# Patient Record
Sex: MALE | Race: WHITE | NOT HISPANIC OR LATINO | Employment: FULL TIME | URBAN - METROPOLITAN AREA
[De-identification: names, ages, dates, MRNs, and addresses within clinical notes are randomized per-mention and may not be internally consistent; named-entity substitution may affect disease eponyms.]

---

## 2017-02-28 ENCOUNTER — TELEPHONE (OUTPATIENT)
Dept: OTHER | Facility: CLINIC | Age: 47
End: 2017-02-28

## 2017-05-18 ENCOUNTER — OFFICE VISIT (OUTPATIENT)
Dept: FAMILY MEDICINE | Facility: CLINIC | Age: 47
End: 2017-05-18
Payer: COMMERCIAL

## 2017-05-18 VITALS
HEIGHT: 69 IN | SYSTOLIC BLOOD PRESSURE: 143 MMHG | WEIGHT: 215 LBS | BODY MASS INDEX: 31.84 KG/M2 | DIASTOLIC BLOOD PRESSURE: 95 MMHG | HEART RATE: 73 BPM | TEMPERATURE: 96.9 F

## 2017-05-18 DIAGNOSIS — N45.1 EPIDIDYMITIS, RIGHT: Primary | ICD-10-CM

## 2017-05-18 LAB
ALBUMIN UR-MCNC: NEGATIVE MG/DL
APPEARANCE UR: CLEAR
BILIRUB UR QL STRIP: NEGATIVE
COLOR UR AUTO: YELLOW
GLUCOSE UR STRIP-MCNC: NEGATIVE MG/DL
HGB UR QL STRIP: NEGATIVE
KETONES UR STRIP-MCNC: NEGATIVE MG/DL
LEUKOCYTE ESTERASE UR QL STRIP: NEGATIVE
NITRATE UR QL: NEGATIVE
PH UR STRIP: 5.5 PH (ref 5–7)
RBC #/AREA URNS AUTO: NORMAL /HPF (ref 0–2)
SP GR UR STRIP: 1.01 (ref 1–1.03)
URN SPEC COLLECT METH UR: NORMAL
UROBILINOGEN UR STRIP-ACNC: 0.2 EU/DL (ref 0.2–1)
WBC #/AREA URNS AUTO: NORMAL /HPF (ref 0–2)

## 2017-05-18 PROCEDURE — 81001 URINALYSIS AUTO W/SCOPE: CPT | Performed by: FAMILY MEDICINE

## 2017-05-18 PROCEDURE — 99213 OFFICE O/P EST LOW 20 MIN: CPT | Performed by: FAMILY MEDICINE

## 2017-05-18 RX ORDER — LEVOFLOXACIN 500 MG/1
500 TABLET, FILM COATED ORAL DAILY
Qty: 10 TABLET | Refills: 0 | Status: SHIPPED | OUTPATIENT
Start: 2017-05-18 | End: 2017-05-28

## 2017-05-18 NOTE — MR AVS SNAPSHOT
After Visit Summary   5/18/2017    Oral Blount    MRN: [id number]           Patient Information     Date Of Birth          1970        Visit Information        Provider Department      5/18/2017 9:40 AM Jonathan Koenig MD [facility]        Today's Diagnoses     Epididymitis, right    -  1      Care Instructions    You will be contacted in 1-2 days for results of your lab tests.      Discharge Instructions for Epididymitis  You have been diagnosed with epididymitis. This is an inflammation of a coiled tube called the epididymis that is located behind your testicle, inside the scrotum. The epididymis collects and stores sperm made by the testicles. Epididymitis is often caused by bacteria in the urinary tract or by bacteria passed between partners during sex. It can also be a complication of certain hospital procedures, or it can be caused by use of a urinary catheter. Here s what you need to do at home to care for yourself.  Home care    Be sure to finish all the medication your health care provider prescribed -- even if you feel better. Not finishing the medication can make your condition harder to treat or cause the condition to come back.    Rest in bed if you are uncomfortable. Discomfort should go away within 1 to 3 days of treatment. Swelling may persist for up to 2 months.    Ask your health care provider about pain medication to keep you comfortable.    Use an ice pack or bag of frozen peas to help relieve the pain. Wrap the peas or ice pack in a thin cloth and apply to the area.    Don t leave the ice pack on your skin for longer than 20 minutes, and don t use it more often than once every hour.    Elevate the scrotum with a rolled-up towel when you are resting.    For the first few days, wear an athletic supporter. When your pain subsides, wear briefs instead of boxers to better support the scrotum. This can help relieve pain.    Keep your penis and scrotum  "clean.    Use a condom to protect against sexually transmitted diseases (STDs).    If your condition was caused by an STD, be sure to inform your sexual partner(s).  Follow-up care  Make a follow-up appointment as directed by your health care provider.  When to seek medical care  Call your health care provider right away if you have any of the following:    Increased pain or swelling in the scrotum    Frequent urge or inability to urinate    Discharge from the penis    Pain during ejaculation    Fever above 100.4 F (38 C)     2163-4366 [employer]. [address] [place] [address]. All rights reserved. This information is not intended as a substitute for professional medical care. Always follow your healthcare professional's instructions.              Follow-ups after your visit        Who to contact     If you have questions or need follow up information about today's clinic visit or your schedule please contact [facility] directly at [phone].  Normal or non-critical lab and imaging results will be communicated to you by MyChart, letter or phone within 4 business days after the clinic has received the results. If you do not hear from us within 7 days, please contact the clinic through "[employer]hart or phone. If you have a critical or abnormal lab result, we will notify you by phone as soon as possible.  Submit refill requests through [employer] or call your pharmacy and they will forward the refill request to us. Please allow 3 business days for your refill to be completed.          Additional Information About Your Visit        "[employer] Information     [employer] lets you send messages to your doctor, view your test results, renew your prescriptions, schedule appointments and more. To sign up, go to [email or web address] . Click on \"Log in\" on the left side of the screen, which will take you to the Welcome page. Then click on \"Sign up Now\" on the right side of the page.     You will " "be asked to enter the access code listed below, as well as some personal information. Please follow the directions to create your username and password.     Your access code is: [email or web address]  Expires: 2017 10:21 AM     Your access code will  in 90 days. If you need help or a new code, please call your [facility] or [phone].        Care EveryWhere ID     This is your Care EveryWhere ID. This could be used by other organizations to access your [facility] medical records  [id number]        Your Vitals Were     Pulse Temperature Height BMI (Body Mass Index)          73 96.9  F (36.1  C) (Tympanic) 5' 9.25\" (1.759 m) 31.52 kg/m2         Blood Pressure from Last 3 Encounters:   17 (!) 143/95   16 (!) 128/92   16 118/79    Weight from Last 3 Encounters:   17 215 lb (97.5 kg)   04/08/15 205 lb (93 kg)   14 205 lb (93 kg)              We Performed the Following     UA with Microscopic reflex to Culture          Today's Medication Changes          These changes are accurate as of: 17 10:21 AM.  If you have any questions, ask your nurse or doctor.               Start taking these medicines.        Dose/Directions    levofloxacin 500 MG tablet   Commonly known as:  LEVAQUIN   Used for:  Epididymitis, right   Started by:  Jonathan Koenig MD        Dose:  500 mg   Take 1 tablet (500 mg) by mouth daily for 10 days   Quantity:  10 tablet   Refills:  0            Where to get your medicines      These medications were sent to [facility] - [place], [place] - [address]  [address] [facility] [address]     Phone:  [phone]     levofloxacin 500 MG tablet                Primary Care Provider Office Phone # Fax #    Elliot Osborne [phone] [address] [facility] [address]        Thank you!     Thank you for choosing [facility]  for your care. Our goal is always to provide you with excellent " care. Hearing back from our patients is one way we can continue to improve our services. Please take a few minutes to complete the written survey that you may receive in the mail after your visit with us. Thank you!             Your Updated Medication List - Protect others around you: Learn how to safely use, store and throw away your medicines at www.disposemymeds.org.          This list is accurate as of: 5/18/17 10:21 AM.  Always use your most recent med list.                   Brand Name Dispense Instructions for use    levofloxacin 500 MG tablet    LEVAQUIN    10 tablet    Take 1 tablet (500 mg) by mouth daily for 10 days

## 2017-05-18 NOTE — PROGRESS NOTES
SUBJECTIVE:                                                    Oral Blount is a 46 year old male who presents to clinic today for the following health issues:      Right testicular pain      Duration: 1 week    Description (location/character/radiation): Right testicle pain started with soreness without any particular trigger       Associated flank pain: None    Intensity:  Mild; denies limiting any activity.    Accompanying signs and symptoms:        Fever/Chills: no        Gas/Bloating: no        Nausea/vomitting: no        Diarrhea: no        Dysuria or Hematuria: no   Patient denies abdominal or scrotal lump.    History (previous similar pain/trauma/previous testing): NONE    Precipitating or alleviating factors:       Pain worse with eating/BM/urination: None       Pain relieved by BM: no   Patient states when he pushed a trailer few days ago, he noticed slight increase in pressure/soreness on the right testicular area.    Therapies tried and outcome: None    LMP:  not applicable   Patient tried not wearing underwear and he felt it relived some of the discomfort compared to wearing briefs.  Patient has been doing some minor to moderate construction and lifting around the house/yard in last few weeks.  Problem list and histories reviewed & adjusted, as indicated.  Patient reports sexual activity, monogomous and denies pain on intercourse or ejaculation.    Additional history: as documented    Patient Active Problem List   Diagnosis     Allergic rhinitis     Left great toe MTP pain     Left JOINT PAIN-SHLDER     LUQ abdominal pain     Gout     Past Surgical History:   Procedure Laterality Date     SURGICAL HISTORY OF -   2003    vasectomy       Social History   Substance Use Topics     Smoking status: Never Smoker     Smokeless tobacco: Never Used     Alcohol use No     Family History   Problem Relation Age of Onset     Eye Disorder Mother      degenerative     Hypertension Father      DIABETES Father       "CANCER Father      BCC     CANCER Paternal Grandmother      lung     CANCER Paternal Grandfather      lung     Melanoma No family hx of          Current Outpatient Prescriptions   Medication Sig Dispense Refill     levofloxacin (LEVAQUIN) 500 MG tablet Take 1 tablet (500 mg) by mouth daily for 10 days 10 tablet 0     No Known Allergies    Reviewed and updated as needed this visit by clinical staff  Tobacco  Allergies  Meds  Problems  Med Hx  Surg Hx  Fam Hx  Soc Hx        Reviewed and updated as needed this visit by Provider  Allergies  Meds  Problems         ROS:  C: NEGATIVE for fever, chills, change in weight  I: NEGATIVE for worrisome rashes, moles or lesions  R: NEGATIVE for significant cough or SOB  CV: NEGATIVE for chest pain, palpitations or peripheral edema  GI: NEGATIVE for nausea, abdominal pain, heartburn, or change in bowel habits   male :see ab[ove  M: NEGATIVE for significant arthralgias or myalgia  H: NEGATIVE for bleeding problems    OBJECTIVE:                                                    BP (!) 143/95 (BP Location: Right arm, Patient Position: Chair, Cuff Size: Adult Regular)  Pulse 73  Temp 96.9  F (36.1  C) (Tympanic)  Ht 5' 9.25\" (1.759 m)  Wt 215 lb (97.5 kg)  BMI 31.52 kg/m2  Body mass index is 31.52 kg/(m^2).  GEN: alert, oriented x 3, NAD  SKIN: no lesions/rash/ulcer/erythema  : circumcised penis; skin lesions as above; no  urethral discharge; no scrotal mass/swelling; no testicular mass either side; no inguinal LAD/mass either side; (+) mild tenderness of the right epidydimal area  Diagnostic test results:  Diagnostic Test Results:  none      ASSESSMENT/PLAN:                                                        ICD-10-CM    1. Epididymitis, right N45.1 UA with Microscopic reflex to Culture     levofloxacin (LEVAQUIN) 500 MG tablet   Discussed possible causes: infection, urethral backflow, autoimmune.  No identified high risk for STIs, so will treat for possible " enteric organism infection.  No suspicious mass that indicates imaging.   will wait for results of urine and treat accordingly when available.  Patient is advised to call clinic in 5-7 days if no resolution yet.      Follow up with Provider - prn   Patient Instructions     You will be contacted in 1-2 days for results of your lab tests.      Discharge Instructions for Epididymitis  You have been diagnosed with epididymitis. This is an inflammation of a coiled tube called the epididymis that is located behind your testicle, inside the scrotum. The epididymis collects and stores sperm made by the testicles. Epididymitis is often caused by bacteria in the urinary tract or by bacteria passed between partners during sex. It can also be a complication of certain hospital procedures, or it can be caused by use of a urinary catheter. Here s what you need to do at home to care for yourself.  Home care    Be sure to finish all the medication your health care provider prescribed -- even if you feel better. Not finishing the medication can make your condition harder to treat or cause the condition to come back.    Rest in bed if you are uncomfortable. Discomfort should go away within 1 to 3 days of treatment. Swelling may persist for up to 2 months.    Ask your health care provider about pain medication to keep you comfortable.    Use an ice pack or bag of frozen peas to help relieve the pain. Wrap the peas or ice pack in a thin cloth and apply to the area.    Don t leave the ice pack on your skin for longer than 20 minutes, and don t use it more often than once every hour.    Elevate the scrotum with a rolled-up towel when you are resting.    For the first few days, wear an athletic supporter. When your pain subsides, wear briefs instead of boxers to better support the scrotum. This can help relieve pain.    Keep your penis and scrotum clean.    Use a condom to protect against sexually transmitted diseases (STDs).    If your  condition was caused by an STD, be sure to inform your sexual partner(s).  Follow-up care  Make a follow-up appointment as directed by your health care provider.  When to seek medical care  Call your health care provider right away if you have any of the following:    Increased pain or swelling in the scrotum    Frequent urge or inability to urinate    Discharge from the penis    Pain during ejaculation    Fever above 100.4 F (38 C)     1864-7340 [employer] [address] [place] [address]. All rights reserved. This information is not intended as a substitute for professional medical care. Always follow your healthcare professional's instructions.            Jonathan Koenig MD  [facility]

## 2017-05-18 NOTE — NURSING NOTE
"Chief Complaint   Patient presents with     Abdominal Pain     Patient is here for evaluation of lower abdomen and testicle pain. This started one week ago.        Initial BP (!) 143/95 (BP Location: Right arm, Patient Position: Chair, Cuff Size: Adult Regular)  Pulse 73  Temp 96.9  F (36.1  C) (Tympanic)  Ht 5' 9.25\" (1.759 m)  Wt 215 lb (97.5 kg)  BMI 31.52 kg/m2 Estimated body mass index is 31.52 kg/(m^2) as calculated from the following:    Height as of this encounter: 5' 9.25\" (1.759 m).    Weight as of this encounter: 215 lb (97.5 kg).  Medication Reconciliation: complete   Lesvia Rai, LINO        "

## 2017-05-18 NOTE — PATIENT INSTRUCTIONS
You will be contacted in 1-2 days for results of your lab tests.      Discharge Instructions for Epididymitis  You have been diagnosed with epididymitis. This is an inflammation of a coiled tube called the epididymis that is located behind your testicle, inside the scrotum. The epididymis collects and stores sperm made by the testicles. Epididymitis is often caused by bacteria in the urinary tract or by bacteria passed between partners during sex. It can also be a complication of certain hospital procedures, or it can be caused by use of a urinary catheter. Here s what you need to do at home to care for yourself.  Home care    Be sure to finish all the medication your health care provider prescribed -- even if you feel better. Not finishing the medication can make your condition harder to treat or cause the condition to come back.    Rest in bed if you are uncomfortable. Discomfort should go away within 1 to 3 days of treatment. Swelling may persist for up to 2 months.    Ask your health care provider about pain medication to keep you comfortable.    Use an ice pack or bag of frozen peas to help relieve the pain. Wrap the peas or ice pack in a thin cloth and apply to the area.    Don t leave the ice pack on your skin for longer than 20 minutes, and don t use it more often than once every hour.    Elevate the scrotum with a rolled-up towel when you are resting.    For the first few days, wear an athletic supporter. When your pain subsides, wear briefs instead of boxers to better support the scrotum. This can help relieve pain.    Keep your penis and scrotum clean.    Use a condom to protect against sexually transmitted diseases (STDs).    If your condition was caused by an STD, be sure to inform your sexual partner(s).  Follow-up care  Make a follow-up appointment as directed by your health care provider.  When to seek medical care  Call your health care provider right away if you have any of the  following:    Increased pain or swelling in the scrotum    Frequent urge or inability to urinate    Discharge from the penis    Pain during ejaculation    Fever above 100.4 F (38 C)     6950-0154 [employer]. [address] [place] [address], [place] [address]. All rights reserved. This information is not intended as a substitute for professional medical care. Always follow your healthcare professional's instructions.

## 2017-06-28 ENCOUNTER — OFFICE VISIT (OUTPATIENT)
Dept: FAMILY MEDICINE | Facility: CLINIC | Age: 47
End: 2017-06-28
Payer: COMMERCIAL

## 2017-06-28 VITALS
SYSTOLIC BLOOD PRESSURE: 131 MMHG | TEMPERATURE: 97.1 F | HEART RATE: 73 BPM | BODY MASS INDEX: 31.49 KG/M2 | HEIGHT: 69 IN | DIASTOLIC BLOOD PRESSURE: 81 MMHG | WEIGHT: 212.6 LBS

## 2017-06-28 DIAGNOSIS — Z12.5 SCREENING FOR PROSTATE CANCER: ICD-10-CM

## 2017-06-28 DIAGNOSIS — Z00.01 ENCOUNTER FOR ROUTINE ADULT MEDICAL EXAM WITH ABNORMAL FINDINGS: Primary | ICD-10-CM

## 2017-06-28 DIAGNOSIS — Z23 NEED FOR TDAP VACCINATION: ICD-10-CM

## 2017-06-28 DIAGNOSIS — Z13.6 CARDIOVASCULAR SCREENING; LDL GOAL LESS THAN 160: ICD-10-CM

## 2017-06-28 DIAGNOSIS — Z11.4 SCREENING FOR HUMAN IMMUNODEFICIENCY VIRUS: ICD-10-CM

## 2017-06-28 DIAGNOSIS — N50.811 RIGHT TESTICULAR PAIN: ICD-10-CM

## 2017-06-28 DIAGNOSIS — Z13.1 SCREENING FOR DIABETES MELLITUS: ICD-10-CM

## 2017-06-28 DIAGNOSIS — Z23 NEED FOR VACCINATION: ICD-10-CM

## 2017-06-28 PROCEDURE — 90471 IMMUNIZATION ADMIN: CPT | Performed by: FAMILY MEDICINE

## 2017-06-28 PROCEDURE — 90715 TDAP VACCINE 7 YRS/> IM: CPT | Performed by: FAMILY MEDICINE

## 2017-06-28 PROCEDURE — 99213 OFFICE O/P EST LOW 20 MIN: CPT | Mod: 25 | Performed by: FAMILY MEDICINE

## 2017-06-28 PROCEDURE — 99396 PREV VISIT EST AGE 40-64: CPT | Mod: 25 | Performed by: FAMILY MEDICINE

## 2017-06-28 NOTE — MR AVS SNAPSHOT
After Visit Summary   6/28/2017    Oral Blount    MRN: [id number]           Patient Information     Date Of Birth          1970        Visit Information        Provider Department      6/28/2017 7:40 AM Jonathan Koenig MD [facility]        Today's Diagnoses     Encounter for routine adult medical exam with abnormal findings    -  1    Right testicular pain        CARDIOVASCULAR SCREENING; LDL GOAL LESS THAN 160        Screening for diabetes mellitus        Screening for prostate cancer        Need for Tdap vaccination        Screening for human immunodeficiency virus          Care Instructions    Call to schedule your lab tests; make sure you are fasting for more than 12 hrs.    Eat 5 cups of vegetables, fruits and whole grains per day.  Limit starchy food (white rice, white bread, white pasta, white potatoes) to less than a cup per meal.  Minimize sweets, junk food and fastfood.  Exercise: moderate intensity sustained for at least 30 mins per episode, goal of 150 mins per week at least    Schedule ultrasound to evaluate right testicular area.   Possible urology referral/consult depending on result.    Preventive Health Recommendations  Male Ages 40 to 49    Yearly exam:             See your health care provider every year in order to  o   Review health changes.   o   Discuss preventive care.    o   Review your medicines if your doctor has prescribed any.    You should be tested each year for STDs (sexually transmitted diseases) if you re at risk.     Have a cholesterol test every 5 years.     Have a colonoscopy (test for colon cancer) if someone in your family has had colon cancer or polyps before age 50.     After age 45, have a diabetes test (fasting glucose). If you are at risk for diabetes, you should have this test every 3 years.      Talk with your health care provider about whether or not a prostate cancer screening test (PSA) is right for you.    Shots: Get  a flu shot each year. Get a tetanus shot every 10 years.     Nutrition:    Eat at least 5 servings of fruits and vegetables daily.     Eat whole-grain bread, whole-wheat pasta and brown rice instead of white grains and rice.     Talk to your provider about Calcium and Vitamin D.     Lifestyle    Exercise for at least 150 minutes a week (30 minutes a day, 5 days a week). This will help you control your weight and prevent disease.     Limit alcohol to one drink per day.     No smoking.     Wear sunscreen to prevent skin cancer.     See your dentist every six months for an exam and cleaning.          Thank you for choosing [facility].  You may be receiving a survey in the mail from [employer] regarding your visit today.  Please take a few minutes to complete and return the survey to let us know how we are doing.      If you have questions or concerns, please contact us via [employer] or you can contact your care team at [phone].    Our Clinic hours are:  Monday 6:40 am  to 7:00 pm  Tuesday -Friday 6:40 am to 5:00 pm    The [place] outpatient lab hours are:  Monday - Friday 6:10 am to 4:45 pm  Saturdays 7:00 am to 11:00 am  Appointments are required, call [phone]    If you have clinical questions after hours or would like to schedule an appointment,  call the clinic at [phone].            Follow-ups after your visit        Follow-up notes from your care team     Return if symptoms worsen or fail to improve.      Future tests that were ordered for you today     Open Future Orders        Priority Expected Expires Ordered    Lipid panel reflex to direct LDL Routine 6/29/2017 12/29/2017 6/28/2017    Glucose Routine 6/29/2017 12/29/2017 6/28/2017    HIV Antigen Antibody Combo Routine 6/29/2017 12/29/2017 6/28/2017    PSA, screen Routine 6/29/2017 12/29/2017 6/28/2017    US Testicular & Scrotum w Doppler Ltd Routine  6/28/2018 6/28/2017            Who to contact     If you have questions or need follow up  "information about today's clinic visit or your schedule please contact [facility] directly at [phone].  Normal or non-critical lab and imaging results will be communicated to you by [employer], letter or phone within 4 business days after the clinic has received the results. If you do not hear from us within 7 days, please contact the clinic through [employer] or phone. If you have a critical or abnormal lab result, we will notify you by phone as soon as possible.  Submit refill requests through [employer] or call your pharmacy and they will forward the refill request to us. Please allow 3 business days for your refill to be completed.          Additional Information About Your Visit        [employer] Information     [employer] lets you send messages to your doctor, view your test results, renew your prescriptions, schedule appointments and more. To sign up, go to [email or web address] . Click on \"Log in\" on the left side of the screen, which will take you to the Welcome page. Then click on \"Sign up Now\" on the right side of the page.     You will be asked to enter the access code listed below, as well as some personal information. Please follow the directions to create your username and password.     Your access code is: [email or web address]  Expires: 2017 10:21 AM     Your access code will  in 90 days. If you need help or a new code, please call your [facility] [place] clinic or [phone].        Care EveryWhere ID     This is your Care EveryWhere ID. This could be used by other organizations to access your [facility] medical records  [id number]        Your Vitals Were     Pulse Temperature Height BMI (Body Mass Index)          76 97.1  F (36.2  C) (Tympanic) 5' 9.25\" (1.759 m) 31.17 kg/m2         Blood Pressure from Last 3 Encounters:   17 133/90   17 (!) 143/95   16 (!) 128/92    Weight from Last 3 Encounters:   17 212 lb 9.6 oz (96.4 kg)   17 215 lb (97.5 kg)   04/08/15 205 lb " (93 kg)              We Performed the Following     ADMIN 1st VACCINE     OFFICE/OUTPT VISIT,EST,LEVL III     TDAP VACCINE (ADACEL)        Primary Care Provider Office Phone # Fax #    Elliot Osborne [phone] [address] [facility] [address]        Equal Access to Services     [facility]: Hadii aad ku hadasho Soomaali, waaxda luqadaha, qaybta kaalmada adeegyada, waxay idiin hayaan adeeg kharash la'aan . So [facility] [phone].    ATENCIÓN: Si habla español, tiene a cherry disposición servicios gratuitos de asistencia lingüística. Llame al [phone].    We comply with applicable federal civil rights laws and [place] laws. We do not discriminate on the basis of race, color, national origin, age, disability sex, sexual orientation or gender identity.            Thank you!     Thank you for choosing [facility]  for your care. Our goal is always to provide you with excellent care. Hearing back from our patients is one way we can continue to improve our services. Please take a few minutes to complete the written survey that you may receive in the mail after your visit with us. Thank you!             Your Updated Medication List - Protect others around you: Learn how to safely use, store and throw away your medicines at www.disposemymeds.org.      Notice  As of 6/28/2017  8:42 AM    You have not been prescribed any medications.

## 2017-06-28 NOTE — PROGRESS NOTES
"  SUBJECTIVE:   CC: Oral Blount is an 46 year old male who presents for preventative health visit.   Chief Complaint   Patient presents with     Physical     physical, not fasting     Imm/Inj     tdap      Testicular/scrotal Pain     patient is still having low abdominal and testicle pain- right, somtimes radiates to left  , now calf pain from antibiotic        Healthy Habits:    Do you get at least three servings of calcium containing foods daily (dairy, green leafy vegetables, etc.)? yes    Amount of exercise or daily activities, outside of work: 4-5 day(s) per week    Problems taking medications regularly not applicable    Medication side effects: No    Have you had an eye exam in the past two years? no    Do you see a dentist twice per year? no    Do you have sleep apnea, excessive snoring or daytime drowsiness?no      Low Abdomen and Testicle pain, not better      Duration: 1 month     Description (location/character/radiation): pain in Right Testicle/pelvic area. Hurts more when he lays down and with certain activities. He still thinks this is a hernia. Patient now also has calf pain since being on the anti biotic he was started on at 5/18 visit. Patient states the pain would improve after a while but gets worse later in the day if he does physical work.  The increased pain is usually towards the right groin area.    Intensity:  mild    Accompanying signs and symptoms: \"feel bloated down there\" (lower part of abdomen or in pelvic area).     History (similar episodes/previous evaluation): None    Precipitating or alleviating factors: certain activites/movements irritate it , rest helps     Therapies tried and outcome: was treated with antibiotic 5/18, no relief, but it did cause calf pain        Today's PHQ-2 Score:   PHQ-2 ( 1999 [employer]) 6/28/2017 5/18/2017   Q1: Little interest or pleasure in doing things 0 0   Q2: Feeling down, depressed or hopeless 0 0   PHQ-2 Score 0 0       Abuse: Current or " Past(Physical, Sexual or Emotional)- No  Do you feel safe in your environment - Yes    Social History   Substance Use Topics     Smoking status: Never Smoker     Smokeless tobacco: Never Used     Alcohol use No     The patient does not drink >3 drinks per day nor >7 drinks per week.    Last PSA: No results found for: PSA    Reviewed orders with patient. Reviewed health maintenance and updated orders accordingly - Yes  Patient Active Problem List   Diagnosis     Allergic rhinitis     Left great toe MTP pain     Left JOINT PAIN-SHLDER     LUQ abdominal pain     Gout     Past Surgical History:   Procedure Laterality Date     SURGICAL HISTORY OF -   2003    vasectomy       Social History   Substance Use Topics     Smoking status: Never Smoker     Smokeless tobacco: Never Used     Alcohol use No     Family History   Problem Relation Age of Onset     Eye Disorder Mother      degenerative     Hypertension Father      DIABETES Father      CANCER Father      BCC     Pacemaker Father      CANCER Paternal Grandmother      lung     CANCER Paternal Grandfather      lung     Melanoma No family hx of          No current outpatient prescriptions on file.     No Known Allergies    Reviewed and updated as needed this visit by clinical staff  Tobacco  Allergies  Meds  Problems  Med Hx  Surg Hx  Fam Hx  Soc Hx          Reviewed and updated as needed this visit by Provider  Allergies  Meds  Problems        Past Medical History:   Diagnosis Date     Allergic rhinitis, cause unspecified     otc      Basal cell carcinoma      Tobacco use disorder     quit 1991-3, 2 years smoked.       Past Surgical History:   Procedure Laterality Date     SURGICAL HISTORY OF -   2003    vasectomy       ROS:  C: NEGATIVE for fever, chills, change in weight  I: NEGATIVE for worrisome rashes, moles or lesions  E: NEGATIVE for vision changes or irritation  ENT: NEGATIVE for ear, mouth and throat problems  R: NEGATIVE for significant cough or SOB  CV:  "NEGATIVE for chest pain, palpitations or peripheral edema  GI: NEGATIVE for nausea, abdominal pain, heartburn, or change in bowel habits   male: negative for dysuria, hematuria, decreased urinary stream, erectile dysfunction, urethral discharge  MUSCULOSKELETAL:see above  N: NEGATIVE for weakness, dizziness or paresthesias  E: NEGATIVE for temperature intolerance, skin/hair changes  H: NEGATIVE for bleeding problems  P: NEGATIVE for changes in mood or affect    Patient denies BM changes but c/o feeling of urinary urgency on and off for the last 1 month - usually only when he has the pain in the right testis.  No fam hx of colon cancer before age 60.  No fam hx of prostate cancer.    OBJECTIVE:   /81  Pulse 73  Temp 97.1  F (36.2  C) (Tympanic)  Ht 5' 9.25\" (1.759 m)  Wt 212 lb 9.6 oz (96.4 kg)  BMI 31.17 kg/m2  EXAM:  GENERAL APPEARANCE: obese, alert and no distress  EYES: pink conj, no icterus, PERRL, EOMI  HENT: ear canals and TM's normal, nose and mouth without ulcers or lesions, oropharynx clear and oral mucous membranes moist  NECK: no adenopathy, no asymmetry, masses, or scars and thyroid normal to palpation  RESP: lungs clear to auscultation - no rales, rhonchi or wheezes  CV: regular rates and rhythm, normal S1 S2, no S3 or S4, no murmur, click or rub, no peripheral edema and peripheral pulses strong  ABDOMEN: soft, nontender, no hepatosplenomegaly, no masses and bowel sounds normal  RECTAL: normal sphincter tone, no rectal masses, prostate slightly enlarged but smooth, soft and nontender  MS: no musculoskeletal defects are noted and gait is age appropriate without ataxia  SKIN: no suspicious lesions or rashes  NEURO: Normal strength and tone, sensory exam grossly normal, mentation intact and speech normal  : circumcised penis, no scrotal swelling; no urethral discharge; bilateral testes smooth and non-tender but right posterior pole with palpable slightly tender round smooth nodule where " epididymis is expected; no scrotal mass; no inguinal mass.  ASSESSMENT/PLAN:   Oral was seen today for physical, imm/inj and testicular/scrotal pain.    Diagnoses and all orders for this visit:    Encounter for routine adult medical exam with abnormal findings  Patient was advised on recommended screening and preventive health recommendations.  He verbalized understanding and agreed to the plans below.    Right testicular pain  -     US Testicular & Scrotum w Doppler Ltd; Future  -     OFFICE/OUTPT VISIT,EST,LEVL III  Chronic epididymitis? Mass?   Not suspicious of torsion. No clear sign of hernia.  Discussed work up and patient concurred with US.  Return precautions discussed and given to patient.  Consider urology consult.    CARDIOVASCULAR SCREENING; LDL GOAL LESS THAN 160  -     Lipid panel reflex to direct LDL; Future    Screening for diabetes mellitus  -     Glucose; Future    Screening for prostate cancer  -     PSA, screen; Future    Need for Tdap vaccination  -     TDAP VACCINE (ADACEL)  -     ADMIN 1st VACCINE    Screening for human immunodeficiency virus  -     HIV Antigen Antibody Combo; Future        COUNSELING:  Reviewed preventive health counseling, as reflected in patient instructions       Regular exercise       Healthy diet/nutrition       Vision screening       Hearing screening       Immunizations    Vaccinated for: TDAP           Family planning       Safe sex practices/STD prevention       HIV screeninx in teen years, 1x in adult years, and at intervals if high risk       Prostate cancer screening       Osteoporosis Prevention/Bone Health       The ASCVD Risk score ([place] DEE DEE Jr, et al., 2013) failed to calculate for the following reasons:    Cannot find a previous HDL lab    Cannot find a previous total cholesterol lab    BP Screening:   Last 3 BP Readings:    BP Readings from Last 3 Encounters:   17 131/81   17 (!) 143/95   16 (!) 128/92       The following was  "recommended to the patient:  Re-screen BP within a year and recommended lifestyle modifications     reports that he has never smoked. He has never used smokeless tobacco.    Estimated body mass index is 31.17 kg/(m^2) as calculated from the following:    Height as of this encounter: 5' 9.25\" (1.759 m).    Weight as of this encounter: 212 lb 9.6 oz (96.4 kg).   Weight management plan: Discussed healthy diet and exercise guidelines and patient will follow up in 12 months in clinic to re-evaluate.    Counseling Resources:  ATP IV Guidelines  Pooled Cohorts Equation Calculator  FRAX Risk Assessment  ICSI Preventive Guidelines  Dietary Guidelines for Americans, 2010  USDA's MyPlate  ASA Prophylaxis  Lung CA Screening    Jonathan Koenig MD  [facility]  "

## 2017-06-28 NOTE — PATIENT INSTRUCTIONS
Call to schedule your lab tests; make sure you are fasting for more than 12 hrs.    Eat 5 cups of vegetables, fruits and whole grains per day.  Limit starchy food (white rice, white bread, white pasta, white potatoes) to less than a cup per meal.  Minimize sweets, junk food and fastfood.  Exercise: moderate intensity sustained for at least 30 mins per episode, goal of 150 mins per week at least    Schedule ultrasound to evaluate right testicular area.   Possible urology referral/consult depending on result.    Preventive Health Recommendations  Male Ages 40 to 49    Yearly exam:             See your health care provider every year in order to  o   Review health changes.   o   Discuss preventive care.    o   Review your medicines if your doctor has prescribed any.    You should be tested each year for STDs (sexually transmitted diseases) if you re at risk.     Have a cholesterol test every 5 years.     Have a colonoscopy (test for colon cancer) if someone in your family has had colon cancer or polyps before age 50.     After age 45, have a diabetes test (fasting glucose). If you are at risk for diabetes, you should have this test every 3 years.      Talk with your health care provider about whether or not a prostate cancer screening test (PSA) is right for you.    Shots: Get a flu shot each year. Get a tetanus shot every 10 years.     Nutrition:    Eat at least 5 servings of fruits and vegetables daily.     Eat whole-grain bread, whole-wheat pasta and brown rice instead of white grains and rice.     Talk to your provider about Calcium and Vitamin D.     Lifestyle    Exercise for at least 150 minutes a week (30 minutes a day, 5 days a week). This will help you control your weight and prevent disease.     Limit alcohol to one drink per day.     No smoking.     Wear sunscreen to prevent skin cancer.     See your dentist every six months for an exam and cleaning.          Thank you for choosing [facility].  You may  be receiving a survey in the mail from [facility] regarding your visit today.  Please take a few minutes to complete and return the survey to let us know how we are doing.      If you have questions or concerns, please contact us via [employer] or you can contact your care team at [phone].    Our Clinic hours are:  Monday 6:40 am  to 7:00 pm  Tuesday -Friday 6:40 am to 5:00 pm    The [place] outpatient lab hours are:  Monday - Friday 6:10 am to 4:45 pm  Saturdays 7:00 am to 11:00 am  Appointments are required, call [phone]    If you have clinical questions after hours or would like to schedule an appointment,  call the clinic at [phone].

## 2017-06-28 NOTE — NURSING NOTE
Screening Questionnaire for Adult Immunization    Are you sick today?   No   Do you have allergies to medications, food, a vaccine component or latex?   No   Have you ever had a serious reaction after receiving a vaccination?   No   Do you have a long-term health problem with heart disease, lung disease, asthma, kidney disease, metabolic disease (e.g. diabetes), anemia, or other blood disorder?   No   Do you have cancer, leukemia, HIV/AIDS, or any other immune system problem?   No   In the past 3 months, have you taken medications that affect  your immune system, such as prednisone, other steroids, or anticancer drugs; drugs for the treatment of rheumatoid arthritis, Crohn s disease, or psoriasis; or have you had radiation treatments?   No   Have you had a seizure, or a brain or other nervous system problem?   No   During the past year, have you received a transfusion of blood or blood     products, or been given immune (gamma) globulin or antiviral drug?   No   For women: Are you pregnant or is there a chance you could become        pregnant during the next month?   No   Have you received any vaccinations in the past 4 weeks?   No     Immunization questionnaire answers were all negative.      MNVFC doesn't apply on this patient    Per orders of Dr. Koenig, injection of adacel given by Arlin Gonzales. Patient instructed to remain in clinic for 20 minutes afterwards, and to report any adverse reaction to me immediately.       Screening performed by Arlin Gonzales on 6/28/2017 at 9:01 AM.

## 2017-06-28 NOTE — NURSING NOTE
"Chief Complaint   Patient presents with     Physical     physical, not fasting     Imm/Inj     tdap      Testicular/scrotal Pain     patient is still having low abdominal and testicle pain- right, somtimes radiates to left  , now calf pain from antibiotic        Initial /90 (BP Location: Left arm, Patient Position: Chair, Cuff Size: Adult Large)  Pulse 76  Temp 97.1  F (36.2  C) (Tympanic)  Ht 5' 9.25\" (1.759 m)  Wt 212 lb 9.6 oz (96.4 kg)  BMI 31.17 kg/m2 Estimated body mass index is 31.17 kg/(m^2) as calculated from the following:    Height as of this encounter: 5' 9.25\" (1.759 m).    Weight as of this encounter: 212 lb 9.6 oz (96.4 kg).  Medication Reconciliation: complete    "

## 2018-02-13 ENCOUNTER — VIRTUAL VISIT (OUTPATIENT)
Dept: FAMILY MEDICINE | Facility: OTHER | Age: 48
End: 2018-02-13

## 2018-02-13 NOTE — PROGRESS NOTES
"Date:   Clinician: Elli Clark  Clinician NPI: [id number]  Patient: Oral Blount  Patient : 1970  Patient Address: [address] [place] [id number]  Patient Phone: [phone]  Visit Protocol: URI  Patient Summary:  Oral is a 47 year old ( : 1970 ) male who initiated a Visit for cold, sinus infection, or influenza. When asked the question \"Please sign me up to receive news, health information and promotions from [employer].\", Oral responded \"No\".    Orla states his symptoms started gradually 3-6 days ago.   His symptoms consist of tooth pain, rhinitis, malaise, a headache, nasal congestion, a cough, ear pain, and facial pain or pressure.   Symptom details     Nasal secretions: The color of his mucus is green, yellow, and clear.    Cough: Oral coughs every 5-10 minutes and his cough is more bothersome at night. Phlegm comes into his throat when he coughs. He believes the phlegm causes the cough. The color of the phlegm is green, yellow, and clear.     Facial pain or pressure: The facial pain or pressure feels worse when bending over or leaning forward.     Headache: He states the headache is moderate (between 4-6 on a 10 point pain scale).     Tooth pain: The tooth pain is not caused by a cavity, recent dental work, or other mouth problems.      Oral denies having myalgias, enlarged lymph nodes, sore throat, chills, fever, dyspnea, and wheezing. He also denies double sickening (worsening symptoms after initial improvement), having recent facial or sinus surgery in the past 60 days, taking antibiotic medication for the symptoms, and having a sinus infection within the past year.   He has not recently been exposed to someone with influenza. Oral has been in close contact with the following high risk individuals: adults 65 or older.   Weight: 200 lbs   Oral does not smoke or use smokeless tobacco.   MEDICATIONS:  Ibuprofen (Advil, Motrin), " guaifenesin (Robitussin, Mucinex), and acetaminophen (Tylenol)  , ALLERGIES:  NKDA   Clinician Response:  Dear Oral,  Based on the information provided, you have a viral upper respiratory infection, otherwise known as a cold. Symptoms vary from person to person, but can include sneezing, coughing, a runny nose, sore throat, and headache and range from mild to severe.  Unfortunately, there are no medications that can cure a cold, so treatment is focused on controlling symptoms as much as possible. Most people gradually feel better until symptoms are gone in 1-2 weeks.  Medication information  Because you have a viral infection, antibiotics will not help you get better. Treating a viral infection with antibiotics could actually make you feel worse.  Self care  The following tips will keep you as comfortable as possible while you recover:     Rest    Drink plenty of water and other liquids    Take a hot shower to loosen congestion    Take a spoonful of honey to reduce your cough     When to seek care  Please be seen in a clinic or urgent care if new symptoms develop, or symptoms become worse.   Diagnosis: Viral URI  Diagnosis ICD: J06.9  Additional Clinician Notes: I would still advise being evaluated for the other symptoms you have and were referred out for earlier

## 2018-10-31 ENCOUNTER — RADIANT APPOINTMENT (OUTPATIENT)
Dept: GENERAL RADIOLOGY | Facility: CLINIC | Age: 48
End: 2018-10-31
Attending: FAMILY MEDICINE
Payer: COMMERCIAL

## 2018-10-31 ENCOUNTER — OFFICE VISIT (OUTPATIENT)
Dept: FAMILY MEDICINE | Facility: CLINIC | Age: 48
End: 2018-10-31
Payer: COMMERCIAL

## 2018-10-31 VITALS
SYSTOLIC BLOOD PRESSURE: 142 MMHG | WEIGHT: 209.8 LBS | HEART RATE: 70 BPM | HEIGHT: 69 IN | RESPIRATION RATE: 14 BRPM | DIASTOLIC BLOOD PRESSURE: 96 MMHG | BODY MASS INDEX: 31.07 KG/M2 | OXYGEN SATURATION: 96 % | TEMPERATURE: 97 F

## 2018-10-31 DIAGNOSIS — I10 UNCONTROLLED HYPERTENSION: ICD-10-CM

## 2018-10-31 DIAGNOSIS — Z00.00 ENCOUNTER FOR ROUTINE ADULT HEALTH EXAMINATION WITHOUT ABNORMAL FINDINGS: Primary | ICD-10-CM

## 2018-10-31 DIAGNOSIS — E66.9 NON MORBID OBESITY, UNSPECIFIED OBESITY TYPE: ICD-10-CM

## 2018-10-31 LAB
ANION GAP SERPL CALCULATED.3IONS-SCNC: 4 MMOL/L (ref 3–14)
BUN SERPL-MCNC: 19 MG/DL (ref 7–30)
CALCIUM SERPL-MCNC: 8.7 MG/DL (ref 8.5–10.1)
CHLORIDE SERPL-SCNC: 106 MMOL/L (ref 94–109)
CO2 SERPL-SCNC: 30 MMOL/L (ref 20–32)
CREAT SERPL-MCNC: 1.15 MG/DL (ref 0.66–1.25)
ERYTHROCYTE [DISTWIDTH] IN BLOOD BY AUTOMATED COUNT: 12.1 % (ref 10–15)
GFR SERPL CREATININE-BSD FRML MDRD: 68 ML/MIN/1.7M2
GLUCOSE SERPL-MCNC: 80 MG/DL (ref 70–99)
HCT VFR BLD AUTO: 42.4 % (ref 40–53)
HGB BLD-MCNC: 14.9 G/DL (ref 13.3–17.7)
LDLC SERPL DIRECT ASSAY-MCNC: 91 MG/DL
MCH RBC QN AUTO: 31.1 PG (ref 26.5–33)
MCHC RBC AUTO-ENTMCNC: 35.1 G/DL (ref 31.5–36.5)
MCV RBC AUTO: 89 FL (ref 78–100)
PLATELET # BLD AUTO: 170 10E9/L (ref 150–450)
POTASSIUM SERPL-SCNC: 4 MMOL/L (ref 3.4–5.3)
RBC # BLD AUTO: 4.79 10E12/L (ref 4.4–5.9)
SODIUM SERPL-SCNC: 140 MMOL/L (ref 133–144)
TSH SERPL DL<=0.005 MIU/L-ACNC: 2.01 MU/L (ref 0.4–4)
WBC # BLD AUTO: 5.3 10E9/L (ref 4–11)

## 2018-10-31 PROCEDURE — 93000 ELECTROCARDIOGRAM COMPLETE: CPT | Performed by: FAMILY MEDICINE

## 2018-10-31 PROCEDURE — 99396 PREV VISIT EST AGE 40-64: CPT | Performed by: FAMILY MEDICINE

## 2018-10-31 PROCEDURE — 36415 COLL VENOUS BLD VENIPUNCTURE: CPT | Performed by: FAMILY MEDICINE

## 2018-10-31 PROCEDURE — 83721 ASSAY OF BLOOD LIPOPROTEIN: CPT | Performed by: FAMILY MEDICINE

## 2018-10-31 PROCEDURE — 84443 ASSAY THYROID STIM HORMONE: CPT | Performed by: FAMILY MEDICINE

## 2018-10-31 PROCEDURE — 85027 COMPLETE CBC AUTOMATED: CPT | Performed by: FAMILY MEDICINE

## 2018-10-31 PROCEDURE — 71046 X-RAY EXAM CHEST 2 VIEWS: CPT | Mod: FY

## 2018-10-31 PROCEDURE — 80048 BASIC METABOLIC PNL TOTAL CA: CPT | Performed by: FAMILY MEDICINE

## 2018-10-31 PROCEDURE — 99214 OFFICE O/P EST MOD 30 MIN: CPT | Mod: 25 | Performed by: FAMILY MEDICINE

## 2018-10-31 RX ORDER — LISINOPRIL 5 MG/1
5 TABLET ORAL DAILY
Qty: 30 TABLET | Refills: 0 | Status: SHIPPED | OUTPATIENT
Start: 2018-10-31 | End: 2018-11-30

## 2018-10-31 NOTE — PATIENT INSTRUCTIONS
Your blood pressure today is uncontrolled.  Due to this medical treatment should be started to prevent complications.  Start lisinopril 5 mg daily.  Blood,  chest xray and EKG tests are ordered as baseline.  Low salt, low fat diet.   Be consistent with low trans fat and saturated fat diet.  Eat food rich in omega-3-fatty acids as you tolerate. (salmon, olive oil)  Eat 5 cups of vegetables, fruits and whole grains per day.  Limit starchy food (white rice, white bread, white pasta, white potatoes) to less than a cup per meal.  Minimize sweets, junk food and fastfood. Limit soda beverages to one serving per day; best to avoid it altogether though.  Exercise: moderate intensity sustained for at least 30 mins per episode, goal of 150 mins per week at least  Combine cardiovascular and resistance exercises.  These exercise recommendations are in addition to your daily activity at work or home.  Work on losing weight.   Take meds as prescribed; call if with side effects.     Schedule nurse visit in 1 month for recheck of your blood pressure.    You deferred getting a flu shot today. 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    Preventive Health Recommendations  Male Ages 40 to 49    Yearly exam:             See your health care provider every year in order to  o   Review health changes.   o   Discuss preventive care.    o   Review your medicines if your doctor has prescribed any.    You should be tested each year for STDs (sexually transmitted diseases) if you re at risk.     Have a cholesterol test every 5 years.     Have a colonoscopy (test for colon cancer) if someone in your family has had colon cancer or polyps before age 50.     After age 45, have a diabetes test (fasting glucose). If you are at risk for diabetes, you should have this test every 3 years.      Talk with your health care provider about whether or  not a prostate cancer screening test (PSA) is right for you.    Shots: Get a flu shot each year. Get a tetanus shot every 10 years.     Nutrition:    Eat at least 5 servings of fruits and vegetables daily.     Eat whole-grain bread, whole-wheat pasta and brown rice instead of white grains and rice.     Get adequate Calcium and Vitamin D.     Lifestyle    Exercise for at least 150 minutes a week (30 minutes a day, 5 days a week). This will help you control your weight and prevent disease.     Limit alcohol to one drink per day.     No smoking.     Wear sunscreen to prevent skin cancer.     See your dentist every six months for an exam and cleaning.      Lisinopril tablets  Brand Names: Prinivil, Zestril  What is this medicine?  LISINOPRIL (lyse IN oh pril) is an ACE inhibitor. This medicine is used to treat high blood pressure and heart failure. It is also used to protect the heart immediately after a heart attack.  How should I use this medicine?  Take this medicine by mouth with a glass of water. Follow the directions on your prescription label. You may take this medicine with or without food. If it upsets your stomach, take it with food. Take your medicine at regular intervals. Do not take it more often than directed. Do not stop taking except on your doctor's advice.  Talk to your pediatrician regarding the use of this medicine in children. Special care may be needed. While this drug may be prescribed for children as young as 6 years of age for selected conditions, precautions do apply.  What side effects may I notice from receiving this medicine?  Side effects that you should report to your doctor or health care professional as soon as possible:    allergic reactions like skin rash, itching or hives, swelling of the hands, feet, face, lips, throat, or tongue    breathing problems    signs and symptoms of kidney injury like trouble passing urine or change in the amount of urine    signs and symptoms of increased  potassium like muscle weakness; chest pain; or fast, irregular heartbeat    signs and symptoms of liver injury like dark yellow or brown urine; general ill feeling or flu-like symptoms; light-colored stools; loss of appetite; nausea; right upper belly pain; unusually weak or tired; yellowing of the eyes or skin    signs and symptoms of low blood pressure like dizziness; feeling faint or lightheaded, falls; unusually weak or tired    stomach pain with or without nausea and vomiting  Side effects that usually do not require medical attention (report to your doctor or health care professional if they continue or are bothersome):    changes in taste    cough    dizziness    fever    headache    sensitivity to light  What may interact with this medicine?  Do not take this medicine with any of the following medications:    hymenoptera venom    sacubitril; valsartan  This medicines may also interact with the following medications:    aliskiren    angiotensin receptor blockers, like losartan or valsartan    certain medicines for diabetes    diuretics    everolimus    gold compounds    lithium    NSAIDs, medicines for pain and inflammation, like ibuprofen or naproxen    potassium salts or supplements    salt substitutes    sirolimus    temsirolimus  What if I miss a dose?  If you miss a dose, take it as soon as you can. If it is almost time for your next dose, take only that dose. Do not take double or extra doses.  Where should I keep my medicine?  Keep out of the reach of children.  Store at room temperature between 15 and 30 degrees C (59 and 86 degrees F). Protect from moisture. Keep container tightly closed. Throw away any unused medicine after the expiration date.  What should I tell my health care provider before I take this medicine?  They need to know if you have any of these conditions:    diabetes    heart or blood vessel disease    kidney disease    low blood pressure    previous swelling of the tongue, face, or  lips with difficulty breathing, difficulty swallowing, hoarseness, or tightening of the throat    an unusual or allergic reaction to lisinopril, other ACE inhibitors, insect venom, foods, dyes, or preservatives    pregnant or trying to get pregnant    breast-feeding  What should I watch for while using this medicine?  Visit your doctor or health care professional for regular check ups. Check your blood pressure as directed. Ask your doctor what your blood pressure should be, and when you should contact him or her.  Do not treat yourself for coughs, colds, or pain while you are using this medicine without asking your doctor or health care professional for advice. Some ingredients may increase your blood pressure.  Women should inform their doctor if they wish to become pregnant or think they might be pregnant. There is a potential for serious side effects to an unborn child. Talk to your health care professional or pharmacist for more information.  Check with your doctor or health care professional if you get an attack of severe diarrhea, nausea and vomiting, or if you sweat a lot. The loss of too much body fluid can make it dangerous for you to take this medicine.  You may get drowsy or dizzy. Do not drive, use machinery, or do anything that needs mental alertness until you know how this drug affects you. Do not stand or sit up quickly, especially if you are an older patient. This reduces the risk of dizzy or fainting spells. Alcohol can make you more drowsy and dizzy. Avoid alcoholic drinks.  Avoid salt substitutes unless you are told otherwise by your doctor or health care professional.  NOTE:This sheet is a summary. It may not cover all possible information. If you have questions about this medicine, talk to your doctor, pharmacist, or health care provider. Copyright  2018 [employer]

## 2018-10-31 NOTE — MR AVS SNAPSHOT
After Visit Summary   10/31/2018    Oral Blount    MRN: [id number]           Patient Information     Date Of Birth          1970        Visit Information        Provider Department      10/31/2018 8:20 AM Jonathan Koenig MD [facility]        Today's Diagnoses     Encounter for routine adult health examination without abnormal findings    -  1    Uncontrolled hypertension        Non morbid obesity, unspecified obesity type          Care Instructions    Your blood pressure today is uncontrolled.  Due to this medical treatment should be started to prevent complications.  Start lisinopril 5 mg daily.  Blood,  chest xray and EKG tests are ordered as baseline.  Low salt, low fat diet.   Be consistent with low trans fat and saturated fat diet.  Eat food rich in omega-3-fatty acids as you tolerate. (salmon, olive oil)  Eat 5 cups of vegetables, fruits and whole grains per day.  Limit starchy food (white rice, white bread, white pasta, white potatoes) to less than a cup per meal.  Minimize sweets, junk food and fastfood. Limit soda beverages to one serving per day; best to avoid it altogether though.  Exercise: moderate intensity sustained for at least 30 mins per episode, goal of 150 mins per week at least  Combine cardiovascular and resistance exercises.  These exercise recommendations are in addition to your daily activity at work or home.  Work on losing weight.   Take meds as prescribed; call if with side effects.     Schedule nurse visit in 1 month for recheck of your blood pressure.    You deferred getting a flu shot today. 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    Preventive Health Recommendations  Male Ages 40 to 49    Yearly exam:             See your health care provider every year in order to  o   Review health changes.   o   Discuss preventive care.    o    Review your medicines if your doctor has prescribed any.    You should be tested each year for STDs (sexually transmitted diseases) if you re at risk.     Have a cholesterol test every 5 years.     Have a colonoscopy (test for colon cancer) if someone in your family has had colon cancer or polyps before age 50.     After age 45, have a diabetes test (fasting glucose). If you are at risk for diabetes, you should have this test every 3 years.      Talk with your health care provider about whether or not a prostate cancer screening test (PSA) is right for you.    Shots: Get a flu shot each year. Get a tetanus shot every 10 years.     Nutrition:    Eat at least 5 servings of fruits and vegetables daily.     Eat whole-grain bread, whole-wheat pasta and brown rice instead of white grains and rice.     Get adequate Calcium and Vitamin D.     Lifestyle    Exercise for at least 150 minutes a week (30 minutes a day, 5 days a week). This will help you control your weight and prevent disease.     Limit alcohol to one drink per day.     No smoking.     Wear sunscreen to prevent skin cancer.     See your dentist every six months for an exam and cleaning.      Lisinopril tablets  Brand Names: Prinivil, Zestril  What is this medicine?  LISINOPRIL (lyse IN oh pril) is an ACE inhibitor. This medicine is used to treat high blood pressure and heart failure. It is also used to protect the heart immediately after a heart attack.  How should I use this medicine?  Take this medicine by mouth with a glass of water. Follow the directions on your prescription label. You may take this medicine with or without food. If it upsets your stomach, take it with food. Take your medicine at regular intervals. Do not take it more often than directed. Do not stop taking except on your doctor's advice.  Talk to your pediatrician regarding the use of this medicine in children. Special care may be needed. While this drug may be prescribed for children as  young as 6 years of age for selected conditions, precautions do apply.  What side effects may I notice from receiving this medicine?  Side effects that you should report to your doctor or health care professional as soon as possible:    allergic reactions like skin rash, itching or hives, swelling of the hands, feet, face, lips, throat, or tongue    breathing problems    signs and symptoms of kidney injury like trouble passing urine or change in the amount of urine    signs and symptoms of increased potassium like muscle weakness; chest pain; or fast, irregular heartbeat    signs and symptoms of liver injury like dark yellow or brown urine; general ill feeling or flu-like symptoms; light-colored stools; loss of appetite; nausea; right upper belly pain; unusually weak or tired; yellowing of the eyes or skin    signs and symptoms of low blood pressure like dizziness; feeling faint or lightheaded, falls; unusually weak or tired    stomach pain with or without nausea and vomiting  Side effects that usually do not require medical attention (report to your doctor or health care professional if they continue or are bothersome):    changes in taste    cough    dizziness    fever    headache    sensitivity to light  What may interact with this medicine?  Do not take this medicine with any of the following medications:    hymenoptera venom    sacubitril; valsartan  This medicines may also interact with the following medications:    aliskiren    angiotensin receptor blockers, like losartan or valsartan    certain medicines for diabetes    diuretics    everolimus    gold compounds    lithium    NSAIDs, medicines for pain and inflammation, like ibuprofen or naproxen    potassium salts or supplements    salt substitutes    sirolimus    temsirolimus  What if I miss a dose?  If you miss a dose, take it as soon as you can. If it is almost time for your next dose, take only that dose. Do not take double or extra doses.  Where should  I keep my medicine?  Keep out of the reach of children.  Store at room temperature between 15 and 30 degrees C (59 and 86 degrees F). Protect from moisture. Keep container tightly closed. Throw away any unused medicine after the expiration date.  What should I tell my health care provider before I take this medicine?  They need to know if you have any of these conditions:    diabetes    heart or blood vessel disease    kidney disease    low blood pressure    previous swelling of the tongue, face, or lips with difficulty breathing, difficulty swallowing, hoarseness, or tightening of the throat    an unusual or allergic reaction to lisinopril, other ACE inhibitors, insect venom, foods, dyes, or preservatives    pregnant or trying to get pregnant    breast-feeding  What should I watch for while using this medicine?  Visit your doctor or health care professional for regular check ups. Check your blood pressure as directed. Ask your doctor what your blood pressure should be, and when you should contact him or her.  Do not treat yourself for coughs, colds, or pain while you are using this medicine without asking your doctor or health care professional for advice. Some ingredients may increase your blood pressure.  Women should inform their doctor if they wish to become pregnant or think they might be pregnant. There is a potential for serious side effects to an unborn child. Talk to your health care professional or pharmacist for more information.  Check with your doctor or health care professional if you get an attack of severe diarrhea, nausea and vomiting, or if you sweat a lot. The loss of too much body fluid can make it dangerous for you to take this medicine.  You may get drowsy or dizzy. Do not drive, use machinery, or do anything that needs mental alertness until you know how this drug affects you. Do not stand or sit up quickly, especially if you are an older patient. This reduces the risk of dizzy or fainting  "spells. Alcohol can make you more drowsy and dizzy. Avoid alcoholic drinks.  Avoid salt substitutes unless you are told otherwise by your doctor or health care professional.  NOTE:This sheet is a summary. It may not cover all possible information. If you have questions about this medicine, talk to your doctor, pharmacist, or health care provider. Copyright  2018 [employer]                Follow-ups after your visit        Follow-up notes from your care team     Return in about 1 month (around 11/30/2018), or if symptoms worsen or fail to improve.      Your next 10 appointments already scheduled     Nov 30, 2018  8:30 AM CST   [facility] LAI SMITH [facility]JOSE RN   [facility]    [address] [facility] [address] [phone]              Who to contact     If you have questions or need follow up information about today's clinic visit or your schedule please contact [facility] directly at [phone].  Normal or non-critical lab and imaging results will be communicated to you by MyChart, letter or phone within 4 business days after the clinic has received the results. If you do not hear from us within 7 days, please contact the clinic through MyChart or phone. If you have a critical or abnormal lab result, we will notify you by phone as soon as possible.  Submit refill requests through [employer] or call your pharmacy and they will forward the refill request to us. Please allow 3 business days for your refill to be completed.          Additional Information About Your Visit        Care EveryWhere ID     This is your Care EveryWhere ID. This could be used by other organizations to access your [place] medical records  [id number]        Your Vitals Were     Pulse Temperature Respirations Height Pulse Oximetry BMI (Body Mass Index)    70 97  F (36.1  C) (Tympanic) 14 5' 9.25\" (1.759 m) 96% 30.76 kg/m2       Blood Pressure from Last 3 Encounters:   10/31/18 (!) " 142/96   06/28/17 131/81   05/18/17 (!) 143/95    Weight from Last 3 Encounters:   10/31/18 209 lb 12.8 oz (95.2 kg)   06/28/17 212 lb 9.6 oz (96.4 kg)   05/18/17 215 lb (97.5 kg)              We Performed the Following     Basic metabolic panel     CBC with platelets     EKG 12-lead complete w/read - Clinics     LDL cholesterol direct     TSH with free T4 reflex     XR Chest 2 Views          Today's Medication Changes          These changes are accurate as of 10/31/18  9:18 AM.  If you have any questions, ask your nurse or doctor.               Start taking these medicines.        Dose/Directions    lisinopril 5 MG tablet   Commonly known as:  PRINIVIL/ZESTRIL   Used for:  Uncontrolled hypertension   Started by:  Jonathan Koenig MD        Dose:  5 mg   Take 1 tablet (5 mg) by mouth daily   Quantity:  30 tablet   Refills:  0            Where to get your medicines      These medications were sent to [facility] [address] [place] [address] [address]  [address] [facility] [address]     Phone:  [phone]     lisinopril 5 MG tablet                Primary Care Provider Office Phone # Fax #    Elliot Osborne [phone] [address] [facility] [address]        Equal Access to Services     MARCUS WING : Hadii augustine ku hadasho Soomaali, waaxda luqadaha, qaybta kaalmada adeegyada, paolo garcia. So [facility] [phone].    ATENCIÓN: Si habla español, tiene a cherry disposición servicios gratuitos de asistencia lingüística. Luis al [phone].    We comply with applicable federal civil rights laws and [place] laws. We do not discriminate on the basis of race, color, national origin, age, disability, sex, sexual orientation, or gender identity.            Thank you!     Thank you for choosing [facility]  for your care. Our goal is always to provide you with excellent care. Hearing back from our patients is one way we can  continue to improve our services. Please take a few minutes to complete the written survey that you may receive in the mail after your visit with us. Thank you!             Your Updated Medication List - Protect others around you: Learn how to safely use, store and throw away your medicines at www.disposemymeds.org.          This list is accurate as of 10/31/18  9:18 AM.  Always use your most recent med list.                   Brand Name Dispense Instructions for use Diagnosis    lisinopril 5 MG tablet    PRINIVIL/ZESTRIL    30 tablet    Take 1 tablet (5 mg) by mouth daily    Uncontrolled hypertension

## 2018-10-31 NOTE — PROGRESS NOTES
SUBJECTIVE:   CC: Oral Blount is an 47 year old male who presents for preventative health visit.     Healthy Habits:    Do you get at least three servings of calcium containing foods daily (dairy, green leafy vegetables, etc.)? yes    Amount of exercise or daily activities, outside of work: 4-5 day(s) per week    Problems taking medications regularly not applicable    Medication side effects: No    Have you had an eye exam in the past two years? no    Do you see a dentist twice per year? no    Do you have sleep apnea, excessive snoring or daytime drowsiness? Yes-snoring       High Blood Pressure      Outpatient blood pressures are being checked at home and fire station.  Results are often >140/90.    Low Salt Diet: not monitoring salt    Patient's last BP in chart was 130's/80's.    Denies chest pain, dyspnea, HA, BOV, dizziness or urinary changes.    Since knowing he has high BP - started exercising. Nothing else.      Today's PHQ-2 Score:   PHQ-2 ( 1999 Pfizer) 10/31/2018 6/28/2017   Q1: Little interest or pleasure in doing things 0 0   Q2: Feeling down, depressed or hopeless 0 0   PHQ-2 Score 0 0       Abuse: Current or Past(Physical, Sexual or Emotional)- No  Do you feel safe in your environment - Yes    Social History   Substance Use Topics     Smoking status: Never Smoker     Smokeless tobacco: Never Used     Alcohol use No      If you drink alcohol do you typically have >3 drinks per day or >7 drinks per week? No                      Last PSA: No results found for: PSA    Reviewed orders with patient. Reviewed health maintenance and updated orders accordingly - Yes  BP Readings from Last 3 Encounters:   10/31/18 (!) 142/96   06/28/17 131/81   05/18/17 (!) 143/95    Wt Readings from Last 3 Encounters:   10/31/18 209 lb 12.8 oz (95.2 kg)   06/28/17 212 lb 9.6 oz (96.4 kg)   05/18/17 215 lb (97.5 kg)                  Patient Active Problem List   Diagnosis     Allergic rhinitis     Left great toe MTP pain      Left JOINT PAIN-SHLDER     LUQ abdominal pain     Gout     Uncontrolled hypertension     Past Surgical History:   Procedure Laterality Date     SURGICAL HISTORY OF -   2003    vasectomy       Social History   Substance Use Topics     Smoking status: Never Smoker     Smokeless tobacco: Never Used     Alcohol use No     Family History   Problem Relation Age of Onset     Eye Disorder Mother      degenerative     Hypertension Father      Diabetes Father      Cancer Father      BCC     Pacemaker Father      Cancer Paternal Grandmother      lung     Cancer Paternal Grandfather      lung     Melanoma No family hx of          Current Outpatient Prescriptions   Medication Sig Dispense Refill     lisinopril (PRINIVIL/ZESTRIL) 5 MG tablet Take 1 tablet (5 mg) by mouth daily 30 tablet 0     No Known Allergies    Reviewed and updated as needed this visit by clinical staff  Tobacco  Allergies  Meds  Problems  Med Hx  Surg Hx  Fam Hx  Soc Hx          Reviewed and updated as needed this visit by Provider  Allergies  Meds  Problems        Past Medical History:   Diagnosis Date     Allergic rhinitis, cause unspecified     otc      Basal cell carcinoma      Tobacco use disorder     quit 1991-3, 2 years smoked.       Past Surgical History:   Procedure Laterality Date     SURGICAL HISTORY OF -   2003    vasectomy       ROS:  CONSTITUTIONAL: NEGATIVE for fever, chills, change in weight  INTEGUMENTARY/SKIN: NEGATIVE for worrisome rashes, moles or lesions  EYES: NEGATIVE for vision changes or irritation  ENT: NEGATIVE for ear, mouth and throat problems  RESP: NEGATIVE for significant cough or SOB  CV: NEGATIVE for chest pain, palpitations or peripheral edema  GI: NEGATIVE for nausea, abdominal pain, heartburn, or change in bowel habits   male: negative for dysuria, hematuria, decreased urinary stream, erectile dysfunction, urethral discharge  MUSCULOSKELETAL: NEGATIVE for significant arthralgias or myalgia  NEURO: NEGATIVE  "for weakness, dizziness or paresthesias  ENDOCRINE: NEGATIVE for temperature intolerance, skin/hair changes  HEME/ALLERGY/IMMUNE: NEGATIVE for bleeding problems  PSYCHIATRIC: NEGATIVE for changes in mood or affect    OBJECTIVE:   BP (!) 142/96  Pulse 70  Temp 97  F (36.1  C) (Tympanic)  Resp 14  Ht 5' 9.25\" (1.759 m)  Wt 209 lb 12.8 oz (95.2 kg)  SpO2 96%  BMI 30.76 kg/m2  EXAM:  GENERAL APPEARANCE: obese,  alert and no distress  EYES: pink conj, no icterus, PERRL, EOMI  HENT: ear canals and TM's normal, nose and mouth without ulcers or lesions, oropharynx clear and oral mucous membranes moist  NECK: no adenopathy, no asymmetry, masses, or scars and thyroid normal to palpation  RESP: lungs clear to auscultation - no rales, rhonchi or wheezes  CV: regular rates and rhythm, normal S1 S2, no S3 or S4, no murmur, click or rub, no peripheral edema and peripheral pulses strong  ABDOMEN: soft, nontender, no hepatosplenomegaly, no masses and bowel sounds normal  RECTAL: deferred  MS: no musculoskeletal defects are noted and gait is age appropriate without ataxia  SKIN: no suspicious lesions or rashes  NEURO: Normal strength and tone, sensory exam grossly normal, mentation intact and speech normal    Diagnostic Test Results:  Results for orders placed or performed in visit on 10/31/18   XR Chest 2 Views    Narrative    XR CHEST 2 VW 10/31/2018 9:33 AM    HISTORY: Hypertension.    COMPARISON: None.      Impression    IMPRESSION: 2 views of the chest show no acute or active  cardiopulmonary disease. Specifically, heart size is normal.     YOAN GE MD   CBC with platelets   Result Value Ref Range    WBC 5.3 4.0 - 11.0 10e9/L    RBC Count 4.79 4.4 - 5.9 10e12/L    Hemoglobin 14.9 13.3 - 17.7 g/dL    Hematocrit 42.4 40.0 - 53.0 %    MCV 89 78 - 100 fl    MCH 31.1 26.5 - 33.0 pg    MCHC 35.1 31.5 - 36.5 g/dL    RDW 12.1 10.0 - 15.0 %    Platelet Count 170 150 - 450 10e9/L       ASSESSMENT/PLAN:   Oral was seen today " for physical.    Diagnoses and all orders for this visit:    Encounter for routine adult health examination without abnormal findings  Patient was advised on recommended screening and preventive health recommendations.  He verbalized understanding and agreed to the plans below.\    Uncontrolled hypertension  -     LDL cholesterol direct  -     Basic metabolic panel  -     CBC with platelets  -     EKG 12-lead complete w/read - Clinics  -     TSH with free T4 reflex  -     XR Chest 2 Views  -     lisinopril (PRINIVIL/ZESTRIL) 5 MG tablet; Take 1 tablet (5 mg) by mouth daily  Discussed causes, course, complications and treatment of condition.  Low salt, low fat diet.   Exercise recommendations discussed with patient in detail.  Counseled on different meds; patient agreed to above recommended med.  Take meds as prescribed; call if with side effects.  Baseline tests ordered and done today. Patient was advised of EKG tracing findings today. No acute ischemic changes.   Nurse visit in 1 month for recheck.  Return precautions given.    Non morbid obesity, unspecified obesity type  Discussed risks of obesity: heart disease, stroke, end-organ damage, worsening HTN, new DM, decreased quality of life  Counselled on diet, exercise and weight loss regimen.  Healthy food choices reviewed with patient.      In addition to 15 mins of preventive health visit, spent another 25 minutes to address uncontrolled hypertension as above.        COUNSELING:  Reviewed preventive health counseling, as reflected in patient instructions       Regular exercise       Healthy diet/nutrition       Vision screening       Hearing screening       Immunizations    Declined: Influenza due to Conscientious objector               Alcohol Use       Family planning       Safe sex practices/STD prevention       Colon cancer screening       Prostate cancer screening       Osteoporosis Prevention/Bone Health       The ASCVD Risk score (Ogdenhiral FUNEZ Jr, et al., 2013)  "failed to calculate for the following reasons:    Cannot find a previous HDL lab    Cannot find a previous total cholesterol lab    BP Readings from Last 1 Encounters:   10/31/18 (!) 142/96     Estimated body mass index is 30.76 kg/(m^2) as calculated from the following:    Height as of this encounter: 5' 9.25\" (1.759 m).    Weight as of this encounter: 209 lb 12.8 oz (95.2 kg).      Weight management plan: Discussed healthy diet and exercise guidelines and patient will follow up in 12 months in clinic to re-evaluate.     reports that he has never smoked. He has never used smokeless tobacco.      Counseling Resources:  ATP IV Guidelines  Pooled Cohorts Equation Calculator  FRAX Risk Assessment  ICSI Preventive Guidelines  Dietary Guidelines for Americans, 2010  USDA's MyPlate  ASA Prophylaxis  Lung CA Screening    Jonathan Koenig MD  [facility]  "

## 2018-11-05 PROBLEM — E66.9 NON MORBID OBESITY, UNSPECIFIED OBESITY TYPE: Status: ACTIVE | Noted: 2018-11-05

## 2018-11-30 ENCOUNTER — OFFICE VISIT (OUTPATIENT)
Dept: FAMILY MEDICINE | Facility: CLINIC | Age: 48
End: 2018-11-30
Payer: COMMERCIAL

## 2018-11-30 ENCOUNTER — TELEPHONE (OUTPATIENT)
Dept: FAMILY MEDICINE | Facility: CLINIC | Age: 48
End: 2018-11-30

## 2018-11-30 VITALS — HEART RATE: 73 BPM | DIASTOLIC BLOOD PRESSURE: 86 MMHG | SYSTOLIC BLOOD PRESSURE: 122 MMHG

## 2018-11-30 DIAGNOSIS — I10 BENIGN ESSENTIAL HYPERTENSION: ICD-10-CM

## 2018-11-30 DIAGNOSIS — B00.1 RECURRENT COLD SORES: Primary | ICD-10-CM

## 2018-11-30 DIAGNOSIS — Z01.30 BP CHECK: Primary | ICD-10-CM

## 2018-11-30 PROCEDURE — 99207 ZZC NO CHARGE NURSE ONLY: CPT

## 2018-11-30 RX ORDER — LISINOPRIL 5 MG/1
5 TABLET ORAL DAILY
Qty: 90 TABLET | Refills: 1 | Status: SHIPPED | OUTPATIENT
Start: 2018-11-30 | End: 2019-04-25 | Stop reason: SINTOL

## 2018-11-30 NOTE — TELEPHONE ENCOUNTER
Dr. Koenig,  Patient notified and understands plan.    Would like something for cold sore. States that this was discussed while at last visit but no medications were sent in. I could not find notes on this discussion.    Thanks,  Marilin DU RN

## 2018-11-30 NOTE — TELEPHONE ENCOUNTER
S-(situation): BP check    B-(background): LOV 10/31/18 Dr. Koenig-started lisinopril 5 mg     A-(assessment):   Vital Signs 11/30/2018 11/30/2018   Systolic 128 122   Diastolic 88 86   Pulse 73 73   Manual    Saud BP readings: 110/70 135/85    Will need refill. - [facility] [place]    Would like something for cold sore. States that this was discussed while at last visit but no medications were sent in. I could not find notes on this discussion.    R-(recommendations): Routing to provider.      Lena BLANCO RN

## 2018-11-30 NOTE — TELEPHONE ENCOUNTER
Are the sores still present since 1 month ago? The visit was 10/31/2018. Cold sores commonly resolves after only several days.

## 2018-11-30 NOTE — MR AVS SNAPSHOT
After Visit Summary   11/30/2018    Oral Blount    MRN: [id number]           Patient Information     Date Of Birth          1970        Visit Information        Provider Department      11/30/2018 8:30 AM LAI CONSTANTINO/JOSE LOPEZ [facility]        Today's Diagnoses     BP check    -  1       Follow-ups after your visit        Who to contact     If you have questions or need follow up information about today's clinic visit or your schedule please contact [facility] directly at [phone].  Normal or non-critical lab and imaging results will be communicated to you by MyChart, letter or phone within 4 business days after the clinic has received the results. If you do not hear from us within 7 days, please contact the clinic through MyChart or phone. If you have a critical or abnormal lab result, we will notify you by phone as soon as possible.  Submit refill requests through [email or web address] or call your pharmacy and they will forward the refill request to us. Please allow 3 business days for your refill to be completed.          Additional Information About Your Visit        Care EveryWhere ID     This is your Care EveryWhere ID. This could be used by other organizations to access your [facility] medical records  [id number]        Your Vitals Were     Pulse                   73            Blood Pressure from Last 3 Encounters:   11/30/18 122/86   10/31/18 (!) 142/96   06/28/17 131/81    Weight from Last 3 Encounters:   10/31/18 209 lb 12.8 oz (95.2 kg)   06/28/17 212 lb 9.6 oz (96.4 kg)   05/18/17 215 lb (97.5 kg)              Today, you had the following     No orders found for display       Primary Care Provider Office Phone # Fax #    Elliot Osborne [phone] [address] [facility] [id number]        Equal Access to Services     MARCUS WING AH: Ling Cerrato, jewell dubose, paolo garcia  maria dolores hastings ah. So [facility] [phone].    ATENCIÓN: Si habla silvestre, tiene a cherry disposición servicios gratuitos de asistencia lingüística. Luis al [phone].    We comply with applicable federal civil rights laws and [place] laws. We do not discriminate on the basis of race, color, national origin, age, disability, sex, sexual orientation, or gender identity.            Thank you!     Thank you for choosing [facility]  for your care. Our goal is always to provide you with excellent care. Hearing back from our patients is one way we can continue to improve our services. Please take a few minutes to complete the written survey that you may receive in the mail after your visit with us. Thank you!             Your Updated Medication List - Protect others around you: Learn how to safely use, store and throw away your medicines at www.disposemymeds.org.          This list is accurate as of 11/30/18  8:48 AM.  Always use your most recent med list.                   Brand Name Dispense Instructions for use Diagnosis    lisinopril 5 MG tablet    PRINIVIL/ZESTRIL    30 tablet    Take 1 tablet (5 mg) by mouth daily    Uncontrolled hypertension

## 2018-11-30 NOTE — PROGRESS NOTES
S-(situation): BP check    B-(background): LOV 10/31/18 Dr. Koenig-started lisinopril 5 mg     A-(assessment):   Vital Signs 11/30/2018 11/30/2018   Systolic 128 122   Diastolic 88 86   Pulse 73 73   Manual    Saud BP readings: Low 110/70 High 135/85  No complaints or side effects reported by patient at time of visit.    Will need refill. - [facility]    Would like something for cold sore. States that this was discussed while at last visit but no medications were sent in.     R-(recommendations): Routing to provider.      Lena BLANCO RN

## 2018-11-30 NOTE — TELEPHONE ENCOUNTER
Blood pressure much better.  Continue lisinopril 5 mg daily.  Low salt, low fat diet.   Exercise: moderate intensity sustained for at least 30 mins per episode, goal of 150 mins per week at least  Schedule nurse visit in 3 months for a blood pressure recheck.

## 2018-12-03 RX ORDER — VALACYCLOVIR HYDROCHLORIDE 500 MG/1
500 TABLET, FILM COATED ORAL 2 TIMES DAILY
Qty: 6 TABLET | Refills: 3 | Status: SHIPPED | OUTPATIENT
Start: 2018-12-03 | End: 2021-04-19

## 2018-12-03 NOTE — TELEPHONE ENCOUNTER
Message below relayed to pt and he expressed understanding and agreement.     Jemima MARTINO RN

## 2018-12-03 NOTE — TELEPHONE ENCOUNTER
"Dr. Koenig,   Called and spoke with pt. \"I didn't have them when I seen him last time.\"  \"Every time I get a cold, I get a cold sore.\" His cold is getting better but he still has cold sores. He says he may have been prescribed Rx for cold sores about 15 years ago.   Med hx checked and nothing has been prescribed through a [facility] provider.  Says he has used Abreva and Lysine when he \"feels it coming on.\"    Advise,     Jemima MARTINO RN      "

## 2018-12-03 NOTE — TELEPHONE ENCOUNTER
Valacyclovir Rx has been sent to pharmacy.  Since his cold sores have been rpesent for several days now, this medication has no significant benefit if taken, so starting it now is not recommended. The cold sores will resolve on their own.    If he does feel a flare or breakout of the cold sore in the future, he may start the medication within 48 hours of feeling them starting.

## 2018-12-14 ENCOUNTER — TELEPHONE (OUTPATIENT)
Dept: FAMILY MEDICINE | Facility: CLINIC | Age: 48
End: 2018-12-14

## 2018-12-14 DIAGNOSIS — I10 UNCONTROLLED HYPERTENSION: Primary | ICD-10-CM

## 2018-12-14 RX ORDER — AMLODIPINE BESYLATE 2.5 MG/1
2.5 TABLET ORAL DAILY
Qty: 90 TABLET | Refills: 0 | Status: SHIPPED | OUTPATIENT
Start: 2018-12-14 | End: 2019-04-25

## 2018-12-14 NOTE — TELEPHONE ENCOUNTER
Patient reports:  He has been taking Lisinopril for the last month  He has had dry cough, particularly at night, coughing keeps him up at night  Patient denies illness  Please advise     Routing to provider.    Drea ALEJANDRO Rn

## 2018-12-14 NOTE — TELEPHONE ENCOUNTER
Patient notified of medication change to amlodipine 2.5 mg due to cough with lisinopril  Patient verbalized understanding and reports he will follow up in 1 month to check BP with RN  Sent Rx for amlodipine to [facility] pharmacy per patient request    Drea ALEJANDRO Rn

## 2018-12-14 NOTE — TELEPHONE ENCOUNTER
Stop lisinopril.  Start amlodipine 2.5 mg orally daily.  Reinforce sodium restriction.  Can send Rx to pharmacy of choice once verified.    Recheck BP via RN visit 1 month after start of amlodipine.

## 2018-12-14 NOTE — TELEPHONE ENCOUNTER
Reason for Call:  Other call back    Detailed comments: Pt was started on new B/P med just over a month ago and is having adverse reaction.  He is coughing a lot at night. Dry cough. He did have a cold previously but does not believe its a cold now.  Almost at night and can't sleep.    Phone Number Patient can be reached at: Home number on file [phone] (home)    Best Time: any    Can we leave a detailed message on this number?     Call taken on 12/14/2018 at 11:51 AM by Suyapa Aponte

## 2019-01-25 ENCOUNTER — TELEPHONE (OUTPATIENT)
Dept: FAMILY MEDICINE | Facility: CLINIC | Age: 49
End: 2019-01-25

## 2019-01-25 DIAGNOSIS — K14.8 TONGUE LESION: Primary | ICD-10-CM

## 2019-01-25 NOTE — TELEPHONE ENCOUNTER
Patient called stating that he was seen by a dentist who referred him to see an oral surgeon as he had a lesion on his on interior tongue lesion.     [facility] [place] [facility]. However this place does not take his  Insurance. And he is looking for a referral from Dr Koenig.     Amanda DUVALL  Station

## 2019-01-25 NOTE — TELEPHONE ENCOUNTER
S-(situation): Patient requesting Oral surgery referral    B-(background): Patient called stating that he was seen by a dentist who referred him to see an oral surgeon as he had a lesion on his on interior tongue lesion.        A-(assessment): [facility] [place] [facility]. However this place does not take his  Insurance. And he is looking for a referral from Dr Koenig.     R-(recommendations): Pended oral surgery referral for provider review and recommendation.

## 2019-01-25 NOTE — TELEPHONE ENCOUNTER
[facility]  [phone] and [facility]  [phone]    Patient informed of message below from provider.  Gave numbers to patient.

## 2019-04-25 ENCOUNTER — OFFICE VISIT (OUTPATIENT)
Dept: FAMILY MEDICINE | Facility: CLINIC | Age: 49
End: 2019-04-25
Payer: COMMERCIAL

## 2019-04-25 VITALS
BODY MASS INDEX: 31.25 KG/M2 | HEART RATE: 69 BPM | TEMPERATURE: 96.6 F | HEIGHT: 69 IN | WEIGHT: 211 LBS | DIASTOLIC BLOOD PRESSURE: 89 MMHG | SYSTOLIC BLOOD PRESSURE: 122 MMHG

## 2019-04-25 DIAGNOSIS — I10 UNCONTROLLED HYPERTENSION: ICD-10-CM

## 2019-04-25 DIAGNOSIS — B35.3 TINEA PEDIS, UNSPECIFIED LATERALITY: Primary | ICD-10-CM

## 2019-04-25 PROCEDURE — 99214 OFFICE O/P EST MOD 30 MIN: CPT | Performed by: PHYSICIAN ASSISTANT

## 2019-04-25 RX ORDER — NAFTIFINE HYDROCHLORIDE 10 MG/G
CREAM TOPICAL DAILY
Qty: 60 G | Refills: 1 | Status: SHIPPED | OUTPATIENT
Start: 2019-04-25 | End: 2019-05-23

## 2019-04-25 RX ORDER — AMLODIPINE BESYLATE 2.5 MG/1
2.5 TABLET ORAL DAILY
Qty: 90 TABLET | Refills: 2 | Status: SHIPPED | OUTPATIENT
Start: 2019-04-25 | End: 2020-05-27

## 2019-04-25 ASSESSMENT — MIFFLIN-ST. JEOR: SCORE: 1826.56

## 2019-04-25 NOTE — PATIENT INSTRUCTIONS
Patient Education     Athlete s Foot    Athlete s foot (tinea pedis) is caused by a fungal infection in the skin. It affects the skin between the toes, causing cracks in the skin called fissures. It can also affect the bottom of the foot where it causes dry white scales and peeling of the skin. This infection is more likely to occur when the foot is in hot, sweaty socks and shoes for long periods of time. You may feel itching and burning between your toes. This infection is treated with skin creams or medicine taken by mouth.  Home care  The following are general care guidelines:    It is important to keep the feet dry. Use absorbent cotton socks and change them if they become sweaty. Or wear an open-toe shoe or sandal. Wash the feet at least once a day with soap and water.    Apply the antifungal cream as prescribed. Some antifungal creams are available without a prescription.    It may take a week before the rash starts to improve. It can take about 3 to 4 weeks to completely clear. Continue the medicine until the rash is all gone.    Use over-the-counter antifungal powders or sprays on your feet after exposure to high-risk environments, such as public showers, gyms, and locker rooms. This can help prevent future infections. Wearing appropriate shoes in these situations can help.  Prevention  The following tips may help prevent athlete s foot:    Don't share shoes or socks with someone who has athlete's foot.    Don't walk barefoot in places where a fungal infection can spread quickly such as locker rooms, showers, and swimming pools.    Change socks regularly.    Alternate shoes to assist in drying.  Follow-up care  Follow up with your healthcare provider as recommended if the rash does not improve after 10 days of treatment, or if the rash continues to spread.  When to seek medical care  Get medical attention right away if any of the following occur:    Fever of 100.4 F (38 C) or higher, or as  directed    Increasing redness or swelling of the foot    Infection comes back soon after treatment    Pus draining from cracks in the skin  Date Last Reviewed: 8/1/2016 2000-2018 [employer]. [address] [place] [address], [place] [address]. All rights reserved. This information is not intended as a substitute for professional medical care. Always follow your healthcare professional's instructions.

## 2019-05-15 ENCOUNTER — TELEPHONE (OUTPATIENT)
Dept: FAMILY MEDICINE | Facility: CLINIC | Age: 49
End: 2019-05-15

## 2019-05-15 NOTE — TELEPHONE ENCOUNTER
Oral notifheriberto prior authorization has been submitted, just waiting for determination from insurance.    ZAN OKEEFE

## 2019-05-15 NOTE — TELEPHONE ENCOUNTER
Reason for Call:  Other call back    Detailed comments: Oral wishing to speak with Rama Nunez's nurse.  No other information was given.  Please call and assess. Thank you..Tamar Mccloud    Phone Number Patient can be reached at: Home number on file [phone] (home)      Call taken on 5/15/2019 at 10:26 AM by Tamar Mccloud

## 2019-05-21 ENCOUNTER — TELEPHONE (OUTPATIENT)
Dept: FAMILY MEDICINE | Facility: CLINIC | Age: 49
End: 2019-05-21

## 2019-05-21 DIAGNOSIS — B35.3 TINEA PEDIS OF BOTH FEET: Primary | ICD-10-CM

## 2019-05-21 NOTE — TELEPHONE ENCOUNTER
Reason for Call:  Other prescription    Detailed comments: Oral still waiting for antifungal cream.  Apparently prior authorization was denied from insurance so a different kind of cream needs to re-ordered (?) .  [facility] [place]  Please review and advise.  Thank you..Tamar Mccloud    Phone Number Patient can be reached at: Home number on file [phone] (home)    Best Time: any time    Can we leave a detailed message on this number? YES    Call taken on 5/21/2019 at 2:06 PM by Tamar Mccloud

## 2019-10-31 ENCOUNTER — TELEPHONE (OUTPATIENT)
Dept: FAMILY MEDICINE | Facility: CLINIC | Age: 49
End: 2019-10-31

## 2019-10-31 NOTE — TELEPHONE ENCOUNTER
Reason for Call:  Other call back    Detailed comments: wife Daisy is calling regarding patient.wanted to know if   will see patient as a new patient.  is patient wife doctor.     Phone Number Patient can be reached at: Cell number on file:    Telephone Information:   [phone]        Best Time: anytime-ok to [facility], wife voicemail may say agusta.     Can we leave a detailed message on this number? YES    Call taken on 10/31/2019 at 12:25 PM by Ele Laguna

## 2019-11-07 NOTE — TELEPHONE ENCOUNTER
I called pt and advised that they need to make a new pt appt and that you gave permission.     Lalitha Monge, Station [place]

## 2019-12-10 DIAGNOSIS — B00.1 RECURRENT COLD SORES: ICD-10-CM

## 2019-12-10 NOTE — TELEPHONE ENCOUNTER
"Requested Prescriptions   Pending Prescriptions Disp Refills     valACYclovir (VALTREX) 500 MG tablet [Pharmacy Med Name: ValACYclovir 500MG  TAB] 6 tablet 3     Sig: TAKE 1 TABLET BY MOUTH TWICE DAILY   Last Written Prescription Date:  12/3/18  Last Fill Quantity: 6 tab,  # refills: 3   Last office visit: 4/25/2019 with prescribing provider:  CORBY Nunez   Future Office Visit:        Antivirals for Herpes Protocol Failed - 12/10/2019  2:41 PM        Failed - Recent (12 mo) or future (30 days) visit within the authorizing provider's specialty     Patient has had an office visit with the authorizing provider or a provider within the authorizing providers department within the previous 12 mos or has a future within next 30 days. See \"Patient Info\" tab in inbasket, or \"Choose Columns\" in Meds & Orders section of the refill encounter.              Failed - Normal serum creatinine on file in past 12 months     Recent Labs   Lab Test 10/31/18  0933   CR 1.15             Passed - Patient is age 12 or older        Passed - Medication is active on med list          "

## 2019-12-12 RX ORDER — VALACYCLOVIR HYDROCHLORIDE 500 MG/1
TABLET, FILM COATED ORAL
Qty: 6 TABLET | Refills: 3 | OUTPATIENT
Start: 2019-12-12

## 2019-12-12 NOTE — TELEPHONE ENCOUNTER
Patient has not seen Dr. Koenig since 2018. It appears that patient was discussing having Dr. Luna as PCP (see phone note 10/31/19). Routing to  refill pool for consideration.      JAVIER AquinoN, RN

## 2019-12-12 NOTE — TELEPHONE ENCOUNTER
Pt needs an appt. He saw CORBY Nunez PA-C once, in [place], on 4/25/19 for tinea pedis.    Jemima MARTINO RN

## 2020-02-12 ENCOUNTER — OFFICE VISIT (OUTPATIENT)
Dept: DERMATOLOGY | Facility: CLINIC | Age: 50
End: 2020-02-12
Payer: COMMERCIAL

## 2020-02-12 ENCOUNTER — TELEPHONE (OUTPATIENT)
Dept: DERMATOLOGY | Facility: CLINIC | Age: 50
End: 2020-02-12

## 2020-02-12 VITALS — HEART RATE: 67 BPM | SYSTOLIC BLOOD PRESSURE: 133 MMHG | DIASTOLIC BLOOD PRESSURE: 87 MMHG | OXYGEN SATURATION: 98 %

## 2020-02-12 DIAGNOSIS — L81.4 LENTIGO: ICD-10-CM

## 2020-02-12 DIAGNOSIS — D22.9 MULTIPLE BENIGN NEVI: ICD-10-CM

## 2020-02-12 DIAGNOSIS — L82.1 SEBORRHEIC KERATOSIS: ICD-10-CM

## 2020-02-12 DIAGNOSIS — C44.519 BASAL CELL CARCINOMA (BCC) OF ABDOMEN: ICD-10-CM

## 2020-02-12 DIAGNOSIS — D48.5 NEOPLASM OF UNCERTAIN BEHAVIOR OF SKIN: Primary | ICD-10-CM

## 2020-02-12 DIAGNOSIS — D18.01 CHERRY ANGIOMA: ICD-10-CM

## 2020-02-12 DIAGNOSIS — Z85.828 HISTORY OF BASAL CELL CANCER: ICD-10-CM

## 2020-02-12 DIAGNOSIS — C44.619 BASAL CELL CARCINOMA (BCC) OF LEFT SHOULDER: Primary | ICD-10-CM

## 2020-02-12 PROCEDURE — 88331 PATH CONSLTJ SURG 1 BLK 1SPC: CPT | Performed by: DERMATOLOGY

## 2020-02-12 PROCEDURE — 11102 TANGNTL BX SKIN SINGLE LES: CPT | Performed by: PHYSICIAN ASSISTANT

## 2020-02-12 PROCEDURE — 11103 TANGNTL BX SKIN EA SEP/ADDL: CPT | Performed by: PHYSICIAN ASSISTANT

## 2020-02-12 PROCEDURE — 99214 OFFICE O/P EST MOD 30 MIN: CPT | Mod: 25 | Performed by: PHYSICIAN ASSISTANT

## 2020-02-12 NOTE — LETTER
[facility]  [address] [facility] [facility] [address]  Phone: [phone]       February 12, 2020         Oral Blount  [address] [facility] [address]            Dear Oral:    You are scheduled for Mohs Surgery on March 9th at 7:30 am     We are located at the [facility] in [place]. Please check in at the Dermatology Clinic located on the 2nd floor at the end of the lai.    You don't need to arrive more than 5-10 minutes prior to your appointment time.    Be sure to eat a good breakfast and bathe and wash your hair prior to Surgery.   If you are taking any anti-coagulants that are prescribed by your Doctor (such as Coumadin/warfarin, Plavix, Aspirin, Ibuprofen), please continue taking them.    However, If you are taking anti-coagulants over the counter without a Doctor's order for a Medical condition, please discontinue them 10 days prior to Surgery.      Please wear comfortable clothing as you could possibly be in the clinic for 4-6 hours for your surgery.    Oral Hernadez [phone]

## 2020-02-12 NOTE — TELEPHONE ENCOUNTER
----- Message from Oral Hernadez MD sent at 2/12/2020  8:50 AM CST -----  L upper ab basal cell carcinoma   L post shoulder basal cell carcinoma   Schedule excision

## 2020-02-12 NOTE — NURSING NOTE
"Chief Complaint   Patient presents with     Skin Check       Initial /87 (BP Location: Left arm, Patient Position: Chair, Cuff Size: Adult Large)   Pulse 67   SpO2 98%  Estimated body mass index is 30.93 kg/m  as calculated from the following:    Height as of 4/25/19: 1.759 m (5' 9.26\").    Weight as of 4/25/19: 95.7 kg (211 lb).  Medications and allergies reviewed.    Louise MOLINA CMA ([facility])    "

## 2020-02-12 NOTE — TELEPHONE ENCOUNTER
Spoke with patient and reviewed results. Pt verbalized understanding. Letter and packet mailed. Pt requested both spots be done at one appt. Advised that this may be able to be done but if not then he will need another appt.   Kanwal RUFFIN RN BSN PHN  [facility]

## 2020-02-12 NOTE — PROGRESS NOTES
Oral Blount is a 49 year old year old male patient here today for spot on abdomen. Present for a year, not healing. Denies any pain or bleeding. Patient has no other skin complaints today.  Remainder of the HPI, Meds, PMH, Allergies, FH, and SH was reviewed in chart.    Pertinent Hx:  History of BCC  Past Medical History:   Diagnosis Date     Allergic rhinitis, cause unspecified     otc      Basal cell carcinoma      Tobacco use disorder     quit 1991-3, 2 years smoked.        Past Surgical History:   Procedure Laterality Date     SURGICAL HISTORY OF -   2003    vasectomy        Family History   Problem Relation Age of Onset     Eye Disorder Mother         degenerative     Hypertension Father      Diabetes Father      Cancer Father         BCC     Pacemaker Father      Cancer Paternal Grandmother         lung     Cancer Paternal Grandfather         lung     Melanoma No family hx of        Social History     Socioeconomic History     Marital status:      Spouse name: Not on file     Number of children: Not on file     Years of education: Not on file     Highest education level: Not on file   Occupational History     Employer: [employer]   Social Needs     Financial resource strain: Not on file     Food insecurity:     Worry: Not on file     Inability: Not on file     Transportation needs:     Medical: Not on file     Non-medical: Not on file   Tobacco Use     Smoking status: Never Smoker     Smokeless tobacco: Never Used   Substance and Sexual Activity     Alcohol use: No     Drug use: No     Sexual activity: Yes     Partners: Female   Lifestyle     Physical activity:     Days per week: Not on file     Minutes per session: Not on file     Stress: Not on file   Relationships     Social connections:     Talks on phone: Not on file     Gets together: Not on file     Attends Sabianist service: Not on file     Active member of club or organization: Not on file     Attends meetings of clubs or  organizations: Not on file     Relationship status: Not on file     Intimate partner violence:     Fear of current or ex partner: Not on file     Emotionally abused: Not on file     Physically abused: Not on file     Forced sexual activity: Not on file   Other Topics Concern     Parent/sibling w/ CABG, MI or angioplasty before 65F 55M? No   Social History Narrative     Not on file       Outpatient Encounter Medications as of 2/12/2020   Medication Sig Dispense Refill     amLODIPine (NORVASC) 2.5 MG tablet Take 1 tablet (2.5 mg) by mouth daily 90 tablet 2     terbinafine (LAMISIL) 1 % external gel Apply topically 2 times daily 12 g 3     valACYclovir (VALTREX) 500 MG tablet Take 1 tablet (500 mg) by mouth 2 times daily 6 tablet 3     Facility-Administered Encounter Medications as of 2/12/2020   Medication Dose Route Frequency Provider Last Rate Last Dose     Tdap (tetanus-diphtheria-acell pertussis) (ADACEL) vaccine for Adults or Adolescents 0.5 mL  0.5 mL Intramuscular Once Nadeem Fried MD                 Review Of Systems  Skin: As above  Eyes: negative  Ears/Nose/Throat: negative  Respiratory: No shortness of breath, dyspnea on exertion, cough, or hemoptysis  Cardiovascular: negative  Gastrointestinal: negative  Genitourinary: negative  Musculoskeletal: negative  Neurologic: negative  Psychiatric: negative  Hematologic/Lymphatic/Immunologic: negative  Endocrine: negative      O:   NAD, WDWN, Alert & Oriented, Mood & Affect wnl, Vitals stable   Here today alone   BP (!) 146/88   Pulse 66   SpO2 98%    General appearance normal   Vitals stable   Alert, oriented and in no acute distress      2.0 cm pink pearly plaque on left upper abdomen    1.0 cm pink papule macule on left posterior shoulder   Stuck on papules and brown macules on trunk and ext   Red papules on trunk  Brown papules and macules with regular pigment network and borders      The remainder of skin exam is normal      Eyes:  Conjunctivae/lids:Normal     ENT: Lips: normal    MSK:Normal    Cardiovascular: peripheral edema none    Pulm: Breathing Normal     Neuro/Psych: Orientation:Alert and Orientedx3 ; Mood/Affect:normal     A/P:  1. R/O BCC on left upper abdomen and left posterior shoulder  TANGENTIAL BIOPSY IN HOUSE:  After consent, anesthesia with LEC and prep, tangential excision performed.  No complications and routine wound care.    2. Seborrheic keratosis, lentigo, angioma, benign nevi, History of BCC  BENIGN LESIONS DISCUSSED WITH PATIENT:  I discussed the specifics of tumor, prognosis, and genetics of benign lesions.  I explained that treatment of these lesions would be purely cosmetic and not medically neccessary.  I discussed with patient different removal options including excision, cautery and /or laser.      Nature and genetics of benign skin lesions dicussed with patient.  Signs and Symptoms of skin cancer discussed with patient.  ABCDEs of melanoma reviewed with patient.  Patient encouraged to perform monthly skin exams.  UV precautions reviewed with patient.  Risks of non-melanoma skin cancer discussed with patient   Return to clinic pending biopsy results.

## 2020-02-12 NOTE — LETTER
2/12/2020         RE: Oral Blount  [address] [facility] [address]        Dear Colleague,    Thank you for referring your patient, Oral Blount, to the [facility]. Please see a copy of my visit note below.    Oral Blount is a 49 year old year old male patient here today for spot on abdomen. Present for a year, not healing. Denies any pain or bleeding. Patient has no other skin complaints today.  Remainder of the HPI, Meds, PMH, Allergies, FH, and SH was reviewed in chart.    Pertinent Hx:  History of BCC  Past Medical History:   Diagnosis Date     Allergic rhinitis, cause unspecified     otc      Basal cell carcinoma      Tobacco use disorder     quit 1991-3, 2 years smoked.        Past Surgical History:   Procedure Laterality Date     SURGICAL HISTORY OF -   2003    vasectomy        Family History   Problem Relation Age of Onset     Eye Disorder Mother         degenerative     Hypertension Father      Diabetes Father      Cancer Father         BCC     Pacemaker Father      Cancer Paternal Grandmother         lung     Cancer Paternal Grandfather         lung     Melanoma No family hx of        Social History     Socioeconomic History     Marital status:      Spouse name: Not on file     Number of children: Not on file     Years of education: Not on file     Highest education level: Not on file   Occupational History     Employer: [employer]   Social Needs     Financial resource strain: Not on file     Food insecurity:     Worry: Not on file     Inability: Not on file     Transportation needs:     Medical: Not on file     Non-medical: Not on file   Tobacco Use     Smoking status: Never Smoker     Smokeless tobacco: Never Used   Substance and Sexual Activity     Alcohol use: No     Drug use: No     Sexual activity: Yes     Partners: Female   Lifestyle     Physical activity:     Days per week: Not on file     Minutes per session: Not on file     Stress: Not on  file   Relationships     Social connections:     Talks on phone: Not on file     Gets together: Not on file     Attends Mosque service: Not on file     Active member of club or organization: Not on file     Attends meetings of clubs or organizations: Not on file     Relationship status: Not on file     Intimate partner violence:     Fear of current or ex partner: Not on file     Emotionally abused: Not on file     Physically abused: Not on file     Forced sexual activity: Not on file   Other Topics Concern     Parent/sibling w/ CABG, MI or angioplasty before 65F 55M? No   Social History Narrative     Not on file       Outpatient Encounter Medications as of 2/12/2020   Medication Sig Dispense Refill     amLODIPine (NORVASC) 2.5 MG tablet Take 1 tablet (2.5 mg) by mouth daily 90 tablet 2     terbinafine (LAMISIL) 1 % external gel Apply topically 2 times daily 12 g 3     valACYclovir (VALTREX) 500 MG tablet Take 1 tablet (500 mg) by mouth 2 times daily 6 tablet 3     Facility-Administered Encounter Medications as of 2/12/2020   Medication Dose Route Frequency Provider Last Rate Last Dose     Tdap (tetanus-diphtheria-acell pertussis) (ADACEL) vaccine for Adults or Adolescents 0.5 mL  0.5 mL Intramuscular Once Nadeem Fried MD                 Review Of Systems  Skin: As above  Eyes: negative  Ears/Nose/Throat: negative  Respiratory: No shortness of breath, dyspnea on exertion, cough, or hemoptysis  Cardiovascular: negative  Gastrointestinal: negative  Genitourinary: negative  Musculoskeletal: negative  Neurologic: negative  Psychiatric: negative  Hematologic/Lymphatic/Immunologic: negative  Endocrine: negative      O:   NAD, WDWN, Alert & Oriented, Mood & Affect wnl, Vitals stable   Here today alone   BP (!) 146/88   Pulse 66   SpO2 98%    General appearance normal   Vitals stable   Alert, oriented and in no acute distress      2.0 cm pink pearly plaque on left upper abdomen    1.0 cm pink papule macule on left  posterior shoulder   Stuck on papules and brown macules on trunk and ext   Red papules on trunk  Brown papules and macules with regular pigment network and borders      The remainder of skin exam is normal      Eyes: Conjunctivae/lids:Normal     ENT: Lips: normal    MSK:Normal    Cardiovascular: peripheral edema none    Pulm: Breathing Normal     Neuro/Psych: Orientation:Alert and Orientedx3 ; Mood/Affect:normal     A/P:  1. R/O BCC on left upper abdomen and left posterior shoulder  TANGENTIAL BIOPSY IN HOUSE:  After consent, anesthesia with LEC and prep, tangential excision performed.  No complications and routine wound care.    2. Seborrheic keratosis, lentigo, angioma, benign nevi, History of BCC  BENIGN LESIONS DISCUSSED WITH PATIENT:  I discussed the specifics of tumor, prognosis, and genetics of benign lesions.  I explained that treatment of these lesions would be purely cosmetic and not medically neccessary.  I discussed with patient different removal options including excision, cautery and /or laser.      Nature and genetics of benign skin lesions dicussed with patient.  Signs and Symptoms of skin cancer discussed with patient.  ABCDEs of melanoma reviewed with patient.  Patient encouraged to perform monthly skin exams.  UV precautions reviewed with patient.  Risks of non-melanoma skin cancer discussed with patient   Return to clinic pending biopsy results.         Again, thank you for allowing me to participate in the care of your patient.        Sincerely,        Rosamaria Frausto PA-C

## 2020-02-12 NOTE — PROGRESS NOTES
L upper ab:Orthokeratosis of epidermis with a proliferation of nests of basaloid cells, with peripheral palisading and a haphazard arrangement in the center extending into the dermis, .  The tumor cells have hyperchromatic nuclei. Poor cytoplasm and intercellular bridging.      L post shoulder:Orthokeratosis of epidermis with a proliferation of nests of basaloid cells, with peripheral palisading and a haphazard arrangement in the center extending into the dermis, The tumor cells have hyperchromatic nuclei. Poor cytoplasm and intercellular bridging.        L upper ab basal cell carcinoma   L post shoulder basal cell carcinoma   Schedule excision

## 2020-02-12 NOTE — LETTER
2/12/2020         RE: Oral Blount  [address] [facility] [address]        Dear Colleague,    Thank you for referring your patient, Oral Blount, to the [facility]. Please see a copy of my visit note below.    L upper ab:Orthokeratosis of epidermis with a proliferation of nests of basaloid cells, with peripheral palisading and a haphazard arrangement in the center extending into the dermis, .  The tumor cells have hyperchromatic nuclei. Poor cytoplasm and intercellular bridging.      L post shoulder:Orthokeratosis of epidermis with a proliferation of nests of basaloid cells, with peripheral palisading and a haphazard arrangement in the center extending into the dermis, The tumor cells have hyperchromatic nuclei. Poor cytoplasm and intercellular bridging.        L upper ab basal cell carcinoma   L post shoulder basal cell carcinoma   Schedule excision     Again, thank you for allowing me to participate in the care of your patient.        Sincerely,        Oral Hernadez MD

## 2020-03-05 NOTE — PROGRESS NOTES
Surgical Office Location :   [facility]  [address] [place] [address]

## 2020-03-09 ENCOUNTER — OFFICE VISIT (OUTPATIENT)
Dept: DERMATOLOGY | Facility: CLINIC | Age: 50
End: 2020-03-09
Payer: COMMERCIAL

## 2020-03-09 VITALS — OXYGEN SATURATION: 98 % | HEART RATE: 63 BPM | DIASTOLIC BLOOD PRESSURE: 80 MMHG | SYSTOLIC BLOOD PRESSURE: 139 MMHG

## 2020-03-09 DIAGNOSIS — C44.619 BASAL CELL CARCINOMA (BCC) OF LEFT SHOULDER: ICD-10-CM

## 2020-03-09 DIAGNOSIS — C44.519 BASAL CELL CARCINOMA (BCC) OF ABDOMEN: Primary | ICD-10-CM

## 2020-03-09 PROCEDURE — 88331 PATH CONSLTJ SURG 1 BLK 1SPC: CPT | Mod: 59 | Performed by: DERMATOLOGY

## 2020-03-09 PROCEDURE — 17313 MOHS 1 STAGE T/A/L: CPT | Performed by: DERMATOLOGY

## 2020-03-09 PROCEDURE — 11602 EXC TR-EXT MAL+MARG 1.1-2 CM: CPT | Mod: 59 | Performed by: DERMATOLOGY

## 2020-03-09 NOTE — PROGRESS NOTES
Oral Blount is a 49 year old year old male patient here today for evaluation and managment of basal cell carcinoma on left shoulder and left ab. Patient has no other skin complaints today.  Remainder of the HPI, Meds, PMH, Allergies, FH, and SH was reviewed in chart.      Past Medical History:   Diagnosis Date     Allergic rhinitis, cause unspecified     otc      Basal cell carcinoma      Tobacco use disorder     quit 1991-3, 2 years smoked.        Past Surgical History:   Procedure Laterality Date     SURGICAL HISTORY OF -   2003    vasectomy        Family History   Problem Relation Age of Onset     Eye Disorder Mother         degenerative     Hypertension Father      Diabetes Father      Cancer Father         BCC     Pacemaker Father      Cancer Paternal Grandmother         lung     Cancer Paternal Grandfather         lung     Melanoma No family hx of        Social History     Socioeconomic History     Marital status:      Spouse name: Not on file     Number of children: Not on file     Years of education: Not on file     Highest education level: Not on file   Occupational History     Employer: [employer]   Social Needs     Financial resource strain: Not on file     Food insecurity     Worry: Not on file     Inability: Not on file     Transportation needs     Medical: Not on file     Non-medical: Not on file   Tobacco Use     Smoking status: Never Smoker     Smokeless tobacco: Never Used   Substance and Sexual Activity     Alcohol use: No     Drug use: No     Sexual activity: Yes     Partners: Female   Lifestyle     Physical activity     Days per week: Not on file     Minutes per session: Not on file     Stress: Not on file   Relationships     Social connections     Talks on phone: Not on file     Gets together: Not on file     Attends Adventism service: Not on file     Active member of club or organization: Not on file     Attends meetings of clubs or organizations: Not on file      Relationship status: Not on file     Intimate partner violence     Fear of current or ex partner: Not on file     Emotionally abused: Not on file     Physically abused: Not on file     Forced sexual activity: Not on file   Other Topics Concern     Parent/sibling w/ CABG, MI or angioplasty before 65F 55M? No   Social History Narrative     Not on file       Outpatient Encounter Medications as of 3/9/2020   Medication Sig Dispense Refill     amLODIPine (NORVASC) 2.5 MG tablet Take 1 tablet (2.5 mg) by mouth daily 90 tablet 2     terbinafine (LAMISIL) 1 % external gel Apply topically 2 times daily (Patient not taking: Reported on 3/9/2020) 12 g 3     valACYclovir (VALTREX) 500 MG tablet Take 1 tablet (500 mg) by mouth 2 times daily (Patient not taking: Reported on 3/9/2020) 6 tablet 3     Facility-Administered Encounter Medications as of 3/9/2020   Medication Dose Route Frequency Provider Last Rate Last Dose     Tdap (tetanus-diphtheria-acell pertussis) (ADACEL) vaccine for Adults or Adolescents 0.5 mL  0.5 mL Intramuscular Once Nadeem Fried MD                 Review Of Systems  Skin: As above  Eyes: negative  Ears/Nose/Throat: negative  Respiratory: No shortness of breath, dyspnea on exertion, cough, or hemoptysis  Cardiovascular: negative  Gastrointestinal: negative  Genitourinary: negative  Musculoskeletal: negative  Neurologic: negative  Psychiatric: negative  Hematologic/Lymphatic/Immunologic: negative  Endocrine: negative      O:   NAD, WDWN, Alert & Oriented, Mood & Affect wnl, Vitals stable   Here today alone   /80   Pulse 63   SpO2 98%    General appearance normal   Vitals stable   Alert, oriented and in no acute distress     L shoulder 1cm scaly papule   L ab 2cm scaly papule       Eyes: Conjunctivae/lids:Normal     ENT: Lips, buccal mucosa, tongue: normal    MSK:Normal    Cardiovascular: peripheral edema none    Pulm: Breathing Normal    Neuro/Psych: Orientation:Alert and Orientedx3 ;  Mood/Affect:normal       MICRO:   L shoulder:Unremarkable epidermis with parallel bundles of cellular collagen within the superficial dermis.  No concerning areas for malignancy.     A/P:  1. L shoulder basal cell carcinoma   EXCISION OF BASAL CELL CARCINOMA, Margins confirmed with FROZEN SECTIONS AND Second intent: After thorough discussion of PGACAC, consent obtained, anesthesia and prep, the margins of the lesion were identified and an elliptical incision was made encompassing the lesion with 4mm margin. The incisions were made through the skin and down to and including the superficial dermis.  The lesion was removed en bloc and submitted for frozen section pathologic review. Clear margins obtained (1.6cm).    REPAIR SECOND INTENT: We discussed the options for wound management in full with the patient including risks/benefits/possible outcomes. Decision made to allow the wound to heal by second intention. EBL minimal; complications none; wound care routine.  The patient was discharged in good condition and will return in one month or prn for wound evaluation.     2. L ab basal cell carcinoma   MOHS:   Size    After PGACAC discussed with patient, decision for Mohs surgery was made. Indication for Mohs was Size. Patient confirmed the site with Dr. Hernadez.  After anesthesia with LEC, the tumor was excised using standard Mohs technique in 1 stages(s).  CLEAR MARGINS OBTAINED and Final defect size was 2.7 x 2.6 cm.       REPAIR SECOND INTENT: We discussed the options for wound management in full with the patient including risks/benefits/possible outcomes. Decision made to allow the wound to heal by second intention. EBL minimal; complications none; wound care routine.  The patient was discharged in good condition and will return in one month or prn for wound evaluation.  BENIGN LESIONS DISCUSSED WITH PATIENT:  I discussed the specifics of tumor, prognosis, and genetics of benign lesions.  I explained that treatment  of these lesions would be purely cosmetic and not medically neccessary.  I discussed with patient different removal options including excision, cautery and /or laser.      Nature and genetics of benign skin lesions dicussed with patient.  Signs and Symptoms of skin cancer discussed with patient.  ABCDEs of melanoma reviewed with patient.  Patient encouraged to perform monthly skin exams.  UV precautions reviewed with patient.  Patient to follow up with Primary Care provider regarding elevated blood pressure.  Skin care regimen reviewed with patient: Eliminate harsh soaps, i.e. Dial, zest, irsih spring; Mild soaps such as Cetaphil or Dove sensitive skin, avoid hot or cold showers, aggressive use of emollients including vanicream, cetaphil or cerave discussed with patient.    Risks of non-melanoma skin cancer discussed with patient   Return to clinic 6 months

## 2020-03-09 NOTE — LETTER
3/9/2020         RE: Oral Blount  [address]  [facility] [address]        Dear Colleague,    Thank you for referring your patient, Oral Blount, to the [facility]. Please see a copy of my visit note below.    Surgical Office Location :   [facility]  [address] [place] [address]      Oral Blount is a 49 year old year old male patient here today for evaluation and managment of basal cell carcinoma on left shoulder and left ab. Patient has no other skin complaints today.  Remainder of the HPI, Meds, PMH, Allergies, FH, and SH was reviewed in chart.      Past Medical History:   Diagnosis Date     Allergic rhinitis, cause unspecified     otc      Basal cell carcinoma      Tobacco use disorder     quit 1991-3, 2 years smoked.        Past Surgical History:   Procedure Laterality Date     SURGICAL HISTORY OF -   2003    vasectomy        Family History   Problem Relation Age of Onset     Eye Disorder Mother         degenerative     Hypertension Father      Diabetes Father      Cancer Father         BCC     Pacemaker Father      Cancer Paternal Grandmother         lung     Cancer Paternal Grandfather         lung     Melanoma No family hx of        Social History     Socioeconomic History     Marital status:      Spouse name: Not on file     Number of children: Not on file     Years of education: Not on file     Highest education level: Not on file   Occupational History     Employer: [employer]   Social Needs     Financial resource strain: Not on file     Food insecurity     Worry: Not on file     Inability: Not on file     Transportation needs     Medical: Not on file     Non-medical: Not on file   Tobacco Use     Smoking status: Never Smoker     Smokeless tobacco: Never Used   Substance and Sexual Activity     Alcohol use: No     Drug use: No     Sexual activity: Yes     Partners: Female   Lifestyle     Physical activity     Days per week:  Not on file     Minutes per session: Not on file     Stress: Not on file   Relationships     Social connections     Talks on phone: Not on file     Gets together: Not on file     Attends [employer]ism service: Not on file     Active member of club or organization: Not on file     Attends meetings of clubs or organizations: Not on file     Relationship status: Not on file     Intimate partner violence     Fear of current or ex partner: Not on file     Emotionally abused: Not on file     Physically abused: Not on file     Forced sexual activity: Not on file   Other Topics Concern     Parent/sibling w/ CABG, MI or angioplasty before 65F 55M? No   Social History Narrative     Not on file       Outpatient Encounter Medications as of 3/9/2020   Medication Sig Dispense Refill     amLODIPine (NORVASC) 2.5 MG tablet Take 1 tablet (2.5 mg) by mouth daily 90 tablet 2     terbinafine (LAMISIL) 1 % external gel Apply topically 2 times daily (Patient not taking: Reported on 3/9/2020) 12 g 3     valACYclovir (VALTREX) 500 MG tablet Take 1 tablet (500 mg) by mouth 2 times daily (Patient not taking: Reported on 3/9/2020) 6 tablet 3     Facility-Administered Encounter Medications as of 3/9/2020   Medication Dose Route Frequency Provider Last Rate Last Dose     Tdap (tetanus-diphtheria-acell pertussis) (ADACEL) vaccine for Adults or Adolescents 0.5 mL  0.5 mL Intramuscular Once Nadeem Fried MD                 Review Of Systems  Skin: As above  Eyes: negative  Ears/Nose/Throat: negative  Respiratory: No shortness of breath, dyspnea on exertion, cough, or hemoptysis  Cardiovascular: negative  Gastrointestinal: negative  Genitourinary: negative  Musculoskeletal: negative  Neurologic: negative  Psychiatric: negative  Hematologic/Lymphatic/Immunologic: negative  Endocrine: negative      O:   NAD, WDWN, Alert & Oriented, Mood & Affect wnl, Vitals stable   Here today alone   /80   Pulse 63   SpO2 98%    General appearance  normal   Vitals stable   Alert, oriented and in no acute distress     L shoulder 1cm scaly papule   L ab 2cm scaly papule       Eyes: Conjunctivae/lids:Normal     ENT: Lips, buccal mucosa, tongue: normal    MSK:Normal    Cardiovascular: peripheral edema none    Pulm: Breathing Normal    Neuro/Psych: Orientation:Alert and Orientedx3 ; Mood/Affect:normal       MICRO:   L shoulder:Unremarkable epidermis with parallel bundles of cellular collagen within the superficial dermis.  No concerning areas for malignancy.     A/P:  1. L shoulder basal cell carcinoma   EXCISION OF BASAL CELL CARCINOMA, Margins confirmed with FROZEN SECTIONS AND Second intent: After thorough discussion of PGACAC, consent obtained, anesthesia and prep, the margins of the lesion were identified and an elliptical incision was made encompassing the lesion with 4mm margin. The incisions were made through the skin and down to and including the superficial dermis.  The lesion was removed en bloc and submitted for frozen section pathologic review. Clear margins obtained (1.6cm).    REPAIR SECOND INTENT: We discussed the options for wound management in full with the patient including risks/benefits/possible outcomes. Decision made to allow the wound to heal by second intention. EBL minimal; complications none; wound care routine.  The patient was discharged in good condition and will return in one month or prn for wound evaluation.     2. L ab basal cell carcinoma   MOHS:   Size    After PGACAC discussed with patient, decision for Mohs surgery was made. Indication for Mohs was Size. Patient confirmed the site with Dr. Hernadez.  After anesthesia with LEC, the tumor was excised using standard Mohs technique in 1 stages(s).  CLEAR MARGINS OBTAINED and Final defect size was 2.7 x 2.6 cm.       REPAIR SECOND INTENT: We discussed the options for wound management in full with the patient including risks/benefits/possible outcomes. Decision made to allow the wound  to heal by second intention. EBL minimal; complications none; wound care routine.  The patient was discharged in good condition and will return in one month or prn for wound evaluation.  BENIGN LESIONS DISCUSSED WITH PATIENT:  I discussed the specifics of tumor, prognosis, and genetics of benign lesions.  I explained that treatment of these lesions would be purely cosmetic and not medically neccessary.  I discussed with patient different removal options including excision, cautery and /or laser.      Nature and genetics of benign skin lesions dicussed with patient.  Signs and Symptoms of skin cancer discussed with patient.  ABCDEs of melanoma reviewed with patient.  Patient encouraged to perform monthly skin exams.  UV precautions reviewed with patient.  Patient to follow up with Primary Care provider regarding elevated blood pressure.  Skin care regimen reviewed with patient: Eliminate harsh soaps, i.e. Dial, zest, irsih spring; Mild soaps such as Cetaphil or Dove sensitive skin, avoid hot or cold showers, aggressive use of emollients including vanicream, cetaphil or cerave discussed with patient.    Risks of non-melanoma skin cancer discussed with patient   Return to clinic 6 months      Again, thank you for allowing me to participate in the care of your patient.        Sincerely,        Oral Hernadez MD

## 2020-03-09 NOTE — PATIENT INSTRUCTIONS
Open Wound Care     for ______________        ? No strenuous activity for 48 hours    ? Take Tylenol as needed for discomfort.                                                .         ? Do not drink alcoholic beverages for 48 hours.    ? Keep the pressure bandage in place for 24 hours. If the bandage becomes blood tinged or loose, reinforce it with gauze and tape.        (Refer to the reverse side of this page for management of bleeding).    ? Remove bandage in 24 hours and begin wound care as follows:     1. Clean area with tap water using a Q tip or gauze pad, (shower / bathe normally)  2. Dry wound with Q tip or gauze pad  3. Apply Aquaphor, Vaseline, Polysporin or Bacitracin Ointment with a Q tip    Do NOT use Neosporin Ointment *  4. Cover the wound with a band-aid or nonstick gauze pad and paper tape.  5. Repeat wound care once a day until wound is completely healed.    It is an old wives tale that a wound heals better when it is exposed to air and allowed to dry out. The wound will heal faster with a better cosmetic result if it is kept moist with ointment and covered with a bandage.  Do not let the wound dry out.      Supplies Needed:                Qtips or gauze pads                Polysporin or Bacitracin Ointment                Bandaids or nonstick gauze pads and paper tape    Wound care kits and brown paper tape are available for purchase at   the pharmacy.    BLEEDIN. Use tightly rolled up gauze or cloth to apply direct pressure over the bandage for 20   minutes.  2. Reapply pressure for an additional 20 minutes if necessary  3. Call the office or go to the nearest emergency room if pressure fails to stop the bleeding.  4. Use additional gauze and tape to maintain pressure once the bleeding has stopped.  5. Begin wound care 24 hours after surgery as directed.                  WOUND HEALING    1. One week after surgery a pink / red halo will form around the outside of the wound.   This is new  skin.  2. The center of the wound will appear yellowish white and produce some drainage.  3. The pink halo will slowly migrate in toward the center of the wound until the wound is covered with new shiny pink skin.  4. There will be no more drainage when the wound is completely healed.  5. It will take six months to one year for the redness to fade.  6. The scar may be itchy, tight and sensitive to extreme temperatures for a year after the surgery.  7. Massaging the area several times a day for several minutes after the wound is completely healed will help the scar soften and normalize faster. Begin massage only after healing is complete.      In case of emergency call: Dr Hernadez: [phone]     [facility]: [phone]    [facility]: [phone]

## 2020-05-27 DIAGNOSIS — I10 UNCONTROLLED HYPERTENSION: ICD-10-CM

## 2020-05-27 RX ORDER — AMLODIPINE BESYLATE 2.5 MG/1
TABLET ORAL
Qty: 90 TABLET | Refills: 0 | Status: SHIPPED | OUTPATIENT
Start: 2020-05-27 | End: 2020-10-07

## 2020-05-27 NOTE — TELEPHONE ENCOUNTER
Medication is being filled for 1 time refill only due to:  Patient needs to be seen because it has been more than one year since last visit.   Ronny Bryant RN

## 2020-07-10 ENCOUNTER — VIRTUAL VISIT (OUTPATIENT)
Dept: FAMILY MEDICINE | Facility: OTHER | Age: 50
End: 2020-07-10

## 2020-07-10 NOTE — PROGRESS NOTES
"Date: 07/10/2020 15:59:55  Clinician: Ramya Hobbs  Clinician NPI: [id number]  Patient: Oral Blount  Patient : 1970  Patient Address: [address] [place] [address]  Patient Phone: [phone]  Visit Protocol: URI  Patient Summary:  Oral is a 49 year old ( : 1970 ) male who initiated a Visit for COVID-19 (Coronavirus) evaluation and screening. When asked the question \"Please sign me up to receive news, health information and promotions. \", Oral responded \"No\".    Oral states his symptoms started gradually 3-4 days ago. After his symptoms started, they improved and then got worse again.   His symptoms consist of tooth pain, malaise, a headache, chills, a sore throat, myalgia, a cough, and nasal congestion.   Symptom details     Nasal secretions: The color of his mucus is clear.    Cough: Oral coughs a few times an hour and his cough is not more bothersome at night. Phlegm comes into his throat when he coughs. He believes his cough is caused by post-nasal drip. The color of the phlegm is clear.     Sore throat: Oral reports having mild throat pain (1-3 on a 10 point pain scale), does not have exudate on his tonsils, and can swallow liquids. The lymph nodes in his neck are not enlarged. A rash has not appeared on the skin since the sore throat started.     Headache: He states the headache is mild (1-3 on a 10 point pain scale).     Tooth pain: The tooth pain is not caused by a cavity, recent dental work, or other mouth problems.      Oral denies having wheezing, nausea, ageusia, diarrhea, vomiting, rhinitis, ear pain, enlarged lymph nodes, anosmia, facial pain or pressure, and fever. He also denies having recent facial or sinus surgery in the past 60 days and taking antibiotic medication in the past month. He is not experiencing dyspnea.   Precipitating events  Within the past week, Oral has not been exposed to someone with strep throat. He has not recently been " exposed to someone with influenza. Oral has been in close contact with the following high risk individuals: people with asthma, heart disease or diabetes and adults 65 or older.   Pertinent COVID-19 (Coronavirus) information  In the past 14 days, Oral has not worked in a congregate living setting.   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   Oral also has not lived in a congregate living setting in the past 14 days. He does not live with a healthcare worker.   Oral has not had a close contact with a laboratory-confirmed COVID-19 patient within 14 days of symptom onset.   Pertinent medical history  Oral had 1 sinus infection within the past year.   Oral does not need a return to work/school note.   Weight: 205 lbs   Oral does not smoke or use smokeless tobacco.   Additional information as reported by the patient (free text): My wife and her parents also have similar symptoms.   Weight: 205 lbs    MEDICATIONS: amlodipine-benazepril oral, ALLERGIES: NKDA  Clinician Response:  Dear Oral,   Your symptoms show that you may have coronavirus (COVID-19). This illness can cause fever, cough and trouble breathing. Many people get a mild case and get better on their own. Some people can get very sick.  What should I do?  We would like to test you for this virus.   1. Please call [phone] to schedule your visit. Explain that you were referred by [facility] to have a COVID-19 test. Be ready to share your [facility] visit ID number.  The following will serve as your written order for this COVID Test, ordered by me, for the indication of suspected COVID [Z20.828]: The test will be ordered in [employer], our electronic health record, after you are scheduled. It will  "show as ordered and authorized by Jus Maradiaga MD.  Order: COVID-19 (Coronavirus) PCR for SYMPTOMATIC testing from [facility].      2. When it's time for your COVID test:  Stay at least 6 feet away from others. (If someone will drive you to your test, stay in the backseat, as far away from the  as you can.)   Cover your mouth and nose with a mask, tissue or washcloth.  Go straight to the testing site. Don't make any stops on the way there or back.      3.Starting now: Stay home and away from others (self-isolate) until:   You've had no fever---and no medicine that reduces fever---for 3 full days (72 hours). And...   Your other symptoms have gotten better. For example, your cough or breathing has improved. And...   At least 10 days have passed since your symptoms started.       During this time, don't leave the house except for testing or medical care.   Stay in your own room, even for meals. Use your own bathroom if you can.   Stay away from others in your home. No hugging, kissing or shaking hands. No visitors.  Don't go to work, school or anywhere else.    Clean \"high touch\" surfaces often (doorknobs, counters, handles, etc.). Use a household cleaning spray or wipes. You'll find a full list of  on the EPA website: www.epa.gov/pesticide-registration/list-n-disinfectants-use-against-sars-cov-2.   Cover your mouth and nose with a mask, tissue or washcloth to avoid spreading germs.  Wash your hands and face often. Use soap and water.  Caregivers in these groups are at risk for severe illness due to COVID-19:  o People 65 years and older  o People who live in a nursing home or long-term care facility  o People with chronic disease (lung, heart, cancer, diabetes, kidney, liver, immunologic)  o People who have a weakened immune system, including those who:   Are in cancer treatment  Take medicine that weakens the immune system, such as corticosteroids  Had a bone marrow or organ transplant  Have an immune " deficiency  Have poorly controlled HIV or AIDS  Are obese (body mass index of 40 or higher)  Smoke regularly   o Caregivers should wear gloves while washing dishes, handling laundry and cleaning bedrooms and bathrooms.  o Use caution when washing and drying laundry: Don't shake dirty laundry, and use the warmest water setting that you can.  o For more tips, go to www.cdc.gov/coronavirus/2019-ncov/downloads/10Things.pdf.       How can I take care of myself?   Get lots of rest. Drink extra fluids (unless a doctor has told you not to).   Take Tylenol (acetaminophen) for fever or pain. If you have liver or kidney problems, ask your family doctor if it's okay to take Tylenol.   Adults can take either:    650 mg (two 325 mg pills) every 4 to 6 hours, or...   1,000 mg (two 500 mg pills) every 8 hours as needed.    Note: Don't take more than 3,000 mg in one day. Acetaminophen is found in many medicines (both prescribed and over-the-counter medicines). Read all labels to be sure you don't take too much.   For children, check the Tylenol bottle for the right dose. The dose is based on the child's age or weight.    If you have other health problems (like cancer, heart failure, an organ transplant or severe kidney disease): Call your specialty clinic if you don't feel better in the next 2 days.       Know when to call 911. Emergency warning signs include:    Trouble breathing or shortness of breath Pain or pressure in the chest that doesn't go away Feeling confused like you haven't felt before, or not being able to wake up Bluish-colored lips or face.  Where can I get more information?   [facility] -- About COVID-19: [email or web address]   CDC -- What to Do If You're Sick: www.cdc.gov/coronavirus/2019-ncov/about/steps-when-sick.html   CDC -- Ending Home Isolation: www.cdc.gov/coronavirus/2019-ncov/hcp/disposition-in-home-patients.html   CDC -- Caring for Someone:  www.cdc.gov/coronavirus/2019-ncov/if-you-are-sick/care-for-someone.html   [facility] -- Interim Guidance for Hospital Discharge to Home: www.health.[facility].[place]/diseases/coronavirus/hcp/hospdischarge.pdf   [facility] clinical trials (COVID-19 research studies): clinicalaffairs.[facility]-clinical-trials    Below are the COVID-19 hotlines at the [place] Department of Health ([facility]). Interpreters are available.    For health questions: Call 157-484-7433 or 1-989.267.1813 (7 a.m. to 7 p.m.) For questions about schools and childcare: Call 503-705-9401 or 1-611.228.9409 (7 a.m. to 7 p.m.)    Diagnosis: Myalgia  Diagnosis ICD: M79.1

## 2020-07-11 DIAGNOSIS — Z20.822 SUSPECTED COVID-19 VIRUS INFECTION: Primary | ICD-10-CM

## 2020-07-11 PROCEDURE — U0003 INFECTIOUS AGENT DETECTION BY NUCLEIC ACID (DNA OR RNA); SEVERE ACUTE RESPIRATORY SYNDROME CORONAVIRUS 2 (SARS-COV-2) (CORONAVIRUS DISEASE [COVID-19]), AMPLIFIED PROBE TECHNIQUE, MAKING USE OF HIGH THROUGHPUT TECHNOLOGIES AS DESCRIBED BY CMS-2020-01-R: HCPCS | Performed by: FAMILY MEDICINE

## 2020-07-13 LAB
SARS-COV-2 RNA SPEC QL NAA+PROBE: ABNORMAL
SPECIMEN SOURCE: ABNORMAL

## 2020-07-14 ENCOUNTER — TELEPHONE (OUTPATIENT)
Dept: URGENT CARE | Facility: URGENT CARE | Age: 50
End: 2020-07-14

## 2020-07-14 NOTE — LETTER
July 14, 2020        Oral Blount  [address] [address]    This letter provides a written record that you were tested for COVID-19 on 7/11/2020.     Your result was positive. This means you have COVID-19 (coronavirus).    This means that we found the virus that causes COVID-19 in your sample.    How can I protect others?If you have symptoms, stay home and away from others (self-isolate) until:You ve had no fever--and no medicine that reduces fever--for 3 full days (72 hours). And      Your other symptoms have gotten better. For example, your cough or breathing has improved. And     At least 10 days have passed since your symptoms started.    If you don t have symptoms: Stay home and away from others (self-isolate) until at least 10 days have passed since your first positive COVID-19 test.    During this time:    Stay in your own room, including for meals. Use your own bathroom if you can.    Stay away from others in your home. No hugging, kissing or shaking hands. No visitors.     Don t go to work, school or anywhere else.     Clean  high touch  surfaces often (doorknobs, counters, handles, etc.). Use a household cleaning spray or wipes. You ll find a full list on the EPA website at www.epa.gov/pesticide-registration/list-n-disinfectants-use-against-sars-cov-2.     Cover your mouth and nose with a mask, tissue or washcloth to avoid spreading germs.    Wash your hands and face often with soap and water.    Caregivers in these groups are at risk for severe illness due to COVID-19:  o People 65 years and older  o People who live in a nursing home or long-term care facility  o People with chronic disease (lung, heart, cancer, diabetes, kidney, liver, immunologic)  o People who have a weakened immune system, including those who:  - Are in cancer treatment  - Take medicine that weakens the immune system, such as corticosteroids  - Had a bone marrow or organ transplant  - Have an immune  deficiency  - Have poorly controlled HIV or AIDS  - Are obese (body mass index of 40 or higher)  - Smoke regularly    Caregivers should wear gloves while washing dishes, handling laundry and cleaning bedrooms and bathrooms.    Wash and dry laundry with special caution. Don t shake dirty laundry, and use the warmest water setting you can.    If you have a weakened immune system, ask your doctor about other actions you should take.    For more tips, go to www.cdc.gov/coronavirus/2019-ncov/downloads/10Things.pdf.    You should not go back to work until you meet the guidelines above for ending your home isolation. You should meet these along with any other guidelines that your employer has.    Employers: This document serves as formal notice of your employee s medical guidelines for going back to work. They must meet the above guidelines before going back to work in person.    How can I take care of myself?    1. Get lots of rest. Drink extra fluids (unless a doctor has told you not to).    2. Take Tylenol (acetaminophen) for fever or pain. If you have liver or kidney problems, ask your family doctor if it s okay to take Tylenol.     Take either:     650 mg (two 325 mg pills) every 4 to 6 hours, or     1,000 mg (two 500 mg pills) every 8 hours as needed.     Note: Don t take more than 3,000 mg in one day. Acetaminophen is found in many medicines (both prescribed and over-the-counter medicines). Read all labels to be sure you don t take too much.    For children, check the Tylenol bottle for the right dose (based on their age or weight).    3. If you have other health problems (like cancer, heart failure, an organ transplant or severe kidney disease): Call your specialty clinic if you don t feel better in the next 2 days.    4. Know when to call 911: Emergency warning signs include:    Trouble breathing or shortness of breath    Pain or pressure in the chest that doesn t go away    Feeling confused like you haven t felt  before, or not being able to wake up    Bluish-colored lips or face    5. Sign up for [employer]. We know it s scary to hear that you have COVID-19. We want to track your symptoms to make sure you re okay over the next 2 weeks. Please look for an email from [employer]--this is a free, online program that we ll use to keep in touch. To sign up, follow the link in the email. Learn more at [email or web address].      Where can I get more information?    [facility]: [email or web address]    Coronavirus Basics: www.health.[facility].[place]/diseases/coronavirus/basics.html    What to Do If You re Sick: www.cdc.gov/coronavirus/2019-ncov/about/steps-when-sick.html    Ending Home Isolation: www.cdc.gov/coronavirus/2019-ncov/hcp/disposition-in-home-patients.html     Caring for Someone with COVID-19: www.cdc.gov/coronavirus/2019-ncov/if-you-are-sick/care-for-someone.html     [facility] clinical trials (COVID-19 research studies): clinicalaffairs.[facility]-clinical-trials

## 2020-07-14 NOTE — TELEPHONE ENCOUNTER
"Coronavirus (COVID-19) Notification    Patient  Oral    Reason for call  Notify of Positive Coronavirus (COVID-19) lab results, assess symptoms,  review [facility] recommendations    Lab Result    Lab test:  2019-nCoV rRt-PCR or SARS-CoV-2 PCR    Oropharyngeal AND/OR nasopharyngeal swabs is POSITIVE for 2019-nCoV RNA/SARS-COV-2 PCR (COVID-19 virus)    RN Recommendations/Instructions per [facility] Coronavirus COVID-19 recommendations    Brief introduction script  Hi, My name is Kenisha and I am calling on behalf of [employer] [place].  We were notified that your Coronavirus test (COVID-19) for was POSITIVE for the virus.  I have some information to relay to you but first I wanted to mention that the [place] Dept of Health will be contacting you shortly [it's possible MD already called Patient] to talk to you more about how you are feeling and other people you have had contact with who might now also have the virus.  Also, [facility] is Partnering with the [facility] for Covid-19 research, you may be contacted directly by research staff.    Assessment (Inquire about Patient's current symptoms)  9:30AM: Patient is feeling better today. \"This is the third day that I haven't had a fever. Yesterday and today I feel pretty normal.\"     If at time of call, Patients symptoms hare worsened, the Patient should contact 911 or have someone drive them to Emergency Dept promptly:      If Patient calling 911, inform 911 personal that you have tested positive for the Coronavirus (COVID-19).  Place mask on and await 911 to arrive.    If Emergency Dept, If possible, please have another adult drive you to the Emergency Dept but you need to wear mask when in contact with other people.      Review information with Patient    Your result was positive. This means you have COVID-19 (coronavirus).  We have sent you a letter that reviews the information that I'll be reviewing with you now.    How can I protect " others?    If you have symptoms: stay home and away from others (self-isolate) until:    You've had no fever--and no medicine that reduces fever--for 3 full days (72 hours). And      Your other symptoms have gotten better. For example, your cough or breathing has improved. And     At least 10 days have passed since your symptoms started.    If you don't have symptoms: Stay home and away from others (self-isolate) until at least 10 days have passed since your first positive COVID-19 test. (Date test collected)    During this time:    Stay in your own room, including for meals. Use your own bathroom if you can.    Stay away from others in your home. No hugging, kissing or shaking hands. No visitors.     Don't go to work, school or anywhere else.     Clean  high touch  surfaces often (doorknobs, counters, handles, etc.). Use a household cleaning spray or wipes. You'll find a full list on the EPA website at www.epa.gov/pesticide-registration/list-n-disinfectants-use-against-sars-cov-2.     Cover your mouth and nose with a mask, tissue or washcloth to avoid spreading germs.    Wash your hands and face often with soap and water.    Caregivers in these groups are at risk for severe illness due to COVID-19:  o People 65 years and older  o People who live in a nursing home or long-term care facility  o People with chronic disease (lung, heart, cancer, diabetes, kidney, liver, immunologic)  o People who have a weakened immune system, including those who:  - Are in cancer treatment  - Take medicine that weakens the immune system, such as corticosteroids  - Had a bone marrow or organ transplant  - Have an immune deficiency  - Have poorly controlled HIV or AIDS  - Are obese (body mass index of 40 or higher)  - Smoke regularly    Caregivers should wear gloves while washing dishes, handling laundry and cleaning bedrooms and bathrooms.    Wash and dry laundry with special caution. Don't shake dirty laundry, and use the warmest  water setting you can.    If you have a weakened immune system, ask your doctor about other actions you should take.    For more tips, go to www.cdc.gov/coronavirus/2019-ncov/downloads/10Things.pdf.    You should not go back to work until you meet the guidelines above for ending your home isolation. You should meet these along with any other guidelines that your employer has.    Employers: This document serves as formal notice of your employee's medical guidelines for going back to work. They must meet the above guidelines before going back to work in person.    How can I take care of myself?    1. Get lots of rest. Drink extra fluids (unless a doctor has told you not to).    2. Take Tylenol (acetaminophen) for fever or pain. If you have liver or kidney problems, ask your family doctor if it's okay to take Tylenol.     Take either:     650 mg (two 325 mg pills) every 4 to 6 hours, or     1,000 mg (two 500 mg pills) every 8 hours as needed.     Note: Don't take more than 3,000 mg in one day. Acetaminophen is found in many medicines (both prescribed and over-the-counter medicines). Read all labels to be sure you don't take too much.    For children, check the Tylenol bottle for the right dose (based on their age or weight).    3. If you have other health problems (like cancer, heart failure, an organ transplant or severe kidney disease): Call your specialty clinic if you don't feel better in the next 2 days.    4. Know when to call 911: Emergency warning signs include:    Trouble breathing or shortness of breath    Pain or pressure in the chest that doesn't go away    Feeling confused like you haven't felt before, or not being able to wake up    Bluish-colored lips or face    5. Sign up for [employer]. We know it's scary to hear that you have COVID-19. We want to track your symptoms to make sure you're okay over the next 2 weeks. Please look for an email from [employer]--this is a free, online program that we'll  use to keep in touch. To sign up, follow the link in the email. Learn more at [email or web address].    Where can I get more information?    [facility]: [email or web address]    Coronavirus Basics: www.[email or web address]/diseases/coronavirus/basics.html    What to Do If You're Sick: www.cdc.gov/coronavirus/2019-ncov/about/steps-when-sick.html    Ending Home Isolation: www.cdc.gov/coronavirus/2019-ncov/hcp/disposition-in-home-patients.html     Caring for Someone with COVID-19: www.cdc.gov/coronavirus/2019-ncov/if-you-are-sick/care-for-someone.html     [facility] clinical trials (COVID-19 research studies): [email or web address].[facility]-clinical-trials     Positive COVID-19 letter sent (Yes/No):  Yes    [Name]  Kenisha Cochran RN  [facility] RN  Lung Nodule and ED Lab Result RN  Epic pool (ED late result f/u RN): [id number]  [facility] INCIDENTAL RADIOLOGY F/U NURSES: P [id number]  # [phone]

## 2020-10-05 DIAGNOSIS — I10 UNCONTROLLED HYPERTENSION: ICD-10-CM

## 2020-10-07 RX ORDER — AMLODIPINE BESYLATE 2.5 MG/1
2.5 TABLET ORAL DAILY
Qty: 30 TABLET | Refills: 0 | Status: SHIPPED | OUTPATIENT
Start: 2020-10-07 | End: 2021-01-22

## 2020-10-07 NOTE — TELEPHONE ENCOUNTER
"Requested Prescriptions   Pending Prescriptions Disp Refills     amLODIPine (NORVASC) 2.5 MG tablet [Pharmacy Med Name: amLODIPine Besylate 2.5 MG Oral Tablet] 90 tablet 0     Sig: TAKE 1 TABLET BY MOUTH ONCE DAILY . APPOINTMENT REQUIRED FOR FUTURE REFILLS       Calcium Channel Blockers Protocol  Failed - 10/5/2020  7:53 AM        Failed - Normal serum creatinine on file in past 12 months     Recent Labs   Lab Test 10/31/18  0933   CR 1.15       Ok to refill medication if creatinine is low          Passed - Blood pressure under 140/90 in past 12 months     BP Readings from Last 3 Encounters:   03/09/20 139/80   02/12/20 133/87   04/25/19 122/89                 Passed - Recent (12 mo) or future (30 days) visit within the authorizing provider's specialty     Patient has had an office visit with the authorizing provider or a provider within the authorizing providers department within the previous 12 mos or has a future within next 30 days. See \"Patient Info\" tab in inbasket, or \"Choose Columns\" in Meds & Orders section of the refill encounter.              Passed - Medication is active on med list        Passed - Patient is age 18 or older             "

## 2020-12-15 DIAGNOSIS — I10 UNCONTROLLED HYPERTENSION: ICD-10-CM

## 2020-12-15 RX ORDER — AMLODIPINE BESYLATE 2.5 MG/1
TABLET ORAL
Qty: 30 TABLET | Refills: 0 | OUTPATIENT
Start: 2020-12-15

## 2020-12-15 NOTE — TELEPHONE ENCOUNTER
"Requested Prescriptions   Pending Prescriptions Disp Refills     amLODIPine (NORVASC) 2.5 MG tablet [Pharmacy Med Name: amLODIPine Besylate 2.5 MG Oral Tablet] 30 tablet 0     Sig: Take 1 tablet by mouth once daily       Calcium Channel Blockers Protocol  Failed - 12/15/2020  8:44 AM        Failed - Recent (12 mo) or future (30 days) visit within the authorizing provider's specialty     Patient has had an office visit with the authorizing provider or a provider within the authorizing providers department within the previous 12 mos or has a future within next 30 days. See \"Patient Info\" tab in inbasket, or \"Choose Columns\" in Meds & Orders section of the refill encounter.              Failed - Normal serum creatinine on file in past 12 months     Recent Labs   Lab Test 10/31/18  0933   CR 1.15       Ok to refill medication if creatinine is low          Passed - Blood pressure under 140/90 in past 12 months     BP Readings from Last 3 Encounters:   03/09/20 139/80   02/12/20 133/87   04/25/19 122/89                 Passed - Medication is active on med list        Passed - Patient is age 18 or older             "

## 2021-01-22 ENCOUNTER — VIRTUAL VISIT (OUTPATIENT)
Dept: FAMILY MEDICINE | Facility: CLINIC | Age: 51
End: 2021-01-22
Payer: COMMERCIAL

## 2021-01-22 DIAGNOSIS — J34.89 SINUS PRESSURE: ICD-10-CM

## 2021-01-22 DIAGNOSIS — I10 ESSENTIAL HYPERTENSION: ICD-10-CM

## 2021-01-22 DIAGNOSIS — K29.00 ACUTE GASTRITIS WITHOUT HEMORRHAGE, UNSPECIFIED GASTRITIS TYPE: Primary | ICD-10-CM

## 2021-01-22 PROCEDURE — 99214 OFFICE O/P EST MOD 30 MIN: CPT | Mod: 95 | Performed by: PHYSICIAN ASSISTANT

## 2021-01-22 RX ORDER — AMLODIPINE BESYLATE 2.5 MG/1
2.5 TABLET ORAL DAILY
Qty: 90 TABLET | Refills: 1 | Status: SHIPPED | OUTPATIENT
Start: 2021-01-22 | End: 2021-04-19

## 2021-01-22 RX ORDER — FAMOTIDINE 20 MG/1
20 TABLET, FILM COATED ORAL 2 TIMES DAILY
Qty: 180 TABLET | Refills: 0 | Status: SHIPPED | OUTPATIENT
Start: 2021-01-22 | End: 2021-04-19

## 2021-01-22 NOTE — PROGRESS NOTES
Oral is a 50 year old who is being evaluated via a billable video visit.      How would you like to obtain your AVS? MyChart  If the video visit is dropped, the invitation should be resent by: Text to cell phone: [phone]  Will anyone else be joining your video visit? No    Video Start Time: 1:57 PM  Assessment & Plan     ASSESSMENT/PLAN:      ICD-10-CM    1. Acute gastritis without hemorrhage, unspecified gastritis type  K29.00 omeprazole (PRILOSEC) 20 MG DR capsule     famotidine (PEPCID) 20 MG tablet   2. Essential hypertension  I10 amLODIPine (NORVASC) 2.5 MG tablet   3. Sinus pressure  J34.89      Suspect pain related to gastritis. Pain has been improving. Red flag signs to be seen urgently were discussed.     Patient Instructions   Start prilosec each morning for the next few weeks  Use pepcid two times daily  Be seen if symptoms worsen, high fever, severe weakness or fainting, increased abdominal pain, blood in stool or vomit, not keeping down fluids or urinating, or failure to improve in 2-3 days.       Afrin decongestant spray 3-4 days  mychart me next week if not improving    Restart amlodipine  Monitor blood pressure - if it is getting lower, we can try stopping the amlodipine again and monitoring  Goal  <130/80    Return in about 3 days (around 1/25/2021) for if not improving or if worsening.    CHAIM Del Rosario [facility] MILKA Mixon is a 50 year old who presents to clinic today for the following health issues     HPI       Hypertension Follow-up    Has been out of meds for a couple months     Do you check your blood pressure regularly outside of the clinic? Yes wrist 128/85 recently 140/90's then sat in chair and got 125/85 the other night.  Lost 10 lbs since end of year.     Are you following a low salt diet? Yes    Are your blood pressures ever more than 140 on the top number (systolic) OR more   than 90 on the bottom number (diastolic), for example  "140/90? No     He has been out of blood pressure meds \"a while\"  Checks pretty frequently before bed. Today was higher.   Working on exercise and diet      Abdominal/Flank Pain  Onset/Duration: over a week   Description:   Character: Dull ache and discomfort   Location: left upper quadrant  Radiation: Back and groin   Intensity: mild  Progression of Symptoms:  same  Accompanying Signs & Symptoms:  Fever/Chills: no  Gas/Bloating: YES  Nausea: YES- couple times   Vomitting: no  Diarrhea: no  Constipation: no, though more frequent bowel movements 4-5 times a day   Dysuria or Hematuria: no  History:   Trauma: no  Previous similar pain: no  Previous tests done: none  Precipitating factors:   Does the pain change with:     Food: YES- feels more pressure.  When wakes up in morning feels better     Bowel Movement: no    Urination: no   Other factors:  no  Therapies tried and outcome: Mylanta no help.  Increased water intake.  Lighter diet.     10-12 years ago he had what felt like a pulled muscle LUQ, normal EGD, took reflux pills, resolved eventually.  A lot of stress over past 4-6 weeks, but not past 10 days  Feels almost a pulled muscle. Worse after he eats. Increased BMs 3-4 times daily, but not loose/maybe softer than normal. Has also changed diet/increased water a lot.  Feels better the past couple days than when it started - almost cancelled appointment   Pain is LUQ, but some pain in the left testicle - very mild/maybe a little. He has had the testicular pain in the past, not sure it's the same  In general he has a lot of heartburn. Eats spicy foods a lot. mtn dew - has cut back to 1. Takes a lot of tums regularly.   Sister has diverticulitis recurrently    Went to the dentist a while ago - teeth sensitivity. Told he had a sinus infection - prescribed antibiotics.   He has had an issue for a couple weeks. He has nasal congestion, sinus headache. No other symptoms. Coughing in AM from postnasal drip. Was taking " tylenol sinus/cold  He had COVID-19 this summer.     Review of Systems   Other than noted above, general, HEENT, respiratory, cardiac, MS, and gastrointestinal systems are negative.       Objective           Vitals:  No vitals were obtained today due to virtual visit.    Physical Exam   GENERAL: Healthy, alert and no distress  EYES: Eyes grossly normal to inspection.  No discharge or erythema, or obvious scleral/conjunctival abnormalities.  RESP: No audible wheeze, cough, or visible cyanosis.  No visible retractions or increased work of breathing.    SKIN: Visible skin clear. No significant rash, abnormal pigmentation or lesions.  NEURO: Cranial nerves grossly intact.  Mentation and speech appropriate for age.  PSYCH: Mentation appears normal, affect normal/bright, judgement and insight intact, normal speech and appearance well-groomed.    Patient palpates abdomen, not tender to LLQ or LUQ, not tender to palpation on belly        Video-Visit Details    Type of service:  Video Visit    Video End Time:2:26 PM    Originating Location (pt. Location): Home    Distant Location (provider location):  [facility]     Platform used for Video Visit: [place] [place]

## 2021-01-22 NOTE — PATIENT INSTRUCTIONS
Start prilosec each morning for the next few weeks  Use pepcid two times daily  Be seen if symptoms worsen, high fever, severe weakness or fainting, increased abdominal pain, blood in stool or vomit, not keeping down fluids or urinating, or failure to improve in 2-3 days.       Afrin decongestant spray 3-4 days  mychart me next week if not improving    Restart amlodipine  Monitor blood pressure - if it is getting lower, we can try stopping the amlodipine again and monitoring  Goal <130/80

## 2021-02-06 ENCOUNTER — HEALTH MAINTENANCE LETTER (OUTPATIENT)
Age: 51
End: 2021-02-06

## 2021-04-06 ENCOUNTER — OFFICE VISIT (OUTPATIENT)
Dept: FAMILY MEDICINE | Facility: CLINIC | Age: 51
End: 2021-04-06
Payer: COMMERCIAL

## 2021-04-06 VITALS
WEIGHT: 200.8 LBS | OXYGEN SATURATION: 97 % | HEIGHT: 69 IN | SYSTOLIC BLOOD PRESSURE: 130 MMHG | BODY MASS INDEX: 29.74 KG/M2 | DIASTOLIC BLOOD PRESSURE: 96 MMHG | HEART RATE: 73 BPM | TEMPERATURE: 97.8 F

## 2021-04-06 DIAGNOSIS — K92.89 GAS BLOAT SYNDROME: Primary | ICD-10-CM

## 2021-04-06 DIAGNOSIS — R12 HEARTBURN: ICD-10-CM

## 2021-04-06 PROCEDURE — 99213 OFFICE O/P EST LOW 20 MIN: CPT | Performed by: NURSE PRACTITIONER

## 2021-04-06 ASSESSMENT — MIFFLIN-ST. JEOR: SCORE: 1758.26

## 2021-04-06 NOTE — PROGRESS NOTES
Assessment & Plan     Gas bloat syndrome   Information handout given on symptoms management. Recommended Beano with meals, and use GasX as needed.   Discussed some diet changes that may help.  Follow-up if any persistent symptoms.      Heartburn  EGD done in 2009 for more regular symptoms and this was negative.  I do not feel EGD is needed at this time for intermittent symptoms.  Recommend Pepcid and TUMS or Mylanta or equivalent over the counter for intermittent symptoms.     FOLLOW UP PLANS     Return in about 2 weeks (around 4/20/2021), or if symptoms worsen or fail to improve.    Kenna Maurice NP  [facility]    Pacheco Mixon is a 50 year old who presents for the following health issues     HPI     GERD/Heartburn  Onset/Duration: first week of January   Description: heartburn   Intensity: mild today   Progression of Symptoms: waxing and waning  Accompanying Signs & Symptoms:  Does it feel like food gets stuck or trouble swallowing: no  Nausea: YES- from time to time   Vomiting (bloody?): no  Abdominal Pain: LUQ, Lower abdomen/bloating, epigastric pain   Black-Tarry stools: no  Bloody stools: no  Feels good in the morning and all day. Eating dinner causes the heartburn.   Feels gassy; burps every time he eats. Feels like he needs to sit up and let the pressure out by belching.   History:  Previous similar episodes: YES  Previous ulcers: no  Precipitating factors:   Caffeine use: YES- drinks a few ounces of soda a day   Alcohol use: no  NSAID/Aspirin use: Some but mainly Tylenol   Tobacco use: no  Worse with red meat and fatty foods.  Concerned that he may have an ulcer or diverticulitis   Alleviating factors: Mylanta sometimes   Therapies tried and outcome:             Lifestyle changes: reduced caffeine/pop intake, lost 10 pounds since Fall.             Medications: Omeprazole (Prilosec), tums, and Pepcid (famotidine)- did not like side effects so he stopped taking, Mylanta.  "     Patient stated that indigestion symptoms started in January indigestion. He started feeling cramping and bloating in the stomach after eating especially at dinner time. Discomfort is intermittent and feels like pressure not pain. Pressure is present in the left lower quadrant and radiate  to the lower left back. Patient reported pressure and burning with heartburn symptoms occasionally in the epigastric area. He admits that heartburn is less frequent.  Patients eat 3 meals during the day. He was prescribed Zantac and Prilosec which were ineffective in the past. Patient reported having anxiety when his son was going through his health issues but not at this time.    Review of Systems   CONSTITUTIONAL:No fever or chills.  RESP:No cough or SOB  GI: Intermittent heartburn, gas and bloating especially after dinner, intermittent constipation  PSYCHIATRIC: No changes in mood or affect      Objective    BP (!) 130/96   Pulse 73   Temp 97.8  F (36.6  C) (Tympanic)   Ht 1.74 m (5' 8.5\")   Wt 91.1 kg (200 lb 12.8 oz)   SpO2 97%   BMI 30.09 kg/m    Body mass index is 30.09 kg/m .     Physical Exam   GENERAL: healthy, alert and no distress  RESP: lungs sound are clear on auscultation.  ABDOMEN: No abdominal pain, abdomenis soft, nontender, no hepatosplenomegaly, no masses and bowel sounds are active and present in all quadrants.   PSYCH: mentation appears normal, affect normal/bright.    "

## 2021-04-06 NOTE — PATIENT INSTRUCTIONS
1. Take Beano with meals, and use GasX as needed.  2.  Handout given on lifestyle changes.  Make sure you are increasing water. Decreasing carbonated beverages and caffeine.  3.  You can use Mylanta to coat the stomach, take TUMs or pepcid for acute heartburn symptoms.  4.  Only use omeprazole if symptoms of heartburn are persistent.  5.  Follow-up if any persistent symptoms.    Patient Education     Excess Gas   Certain foods produce gas when digested. In some people, these foods make an excessive amount of gas. This may cause bloating, burping, or increased gas passing through the rectum (flatulence).  Foods that cause gas  These foods are more likely to cause this problem. Limit them, or remove them from your diet:    Broccoli    Cauliflower    [place] sprouts    Cabbage    Cooked dried beans    Fizzy (carbonated) drinks, such as sparkling water, soda, beer, and champagne  Other causes  Other causes of excess gas include:    Eating too fast or talkingwhile you chew. This may cause you to swallow air. This increases the amount of gas in your stomach. And it may make your symptoms worse. Chew each mouthful completely before you swallow. Take your time.    Chewing on gum or sucking on hard candy. These cause you to swallow more often. And some of what you are swallowing is air. This leads to more gas in your stomach. Avoid chewing gum and hard candy.    Overeating. This may increase the feeling of being bloated and cause more gas. When you are full, stop eating.     Being constipated. This can increase the amount of normal intestinal gas. Prevent constipation by getting more fiber in your diet. Good sources of fiber include whole-grain cereal, fresh vegetables (except those in the above list), and fresh fruits. High-fiber foods absorb water and carry it out of the body. When adding more fiber to your diet, you also need to drink more water. Drink at least 8, 8-ounce glasses of water (2 quarts) per day.  [employer]  last reviewed this educational content on 10/1/2019    4053-1152 [employer]. All rights reserved. This information is not intended as a substitute for professional medical care. Always follow your healthcare professional's instructions.

## 2021-04-18 DIAGNOSIS — K29.00 ACUTE GASTRITIS WITHOUT HEMORRHAGE, UNSPECIFIED GASTRITIS TYPE: ICD-10-CM

## 2021-04-19 ENCOUNTER — OFFICE VISIT (OUTPATIENT)
Dept: FAMILY MEDICINE | Facility: CLINIC | Age: 51
End: 2021-04-19
Payer: COMMERCIAL

## 2021-04-19 VITALS
SYSTOLIC BLOOD PRESSURE: 138 MMHG | OXYGEN SATURATION: 99 % | HEART RATE: 66 BPM | DIASTOLIC BLOOD PRESSURE: 90 MMHG | BODY MASS INDEX: 29.92 KG/M2 | RESPIRATION RATE: 16 BRPM | TEMPERATURE: 96.5 F | HEIGHT: 69 IN | WEIGHT: 202 LBS

## 2021-04-19 DIAGNOSIS — K29.00 ACUTE GASTRITIS WITHOUT HEMORRHAGE, UNSPECIFIED GASTRITIS TYPE: ICD-10-CM

## 2021-04-19 DIAGNOSIS — R10.9 LEFT FLANK PAIN: ICD-10-CM

## 2021-04-19 DIAGNOSIS — I10 BENIGN ESSENTIAL HYPERTENSION: ICD-10-CM

## 2021-04-19 DIAGNOSIS — Z00.00 ROUTINE GENERAL MEDICAL EXAMINATION AT A HEALTH CARE FACILITY: Primary | ICD-10-CM

## 2021-04-19 LAB
ALBUMIN UR-MCNC: NEGATIVE MG/DL
ANION GAP SERPL CALCULATED.3IONS-SCNC: 3 MMOL/L (ref 3–14)
APPEARANCE UR: CLEAR
BILIRUB UR QL STRIP: NEGATIVE
BUN SERPL-MCNC: 17 MG/DL (ref 7–30)
CALCIUM SERPL-MCNC: 8.6 MG/DL (ref 8.5–10.1)
CHLORIDE SERPL-SCNC: 109 MMOL/L (ref 94–109)
CHOLEST SERPL-MCNC: 143 MG/DL
CO2 SERPL-SCNC: 29 MMOL/L (ref 20–32)
COLOR UR AUTO: YELLOW
CREAT SERPL-MCNC: 1.14 MG/DL (ref 0.66–1.25)
GFR SERPL CREATININE-BSD FRML MDRD: 75 ML/MIN/{1.73_M2}
GLUCOSE SERPL-MCNC: 84 MG/DL (ref 70–99)
GLUCOSE UR STRIP-MCNC: NEGATIVE MG/DL
HBA1C MFR BLD: 4.9 % (ref 0–5.6)
HDLC SERPL-MCNC: 40 MG/DL
HGB UR QL STRIP: NEGATIVE
KETONES UR STRIP-MCNC: NEGATIVE MG/DL
LDLC SERPL CALC-MCNC: 83 MG/DL
LEUKOCYTE ESTERASE UR QL STRIP: NEGATIVE
NITRATE UR QL: NEGATIVE
NONHDLC SERPL-MCNC: 103 MG/DL
PH UR STRIP: 7.5 PH (ref 5–7)
POTASSIUM SERPL-SCNC: 4.1 MMOL/L (ref 3.4–5.3)
SODIUM SERPL-SCNC: 141 MMOL/L (ref 133–144)
SOURCE: ABNORMAL
SP GR UR STRIP: 1.01 (ref 1–1.03)
TRIGL SERPL-MCNC: 100 MG/DL
UROBILINOGEN UR STRIP-ACNC: 0.2 EU/DL (ref 0.2–1)

## 2021-04-19 PROCEDURE — 36415 COLL VENOUS BLD VENIPUNCTURE: CPT | Performed by: FAMILY MEDICINE

## 2021-04-19 PROCEDURE — 81003 URINALYSIS AUTO W/O SCOPE: CPT | Performed by: FAMILY MEDICINE

## 2021-04-19 PROCEDURE — 99214 OFFICE O/P EST MOD 30 MIN: CPT | Mod: 25 | Performed by: FAMILY MEDICINE

## 2021-04-19 PROCEDURE — 80061 LIPID PANEL: CPT | Performed by: FAMILY MEDICINE

## 2021-04-19 PROCEDURE — 83036 HEMOGLOBIN GLYCOSYLATED A1C: CPT | Performed by: FAMILY MEDICINE

## 2021-04-19 PROCEDURE — 80048 BASIC METABOLIC PNL TOTAL CA: CPT | Performed by: FAMILY MEDICINE

## 2021-04-19 PROCEDURE — 99396 PREV VISIT EST AGE 40-64: CPT | Performed by: FAMILY MEDICINE

## 2021-04-19 RX ORDER — FAMOTIDINE 20 MG/1
20 TABLET, FILM COATED ORAL 2 TIMES DAILY
Qty: 180 TABLET | Refills: 0 | Status: SHIPPED | OUTPATIENT
Start: 2021-04-19 | End: 2021-06-03

## 2021-04-19 RX ORDER — FAMOTIDINE 20 MG/1
TABLET, FILM COATED ORAL
Qty: 180 TABLET | Refills: 0 | OUTPATIENT
Start: 2021-04-19

## 2021-04-19 ASSESSMENT — MIFFLIN-ST. JEOR: SCORE: 1763.71

## 2021-04-19 NOTE — RESULT ENCOUNTER NOTE
Most recent lab testing including BMP (kidney function), and urinalysis has returned satisfactory without signs of infection or blood in the urine.    Diabetic screen has returned satisfactory, cholesterol overall looks great and within normal limits.    Continue with plan to proceed with CT scan for further evaluation and cares of your flank pain.

## 2021-04-19 NOTE — PROGRESS NOTES
3  SUBJECTIVE:   CC: Oral Blount is an 50 year old male who presents for preventive health visit.       Patient has been advised of split billing requirements and indicates understanding: Yes  Healthy Habits:    Do you get at least three servings of calcium containing foods daily (dairy, green leafy vegetables, etc.)? yes    Amount of exercise or daily activities, outside of work: 3-4 day(s) per week    Problems taking medications regularly Yes has not been taking his blood pressure medication for 3-4 months, he does not think he needs it. He lost weight (10#) and changed jobs.    Medication side effects: No    Have you had an eye exam in the past two years? yes    Do you see a dentist twice per year? no    Do you have sleep apnea, excessive snoring or daytime drowsiness?no    Cholesterol: Will update cholesterol today.   Aspirin: Will update cholesterol and calculate ASCVD to determine if candidate or not.   Diabetes: Obtain screening today.   Colon Cancer: Due at age 51, is scheduled to get one soon due to current abdominal symptoms.   Diet/Exercise: active with exercise.   Tobacco: never use      Abdominal pain:     Onset: since January    Description (location/character/radiation/duration): middle of abdomen below sternum and flank pain. Worse after dinner.     Intensity:  moderate    Accompanying signs and symptoms:        Shortness of breath: no        Sweating: no        Nausea/vomitting: YES- nausea when hungry       Palpitations: no        Other (fevers/chills/cough/heartburn/lightheadedness): no     History (similar episodes/previous evaluation): he is scheduled in May for upper an lower GI scoping with [facility].    Precipitating or alleviating factors:       Worse with exertion: YES- if bending over       Worse with breathing: no        Related to eating: YES       Better with burping: YES    Therapies tried and outcome: Prilosec and Pepcid    Back and left flank pain. This has been present for last  couple months, but now is occurring more frequently. Will occasionally have left flank pain with radiation into the groin.   No fevers or chills.   No dysuria, or hematuria.   He has been evaluated by [facility] who recommended an endoscopy and colonoscopy. Will continue with Omeprazole and famotidine per [facility] recommendations.     Hypertension   In the past was on amlodipine.  He has stopped this a few months ago.  Has lost 10 lbs recently.   BP good during morning and evening. Higher during the day.   Currently averaging 120/75-85 at home.   Off medication for months.   He would prefer to be off the medication at this time.   No chest pain or shortness of breath at rest or with activity.       Today's PHQ-2 Score:   PHQ-2 ( 1999 [employer]) 4/19/2021 4/6/2021   Q1: Little interest or pleasure in doing things 0 0   Q2: Feeling down, depressed or hopeless 0 0   PHQ-2 Score 0 0       Abuse: Current or Past(Physical, Sexual or Emotional)- No  Do you feel safe in your environment? Yes    Have you ever done Advance Care Planning? (For example, a Health Directive, POLST, or a discussion with a medical provider or your loved ones about your wishes): No, advance care planning information given to patient to review.  Patient declined advance care planning discussion at this time.    Social History     Tobacco Use     Smoking status: Never Smoker     Smokeless tobacco: Never Used   Substance Use Topics     Alcohol use: No     If you drink alcohol do you typically have >3 drinks per day or >7 drinks per week? No                      Last PSA: No results found for: PSA    Reviewed orders with patient. Reviewed health maintenance and updated orders accordingly - Yes      Reviewed and updated as needed this visit by clinical staff  Tobacco  Allergies  Meds   Med Hx  Surg Hx  Fam Hx  Soc Hx          ROS:  Negative except as noted in HPI.    OBJECTIVE:   BP (!) 138/90 (BP Location: Right arm, Patient Position: Chair, Cuff Size: Adult  "Regular)   Pulse 66   Temp 96.5  F (35.8  C) (Tympanic)   Resp 16   Ht 1.74 m (5' 8.5\")   Wt 91.6 kg (202 lb)   SpO2 99%   BMI 30.27 kg/m    EXAM:  General: alert, cooperative, no acute distress   HEENT: NC/AT, PERRL, TM's without signs of infection, nares patent, oropharynx clear and non-erythematous.   CV: RRR, no murmur  Resp: non-labored breathing, clear to auscultation, no wheezing or rales   Abdomen: Soft, non-tender, no guarding. No rebound. Naik sign negative. Non-tender to palpation over left flank and posterior left.   Extremities: No peripheral edema, calves non-tender.       ASSESSMENT/PLAN:   Oral was seen today for physical and chest pain.    Diagnoses and all orders for this visit:    Routine general medical examination at a health care facility  Scheduled for colonoscopy. Will update Cholesterol and diabetic screening today.  -     Lipid panel reflex to direct LDL Non-fasting  -     Hemoglobin A1c    Acute gastritis without hemorrhage, unspecified gastritis type  Follows with [facility]. Is scheduled to proceed with Endoscopy and also Colonoscopy in May of 2021.   -     famotidine (PEPCID) 20 MG tablet; Take 1 tablet (20 mg) by mouth 2 times daily  -     omeprazole (PRILOSEC) 20 MG DR capsule; Take 1 capsule (20 mg) by mouth daily    Left flank pain  - Has been present for a couple months, but now more consistent. Will obtain urine, blood work, and CT without contrast to rule out kidney stone, as this is likely culprit for his pain. No fevers or chills. No dysuria making UTI, Pyelonephritis less likely.   -     Basic metabolic panel  -     UA reflex to Microscopic and Culture  -     CT Abdomen w/o Contrast; Future    Benign essential hypertension  Currently not on any medications at this time. In past was on amlodipine. Is going to continue to work on diet and exercise, and continue to monitor closely at home. If BP elevated greater than 140/90 recommend return visit or going back on BP " "medication. Prior was on amlodipine.     Patient has been advised of split billing requirements and indicates understanding: Yes  COUNSELING:  Reviewed preventive health counseling, as reflected in patient instructions       Regular exercise       Healthy diet/nutrition       Alcohol Use        Colon cancer screening    Estimated body mass index is 30.27 kg/m  as calculated from the following:    Height as of this encounter: 1.74 m (5' 8.5\").    Weight as of this encounter: 91.6 kg (202 lb).    Weight management plan: Discussed healthy diet and exercise guidelines    He reports that he has never smoked. He has never used smokeless tobacco.      Counseling Resources:  ATP IV Guidelines  Pooled Cohorts Equation Calculator  FRAX Risk Assessment  ICSI Preventive Guidelines  Dietary Guidelines for Americans, 2010  USDA's MyPlate  ASA Prophylaxis  Lung CA Screening    Eric Barajas DO  [facility]  "

## 2021-04-19 NOTE — PATIENT INSTRUCTIONS
Continue with current GI medications and follow up.     Flank pain, lab work, urine studies, and CT scan     Hypertension continue to monitor.     Lab work for cholesterol and diabetes.     Preventive Health Recommendations  Male Ages 40 to 49    Yearly exam:             See your health care provider every year in order to  o   Review health changes.   o   Discuss preventive care.    o   Review your medicines if your doctor has prescribed any.    You should be tested each year for STDs (sexually transmitted diseases) if you re at risk.     Have a cholesterol test every 5 years.     Have a colonoscopy (test for colon cancer) if someone in your family has had colon cancer or polyps before age 50.     After age 45, have a diabetes test (fasting glucose). If you are at risk for diabetes, you should have this test every 3 years.      Talk with your health care provider about whether or not a prostate cancer screening test (PSA) is right for you.    Shots: Get a flu shot each year. Get a tetanus shot every 10 years.     Nutrition:    Eat at least 5 servings of fruits and vegetables daily.     Eat whole-grain bread, whole-wheat pasta and brown rice instead of white grains and rice.     Get adequate Calcium and Vitamin D.     Lifestyle    Exercise for at least 150 minutes a week (30 minutes a day, 5 days a week). This will help you control your weight and prevent disease.     Limit alcohol to one drink per day.     No smoking.     Wear sunscreen to prevent skin cancer.     See your dentist every six months for an exam and cleaning.

## 2021-04-20 ENCOUNTER — HOSPITAL ENCOUNTER (OUTPATIENT)
Dept: CT IMAGING | Facility: CLINIC | Age: 51
Discharge: HOME | End: 2021-04-20
Attending: FAMILY MEDICINE | Admitting: FAMILY MEDICINE
Payer: COMMERCIAL

## 2021-04-20 DIAGNOSIS — R10.9 LEFT FLANK PAIN: ICD-10-CM

## 2021-04-20 PROCEDURE — 74176 CT ABD & PELVIS W/O CONTRAST: CPT

## 2021-04-21 ENCOUNTER — MYC MEDICAL ADVICE (OUTPATIENT)
Dept: FAMILY MEDICINE | Facility: CLINIC | Age: 51
End: 2021-04-21

## 2021-04-21 NOTE — RESULT ENCOUNTER NOTE
Most recent CT scan of the abdomen has returned satisfactory.  Continue with plan to meet with [facility].  If continues to have persistent flank pain please return to clinic for further evaluation and cares but at this time lab work and CT were satisfactory.

## 2021-04-22 NOTE — TELEPHONE ENCOUNTER
Patient should reach out to [facility] to see if they feel it is appropriate to move up endoscopy and colonoscopy. He has already established with them.

## 2021-06-03 ENCOUNTER — OFFICE VISIT (OUTPATIENT)
Dept: FAMILY MEDICINE | Facility: CLINIC | Age: 51
End: 2021-06-03
Payer: COMMERCIAL

## 2021-06-03 ENCOUNTER — ANCILLARY PROCEDURE (OUTPATIENT)
Dept: GENERAL RADIOLOGY | Facility: CLINIC | Age: 51
End: 2021-06-03
Attending: NURSE PRACTITIONER
Payer: COMMERCIAL

## 2021-06-03 VITALS
OXYGEN SATURATION: 98 % | WEIGHT: 205 LBS | HEART RATE: 70 BPM | DIASTOLIC BLOOD PRESSURE: 100 MMHG | HEIGHT: 69 IN | BODY MASS INDEX: 30.36 KG/M2 | TEMPERATURE: 96.2 F | SYSTOLIC BLOOD PRESSURE: 138 MMHG

## 2021-06-03 DIAGNOSIS — R21 RASH: Primary | ICD-10-CM

## 2021-06-03 DIAGNOSIS — L72.0 EPIDERMOID CYST OF FINGER OF LEFT HAND: ICD-10-CM

## 2021-06-03 PROCEDURE — 99213 OFFICE O/P EST LOW 20 MIN: CPT | Performed by: NURSE PRACTITIONER

## 2021-06-03 PROCEDURE — 73130 X-RAY EXAM OF HAND: CPT | Mod: LT | Performed by: RADIOLOGY

## 2021-06-03 RX ORDER — TRIAMCINOLONE ACETONIDE 1 MG/G
OINTMENT TOPICAL 2 TIMES DAILY
Qty: 30 G | Refills: 1 | Status: SHIPPED | OUTPATIENT
Start: 2021-06-03 | End: 2022-02-17

## 2021-06-03 ASSESSMENT — MIFFLIN-ST. JEOR: SCORE: 1777.31

## 2021-06-03 NOTE — PROGRESS NOTES
"    Assessment & Plan     Rash  - triamcinolone (KENALOG) 0.1 % external ointment; Apply topically 2 times daily for up to 2 weeks.  If no improvement, should see the dermatologist.    Epidermoid cyst of finger of left hand  Exam c/w cyst.  Xray negative for other pathology.  Advised patient that if the cyst becomes painful or interferes with ROM/activity, he can see the hand surgeon for removal.  - XR Hand Left G/E 3 Views; Future      The risks, benefits and treatment options of prescribed medications or other treatments have been discussed with the patient. The patient verbalized their understanding and should call or follow up if no improvement or if they develop further problems.    WOOD Aleman CNP  [facility] is a 50 year old who presents for the following health issues     HPI     Musculoskeletal problem/pain  Onset/Duration: recent  Description  Location: fingers - left  Middle and ring fingers  Patient has a lump on middle finger- knuckle area  And a \"knot\" at the base of his ring finger  Joint Swelling: no  Redness: no  Pain: tender to the touch  Warmth: no  Intensity:  mild  Progression of Symptoms:  same  Accompanying signs and symptoms:   Fevers: no  Numbness/tingling/weakness: no  History  Trauma to the area: no  Recent illness:  no  Previous similar problem: no  Previous evaluation:  no  Precipitating or alleviating factors:  Aggravating factors include: overuse  Therapies tried and outcome: nothing          Review of Systems   Constitutional, HEENT, cardiovascular, pulmonary, gi and gu systems are negative, except as otherwise noted.        Objective    BP (!) 138/100 (BP Location: Left arm)   Pulse 70   Temp 96.2  F (35.7  C) (Tympanic)   Ht 1.74 m (5' 8.5\")   Wt 93 kg (205 lb)   SpO2 98%   BMI 30.72 kg/m    Body mass index is 30.72 kg/m .  Physical Exam   GENERAL: healthy, alert and no distress  MS: base of left ring finger, palmar " surface - 2 mm round, semi-firm, mobile nodule c/w cyst.  SKIN: left middle finger, dorsal surface, over the PIP - erythematous patch 3mm x 5mm    XRAY: negative

## 2021-07-17 ENCOUNTER — HOSPITAL ENCOUNTER (EMERGENCY)
Facility: CLINIC | Age: 51
Discharge: HOME | End: 2021-07-17
Attending: EMERGENCY MEDICINE | Admitting: EMERGENCY MEDICINE
Payer: COMMERCIAL

## 2021-07-17 VITALS
SYSTOLIC BLOOD PRESSURE: 131 MMHG | BODY MASS INDEX: 29.62 KG/M2 | OXYGEN SATURATION: 96 % | RESPIRATION RATE: 16 BRPM | DIASTOLIC BLOOD PRESSURE: 86 MMHG | HEART RATE: 78 BPM | TEMPERATURE: 98.6 F | WEIGHT: 200 LBS | HEIGHT: 69 IN

## 2021-07-17 DIAGNOSIS — S01.112A LACERATION OF LEFT EYEBROW, INITIAL ENCOUNTER: ICD-10-CM

## 2021-07-17 PROCEDURE — 12011 RPR F/E/E/N/L/M 2.5 CM/<: CPT | Performed by: EMERGENCY MEDICINE

## 2021-07-17 PROCEDURE — 250N000009 HC RX 250

## 2021-07-17 PROCEDURE — 99282 EMERGENCY DEPT VISIT SF MDM: CPT | Mod: 25 | Performed by: EMERGENCY MEDICINE

## 2021-07-17 PROCEDURE — 99283 EMERGENCY DEPT VISIT LOW MDM: CPT | Performed by: EMERGENCY MEDICINE

## 2021-07-17 ASSESSMENT — ENCOUNTER SYMPTOMS
HEADACHES: 0
CONFUSION: 0
APPETITE CHANGE: 0
LIGHT-HEADEDNESS: 0
WOUND: 1
NUMBNESS: 0

## 2021-07-17 ASSESSMENT — MIFFLIN-ST. JEOR: SCORE: 1762.57

## 2021-07-18 NOTE — ED PROVIDER NOTES
History     Chief Complaint   Patient presents with     Laceration     got hit above left eye with a softball after someone threw it, no LOC     HPI  Oral Blount is a 50 year old male with no significant past medical history presenting for evaluation of a laceration to his left eyebrow.  Patient was playing softball and was going to catch a ball when it was knocked off the tip of his glove and hit him in the eyebrow.  No loss of conscious.  Otherwise feeling well.  Patient reports applied ice to the wound immediately.  He also then went home and wash the wound thoroughly with soap and water before coming into the ED due to ongoing oozing bleeding.  Not on blood thinners.    Allergies:  Allergies   Allergen Reactions     Lisinopril Cough       Problem List:    Patient Active Problem List    Diagnosis Date Noted     Non morbid obesity, unspecified obesity type 11/05/2018     Priority: Medium     Uncontrolled hypertension 10/31/2018     Priority: Medium     Gout 09/21/2011     Priority: Medium     LUQ abdominal pain 06/17/2008     Priority: Medium     relation w food -suspect gastric/gerd issue - vs constipation.        Left great toe MTP pain 12/08/2007     Priority: Medium     possible neuroma - large sesamoid? on xray.  no injuries.trial change footwear, see podiatry       Left JOINT PAIN-SHLDER 12/08/2007     Priority: Medium     brief exam - suspicious for overuse injury - PT .  xray appears wnl.       Allergic rhinitis      Priority: Medium     otc   Problem list name updated by automated process. Provider to review          Past Medical History:    Past Medical History:   Diagnosis Date     Allergic rhinitis, cause unspecified      Basal cell carcinoma      Tobacco use disorder        Past Surgical History:    Past Surgical History:   Procedure Laterality Date     SURGICAL HISTORY OF -   2003    vasectomy       Family History:    Family History   Problem Relation Age of Onset     Eye Disorder Mother        "  degenerative     Hypertension Father      Diabetes Father      Cancer Father         BCC     Pacemaker Father      Cancer Paternal Grandmother         lung     Cancer Paternal Grandfather         lung     Hypertension Brother      Diverticulitis Sister      Melanoma No family hx of        Social History:  Marital Status:   [2]  Social History     Tobacco Use     Smoking status: Never Smoker     Smokeless tobacco: Never Used   Substance Use Topics     Alcohol use: No     Drug use: No        Medications:    Fexofenadine HCl (ALLERGY 24-HR PO)  Nutritional Supplements (ADULT NUTRITIONAL SUPPLEMENT + OR)  triamcinolone (KENALOG) 0.1 % external ointment          Review of Systems   Constitutional: Negative for appetite change.   Skin: Positive for wound (Left eyebrow).   Neurological: Negative for light-headedness, numbness and headaches.   Psychiatric/Behavioral: Negative for confusion.   All other systems reviewed and are negative.      Physical Exam   BP: 131/86  Pulse: 78  Temp: 98.6  F (37  C)  Resp: 16  Height: 175.3 cm (5' 9\")  Weight: 90.7 kg (200 lb) (stated)  SpO2: 96 %      Physical Exam  Vitals and nursing note reviewed.   HENT:      Head:     Cardiovascular:      Pulses: Normal pulses.   Pulmonary:      Effort: Pulmonary effort is normal.   Skin:     General: Skin is warm and dry.      Capillary Refill: Capillary refill takes less than 2 seconds.   Neurological:      Mental Status: He is alert and oriented to person, place, and time.   Psychiatric:         Mood and Affect: Mood normal.         ED [facility]    -Laceration Repair    Date/Time: 7/17/2021 9:40 PM  Performed by: Cliff Julian MD  Authorized by: Cliff Julian MD       ANESTHESIA (see MAR for exact dosages):     Anesthesia method:  Local infiltration    Local anesthetic:  Lidocaine 1% WITH epi  LACERATION DETAILS     Location:  Face    Face location:  L eyebrow    Length " (cm):  2    Depth (mm):  2    REPAIR TYPE:     Repair type:  Simple      EXPLORATION:     Contaminated: no      TREATMENT:     Visualized foreign bodies/material removed: no      SKIN REPAIR     Repair method:  Sutures    Suture size:  6-0    Suture material:  Nylon    Suture technique:  Simple interrupted    Number of sutures:  5    APPROXIMATION     Approximation:  Close    POST-PROCEDURE DETAILS     Dressing:  Antibiotic ointment      PROCEDURE   Patient Tolerance:  Patient tolerated the procedure well with no immediate complications                      No results found for this or any previous visit (from the past 24 hour(s)).    Medications - No data to display    Assessments & Plan (with Medical Decision Making)  50-year-old male presenting for evaluation of laceration to his left eyebrow.  Was playing softball in the ball struck his glove at the tip of the glove falling onto his eyebrow.  Suffered a laceration.  Patient applied ice and cleaned the wound at home.  Had ongoing mild bleeding so came in for evaluation.  On exam he has an area of bruising and a 2 cm laceration.  Area cleaned and repaired as above with good approximation.  Applied antibiotic ointment and advised patient to follow-up for suture removal in 5 to 7 days.  Return precautions given regarding infection.     I have reviewed the nursing notes.    I have reviewed the findings, diagnosis, plan and need for follow up with the patient.       New Prescriptions    No medications on file       Final diagnoses:   Laceration of left eyebrow, initial encounter       7/17/2021   [facility] EMERGENCY DEPT     Julian, Cliff Hernandez MD  07/17/21 [id number]

## 2021-09-12 ENCOUNTER — HEALTH MAINTENANCE LETTER (OUTPATIENT)
Age: 51
End: 2021-09-12

## 2021-10-28 DIAGNOSIS — I10 UNCONTROLLED HYPERTENSION: ICD-10-CM

## 2021-10-29 RX ORDER — AMLODIPINE BESYLATE 2.5 MG/1
TABLET ORAL
Qty: 90 TABLET | Refills: 0 | Status: SHIPPED | OUTPATIENT
Start: 2021-10-29 | End: 2022-02-17

## 2021-10-29 NOTE — TELEPHONE ENCOUNTER
Routing refill request to provider for review/approval because:  Drug not active on patient's medication list  Routing to Dr. Barajas as patient was last seen by provider -  Not seen by Dr. Vazquez in year    Amor Jose RN

## 2022-02-07 ENCOUNTER — IMMUNIZATION (OUTPATIENT)
Dept: FAMILY MEDICINE | Facility: CLINIC | Age: 52
End: 2022-02-07
Payer: COMMERCIAL

## 2022-02-07 PROCEDURE — 91305 COVID-19,PF,PFIZER (12+ YRS): CPT

## 2022-02-07 PROCEDURE — 0052A COVID-19,PF,PFIZER (12+ YRS): CPT

## 2022-02-08 ENCOUNTER — TRANSFERRED RECORDS (OUTPATIENT)
Dept: FAMILY MEDICINE | Facility: CLINIC | Age: 52
End: 2022-02-08

## 2022-02-17 ENCOUNTER — VIRTUAL VISIT (OUTPATIENT)
Dept: FAMILY MEDICINE | Facility: CLINIC | Age: 52
End: 2022-02-17
Payer: COMMERCIAL

## 2022-02-17 DIAGNOSIS — I10 HYPERTENSION, UNSPECIFIED TYPE: Primary | ICD-10-CM

## 2022-02-17 PROCEDURE — 99213 OFFICE O/P EST LOW 20 MIN: CPT | Mod: 95 | Performed by: FAMILY MEDICINE

## 2022-02-17 RX ORDER — AMLODIPINE BESYLATE 2.5 MG/1
TABLET ORAL
Qty: 90 TABLET | Refills: 3 | Status: SHIPPED | OUTPATIENT
Start: 2022-02-17 | End: 2022-06-14

## 2022-02-17 NOTE — PROGRESS NOTES
Catarino is a 51 year old who is being evaluated via a billable telephone visit.      What phone number would you like to be contacted at? [phone]  How would you like to obtain your AVS? Anshulhart    Assessment & Plan         Hypertension, unspecified type  Medication refilled.recommend that he come in for labs and a physical.  - amLODIPine (NORVASC) 2.5 MG tablet; TAKE 1 TABLET BY MOUTH ONCE DAILY .        FUTURE APPOINTMENTS:       - Follow-up visit in one month    Return in about 4 weeks (around 3/17/2022) for Follow up.    Carol Carpio MD  [facility]    Subjective   Catarino is a 51 year old who presents for the following health issues     HPI patient is a 51-year-old male presenting for blood pressure medication refills.  He reports that he checks his blood pressure almost every day and yesterday checked it was in the 130s over 80s.  Reports no side effects to his medication.  He was reminded that he will be needing lab works on as he had lab work about a year ago.  He reports that he is planning on getting a physical soon and will do labs at the time.  No other acute symptoms today.    Hypertension Follow-up      Do you check your blood pressure regularly outside of the clinic? Yes     Are you following a low salt diet? Yes    Are your blood pressures ever more than 140 on the top number (systolic) OR more   than 90 on the bottom number (diastolic), for example 140/90? No      How many servings of fruits and vegetables do you eat daily?  2-3    On average, how many sweetened beverages do you drink each day (Examples: soda, juice, sweet tea, etc.  Do NOT count diet or artificially sweetened beverages)?   3    How many days per week do you exercise enough to make your heart beat faster? 5    How many minutes a day do you exercise enough to make your heart beat faster? 60 or more    How many days per week do you miss taking your medication? 0    Medication Followup of  amlodipine    Taking Medication as prescribed: yes    Side Effects:  None    Medication Helping Symptoms:  yes         Review of Systems   Constitutional, HEENT, cardiovascular, pulmonary, gi and gu systems are negative, except as otherwise noted.      Objective           Vitals:  No vitals were obtained today due to virtual visit.    Physical Exam   healthy, alert and no distress  PSYCH: Alert and oriented times 3; coherent speech, normal   rate and volume, able to articulate logical thoughts, able   to abstract reason, no tangential thoughts, no hallucinations   or delusions  His affect is normal  RESP: No cough, no audible wheezing, able to talk in full sentences  Remainder of exam unable to be completed due to telephone visits            Phone call duration: 5 minutes

## 2022-06-14 ENCOUNTER — OFFICE VISIT (OUTPATIENT)
Dept: FAMILY MEDICINE | Facility: CLINIC | Age: 52
End: 2022-06-14
Payer: COMMERCIAL

## 2022-06-14 VITALS
HEART RATE: 64 BPM | HEIGHT: 69 IN | SYSTOLIC BLOOD PRESSURE: 124 MMHG | OXYGEN SATURATION: 98 % | RESPIRATION RATE: 16 BRPM | DIASTOLIC BLOOD PRESSURE: 94 MMHG | BODY MASS INDEX: 30.78 KG/M2 | TEMPERATURE: 97.1 F | WEIGHT: 207.8 LBS

## 2022-06-14 DIAGNOSIS — I10 HYPERTENSION, UNSPECIFIED TYPE: ICD-10-CM

## 2022-06-14 DIAGNOSIS — Z00.00 ENCOUNTER FOR ROUTINE ADULT HEALTH EXAMINATION WITHOUT ABNORMAL FINDINGS: Primary | ICD-10-CM

## 2022-06-14 DIAGNOSIS — Z00.00 ROUTINE GENERAL MEDICAL EXAMINATION AT A HEALTH CARE FACILITY: ICD-10-CM

## 2022-06-14 DIAGNOSIS — Z11.4 SCREENING FOR HIV (HUMAN IMMUNODEFICIENCY VIRUS): ICD-10-CM

## 2022-06-14 DIAGNOSIS — Z12.11 SCREEN FOR COLON CANCER: ICD-10-CM

## 2022-06-14 DIAGNOSIS — E61.1 LOW SERUM IRON: ICD-10-CM

## 2022-06-14 DIAGNOSIS — Z11.59 NEED FOR HEPATITIS C SCREENING TEST: ICD-10-CM

## 2022-06-14 DIAGNOSIS — Z12.5 SCREENING FOR PROSTATE CANCER: ICD-10-CM

## 2022-06-14 LAB
BASOPHILS # BLD AUTO: 0 10E3/UL (ref 0–0.2)
BASOPHILS NFR BLD AUTO: 0 %
EOSINOPHIL # BLD AUTO: 0.1 10E3/UL (ref 0–0.7)
EOSINOPHIL NFR BLD AUTO: 2 %
ERYTHROCYTE [DISTWIDTH] IN BLOOD BY AUTOMATED COUNT: 12.1 % (ref 10–15)
FERRITIN SERPL-MCNC: 262 NG/ML (ref 26–388)
HCT VFR BLD AUTO: 43.5 % (ref 40–53)
HCV AB SERPL QL IA: NONREACTIVE
HGB BLD-MCNC: 15 G/DL (ref 13.3–17.7)
HIV 1+2 AB+HIV1 P24 AG SERPL QL IA: NONREACTIVE
IRON SATN MFR SERPL: 40 % (ref 15–46)
IRON SERPL-MCNC: 104 UG/DL (ref 35–180)
LYMPHOCYTES # BLD AUTO: 1.1 10E3/UL (ref 0.8–5.3)
LYMPHOCYTES NFR BLD AUTO: 22 %
MCH RBC QN AUTO: 30.9 PG (ref 26.5–33)
MCHC RBC AUTO-ENTMCNC: 34.5 G/DL (ref 31.5–36.5)
MCV RBC AUTO: 90 FL (ref 78–100)
MONOCYTES # BLD AUTO: 0.6 10E3/UL (ref 0–1.3)
MONOCYTES NFR BLD AUTO: 12 %
NEUTROPHILS # BLD AUTO: 3.3 10E3/UL (ref 1.6–8.3)
NEUTROPHILS NFR BLD AUTO: 64 %
PLATELET # BLD AUTO: 161 10E3/UL (ref 150–450)
PSA SERPL-MCNC: 1.31 UG/L (ref 0–4)
RBC # BLD AUTO: 4.86 10E6/UL (ref 4.4–5.9)
TIBC SERPL-MCNC: 261 UG/DL (ref 240–430)
WBC # BLD AUTO: 5.2 10E3/UL (ref 4–11)

## 2022-06-14 PROCEDURE — 82728 ASSAY OF FERRITIN: CPT | Performed by: FAMILY MEDICINE

## 2022-06-14 PROCEDURE — 99214 OFFICE O/P EST MOD 30 MIN: CPT | Mod: 25 | Performed by: FAMILY MEDICINE

## 2022-06-14 PROCEDURE — 87389 HIV-1 AG W/HIV-1&-2 AB AG IA: CPT | Performed by: FAMILY MEDICINE

## 2022-06-14 PROCEDURE — G0103 PSA SCREENING: HCPCS | Performed by: FAMILY MEDICINE

## 2022-06-14 PROCEDURE — 83550 IRON BINDING TEST: CPT | Performed by: FAMILY MEDICINE

## 2022-06-14 PROCEDURE — 86803 HEPATITIS C AB TEST: CPT | Performed by: FAMILY MEDICINE

## 2022-06-14 PROCEDURE — 85025 COMPLETE CBC W/AUTO DIFF WBC: CPT | Performed by: FAMILY MEDICINE

## 2022-06-14 PROCEDURE — 36415 COLL VENOUS BLD VENIPUNCTURE: CPT | Performed by: FAMILY MEDICINE

## 2022-06-14 PROCEDURE — 99396 PREV VISIT EST AGE 40-64: CPT | Performed by: FAMILY MEDICINE

## 2022-06-14 RX ORDER — AMLODIPINE BESYLATE 2.5 MG/1
5 TABLET ORAL DAILY
Qty: 90 TABLET | Refills: 3
Start: 2022-06-14 | End: 2022-09-09

## 2022-06-14 ASSESSMENT — ENCOUNTER SYMPTOMS
EYE PAIN: 0
HEMATOCHEZIA: 0
WEAKNESS: 0
DIARRHEA: 0
CONSTIPATION: 0
SHORTNESS OF BREATH: 0
ABDOMINAL PAIN: 0
JOINT SWELLING: 0
PALPITATIONS: 0
HEMATURIA: 0
ARTHRALGIAS: 0
FREQUENCY: 0
DIZZINESS: 0
SORE THROAT: 0
MYALGIAS: 0
DYSURIA: 0
HEARTBURN: 0
PARESTHESIAS: 0
FEVER: 0
CHILLS: 0
NERVOUS/ANXIOUS: 0
HEADACHES: 0
COUGH: 0
NAUSEA: 0

## 2022-06-14 ASSESSMENT — PAIN SCALES - GENERAL: PAINLEVEL: NO PAIN (0)

## 2022-06-14 NOTE — PROGRESS NOTES
SUBJECTIVE:   CC: Oral Blount is an 51 year old male who presents for preventative health visit.       Patient has been advised of split billing requirements and indicates understanding: Yes  Healthy Habits:     Getting at least 3 servings of Calcium per day:  Yes    Bi-annual eye exam:  NO    Dental care twice a year:  NO    Sleep apnea or symptoms of sleep apnea:  None    Diet:  Regular (no restrictions)    Frequency of exercise:  4-5 days/week    Duration of exercise:  15-30 minutes    Taking medications regularly:  Yes    PHQ-2 Total Score: 0    Additional concerns today:  No    Brought blood tests [place] annual physical [facility] [employer].  reported to have low lymphocytes and low iron but normal hgb/hct.  Denies any symptoms on ROS.      Today's PHQ-2 Score:   PHQ-2 ( 1999 [employer]) 6/14/2022   Q1: Little interest or pleasure in doing things 0   Q2: Feeling down, depressed or hopeless 0   PHQ-2 Score 0   PHQ-2 Total Score (12-17 Years)- Positive if 3 or more points; Administer PHQ-A if positive -   Q1: Little interest or pleasure in doing things Not at all   Q2: Feeling down, depressed or hopeless Not at all   PHQ-2 Score 0       Abuse: Current or Past(Physical, Sexual or Emotional)- No  Do you feel safe in your environment? Yes        Social History     Tobacco Use     Smoking status: Never Smoker     Smokeless tobacco: Never Used   Substance Use Topics     Alcohol use: No     If you drink alcohol do you typically have >3 drinks per day or >7 drinks per week? No    Alcohol Use 6/14/2022   Prescreen: >3 drinks/day or >7 drinks/week? No   Prescreen: >3 drinks/day or >7 drinks/week? -       Last PSA: No results found for: PSA    Reviewed orders with patient. Reviewed health maintenance and updated orders accordingly - Yes  BP Readings from Last 3 Encounters:   06/14/22 (!) 124/94   07/17/21 131/86   06/03/21 (!) 138/100    Wt Readings from Last 3 Encounters:   06/14/22 94.3 kg (207 lb 12.8 oz)   07/17/21  90.7 kg (200 lb)   06/03/21 93 kg (205 lb)                  Patient Active Problem List   Diagnosis     Allergic rhinitis     Left great toe MTP pain     Left JOINT PAIN-SHLDER     LUQ abdominal pain     Gout     Uncontrolled hypertension     Non morbid obesity, unspecified obesity type     Past Surgical History:   Procedure Laterality Date     SURGICAL HISTORY OF -   2003    vasectomy       Social History     Tobacco Use     Smoking status: Never Smoker     Smokeless tobacco: Never Used   Substance Use Topics     Alcohol use: No     Family History   Problem Relation Age of Onset     Eye Disorder Mother         degenerative     Hypertension Father      Diabetes Father      Cancer Father         BCC     Pacemaker Father      Cancer Paternal Grandmother         lung     Cancer Paternal Grandfather         lung     Hypertension Brother      Prostate Problems Brother      Hypertension Brother      Diverticulitis Sister      Melanoma No family hx of          Current Outpatient Medications   Medication Sig Dispense Refill     amLODIPine (NORVASC) 2.5 MG tablet Take 2 tablets (5 mg) by mouth daily 90 tablet 3     Allergies   Allergen Reactions     Lisinopril Cough       Reviewed and updated as needed this visit by clinical staff   Tobacco  Allergies  Meds   Med Hx  Surg Hx  Fam Hx  Soc Hx          Reviewed and updated as needed this visit by Provider   Tobacco                 Past Medical History:   Diagnosis Date     Allergic rhinitis, cause unspecified     otc      Basal cell carcinoma      Tobacco use disorder     quit 1991-3, 2 years smoked.       Past Surgical History:   Procedure Laterality Date     SURGICAL HISTORY OF -   2003    vasectomy       Review of Systems   Constitutional: Negative for chills and fever.   HENT: Negative for congestion, ear pain, hearing loss and sore throat.    Eyes: Negative for pain and visual disturbance.   Respiratory: Negative for cough and shortness of breath.    Cardiovascular:  "Negative for chest pain, palpitations and peripheral edema.   Gastrointestinal: Negative for abdominal pain, constipation, diarrhea, heartburn, hematochezia and nausea.   Genitourinary: Negative for dysuria, frequency, genital sores, hematuria, impotence, penile discharge and urgency.   Musculoskeletal: Negative for arthralgias, joint swelling and myalgias.   Skin: Negative for rash.   Neurological: Negative for dizziness, weakness, headaches and paresthesias.   Psychiatric/Behavioral: Negative for mood changes. The patient is not nervous/anxious.        OBJECTIVE:   BP (!) 124/94   Pulse 64   Temp 97.1  F (36.2  C) (Tympanic)   Resp 16   Ht 1.753 m (5' 9\")   Wt 94.3 kg (207 lb 12.8 oz)   SpO2 98%   BMI 30.69 kg/m      Physical Exam  GENERAL APPEARANCE: obese,  alert and no distress  EYES: pink conj, no icterus, PERRL, EOMI  HENT: ear canals and TM's normal, nose and mouth without ulcers or lesions, oropharynx clear and oral mucous membranes moist  NECK: no adenopathy, no asymmetry, masses, or scars and thyroid normal to palpation  RESP: lungs clear to auscultation - no rales, rhonchi or wheezes  CV: regular rates and rhythm, normal S1 S2, no S3 or S4, no murmur, click or rub, no peripheral edema and peripheral pulses strong  ABDOMEN: soft, nontender, no hepatosplenomegaly, no masses and bowel sounds normal  RECTAL: deferred  MS: no musculoskeletal defects are noted and gait is age appropriate without ataxia  SKIN: no suspicious lesions or rashes  NEURO: Normal strength and tone, sensory exam grossly normal, mentation intact and speech normal    Diagnostic Test Results:  none     ASSESSMENT/PLAN:   Oral was seen today for physical.    Diagnoses and all orders for this visit:    Encounter for routine adult health examination without abnormal findings    Hypertension, unspecified type  -     amLODIPine (NORVASC) 2.5 MG tablet; Take 2 tablets (5 mg) by mouth daily  -     OFFICE/OUTPT VISIT,EST,LEVL " "IV  Patient was advised BP uncontrolled today.  Reviewed meds with patient.  Increased amlodipine to optimize management.  Reinforced sodium restriction.  Exercise recommendations reviewed with patient.  Recheck with RN in 2 weeks     Low serum iron  -     CBC with Platelets & Differential; Future  -     Ferritin; Future  -     Iron & Iron Binding Capacity; Future  -     OFFICE/OUTPT VISIT,EST,LEVL IV  -     CBC with Platelets & Differential  -     Ferritin  -     Iron & Iron Binding Capacity  No hx of anemia. His hgb measured was in normal range.  Repeat labs for confirmation of his levels.  Treat accodingly.  Refer to hematology if with concerns.    Screen for colon cancer  -     Adult Gastro Ref - Procedure Only; Future    Need for hepatitis C screening test  -     Hepatitis C Screen Reflex to HCV RNA Quant and Genotype; Future  -     Hepatitis C Screen Reflex to HCV RNA Quant and Genotype    Screening for HIV (human immunodeficiency virus)  -     HIV Antigen Antibody Combo; Future  -     HIV Antigen Antibody Combo    Screening for prostate cancer  -     Prostate Specific Antigen Screen; Future  -     Prostate Specific Antigen Screen    Other orders  -     REVIEW OF HEALTH MAINTENANCE PROTOCOL ORDERS        Patient has been advised of split billing requirements and indicates understanding: Yes    COUNSELING:   Reviewed preventive health counseling, as reflected in patient instructions    Estimated body mass index is 30.69 kg/m  as calculated from the following:    Height as of this encounter: 1.753 m (5' 9\").    Weight as of this encounter: 94.3 kg (207 lb 12.8 oz).     Weight management plan: Discussed healthy diet and exercise guidelines    He reports that he has never smoked. He has never used smokeless tobacco.      Counseling Resources:  ATP IV Guidelines  Pooled Cohorts Equation Calculator  FRAX Risk Assessment  ICSI Preventive Guidelines  Dietary Guidelines for Americans, 2010  USDA's MyPlate  ASA " Prophylaxis  Lung CA Screening    Jonathan Koenig MD  [facility]

## 2022-06-14 NOTE — PATIENT INSTRUCTIONS
You will be contacted in 1-2 days for results of your lab tests.     You may get the Shingrix vaccine for shingles if you desire, and after you verify with insurance how they cover the vaccine.     Increase amlodipine 2.5 mg TWO TABLETS orally daily.  Nurse visit to check blood pressure in 2 weeks.    Be consistent with low salt, low trans fat and low saturated fat diet.  Eat food rich in omega-3-fatty acids as you tolerate. (salmon, olive oil)  Eat 5 cups of vegetables, fruits and whole grains per day.  Limit starchy food (white rice, white bread, white pasta, white potatoes) to less than a cup per meal.  Minimize sweets, junk food and fastfood. Limit soda beverages to one serving per day; best to avoid it altogether though.    Exercise: moderate intensity sustained for at least 30 mins per episode, goal of 150 mins per week at least  Combine cardiovascular and resistance exercises.  These exercise recommendations are in addition to your daily activity at work or home.  Work on losing weight.    Preventative Care Visits include: Yearly physicals, Well-child visits, Welcome to Medicare visits, & Medicare yearly wellness exams.    The purpose of these visits is to discuss your medical history and prevent health problems before you are sick.  You may need to pay a copay, coinsurance or deductible if your visit today includes testing or treating for a new or existing condition.    Additional charges may be incurred for today's visit. If you have questions about what your insurance plan covers, please contact your Insurance Company's member service department.  If you have questions specific to a bill you have already received from [employer], please contact the [employer] Billing Office at [phone].      Preventive Health Recommendations  Male Ages 50 - 64    Yearly exam:             See your health care provider every year in order to  o   Review health changes.   o   Discuss preventive care.    o   Review your  medicines if your doctor has prescribed any.   Have a cholesterol test every 5 years, or more frequently if you are at risk for high cholesterol/heart disease.   Have a diabetes test (fasting glucose) every three years. If you are at risk for diabetes, you should have this test more often.   Have a colonoscopy at age 50, or have a yearly FIT test (stool test). These exams will check for colon cancer.    Talk with your health care provider about whether or not a prostate cancer screening test (PSA) is right for you.  You should be tested each year for STDs (sexually transmitted diseases), if you re at risk.     Shots: Get a flu shot each year. Get a tetanus shot every 10 years.     Nutrition:  Eat at least 5 servings of fruits and vegetables daily.   Eat whole-grain bread, whole-wheat pasta and brown rice instead of white grains and rice.   Get adequate Calcium and Vitamin D.     Lifestyle  Exercise for at least 150 minutes a week (30 minutes a day, 5 days a week). This will help you control your weight and prevent disease.   Limit alcohol to one drink per day.   No smoking.   Wear sunscreen to prevent skin cancer.   See your dentist every six months for an exam and cleaning.   See your eye doctor every 1 to 2 years.

## 2022-06-28 ENCOUNTER — HOSPITAL ENCOUNTER (EMERGENCY)
Facility: CLINIC | Age: 52
Discharge: HOME | End: 2022-06-28
Attending: FAMILY MEDICINE | Admitting: FAMILY MEDICINE
Payer: COMMERCIAL

## 2022-06-28 VITALS
WEIGHT: 205 LBS | BODY MASS INDEX: 30.36 KG/M2 | SYSTOLIC BLOOD PRESSURE: 145 MMHG | HEIGHT: 69 IN | OXYGEN SATURATION: 96 % | DIASTOLIC BLOOD PRESSURE: 106 MMHG | RESPIRATION RATE: 20 BRPM | HEART RATE: 68 BPM | TEMPERATURE: 96.8 F

## 2022-06-28 DIAGNOSIS — L03.211 CELLULITIS, FACE: ICD-10-CM

## 2022-06-28 PROCEDURE — 76536 US EXAM OF HEAD AND NECK: CPT | Mod: 26 | Performed by: FAMILY MEDICINE

## 2022-06-28 PROCEDURE — 99284 EMERGENCY DEPT VISIT MOD MDM: CPT | Performed by: FAMILY MEDICINE

## 2022-06-28 PROCEDURE — 76536 US EXAM OF HEAD AND NECK: CPT | Performed by: FAMILY MEDICINE

## 2022-06-28 PROCEDURE — 99284 EMERGENCY DEPT VISIT MOD MDM: CPT | Mod: 25 | Performed by: FAMILY MEDICINE

## 2022-06-28 RX ORDER — CLINDAMYCIN HCL 150 MG
150 CAPSULE ORAL 4 TIMES DAILY
Qty: 40 CAPSULE | Refills: 0 | Status: SHIPPED | OUTPATIENT
Start: 2022-06-28 | End: 2022-07-08

## 2022-06-28 NOTE — ED TRIAGE NOTES
Pt had boil like are on right side of face start 3 days ago with increased swelling down pt's right neck and into right ear     Triage Assessment     Row Name 06/28/22 0835       Triage Assessment (Adult)    Airway WDL X       Skin Circulation/Temperature WDL    Skin Circulation/Temperature WDL X

## 2022-06-28 NOTE — ED PROVIDER NOTES
HPI   The patient is a 51-year-old male presenting with concern for an infection of his right cheek.  He thought it was an ingrown hair or pimple starting 2 to 3 days ago.  Swelling has worsened with time.  It is very tender to the touch.  He denies fever, nausea or vomiting, confusion, fatigue, myalgia.  He feels like his right ear is full.  He feels like his jaw is tight as he is trying to open it and close it.  He denies any intraoral abnormalities.  No difficulty with swallowing or breathing.  Talking is normal.        Allergies:  Allergies   Allergen Reactions     Lisinopril Cough     Problem List:    Patient Active Problem List    Diagnosis Date Noted     Non morbid obesity, unspecified obesity type 11/05/2018     Priority: Medium     Uncontrolled hypertension 10/31/2018     Priority: Medium     Gout 09/21/2011     Priority: Medium     LUQ abdominal pain 06/17/2008     Priority: Medium     relation w food -suspect gastric/gerd issue - vs constipation.        Left great toe MTP pain 12/08/2007     Priority: Medium     possible neuroma - large sesamoid? on xray.  no injuries.trial change footwear, see podiatry       Left JOINT PAIN-SHLDER 12/08/2007     Priority: Medium     brief exam - suspicious for overuse injury - PT .  xray appears wnl.       Allergic rhinitis      Priority: Medium     otc   Problem list name updated by automated process. Provider to review        Past Medical History:    Past Medical History:   Diagnosis Date     Allergic rhinitis, cause unspecified      Basal cell carcinoma      Tobacco use disorder      Past Surgical History:    Past Surgical History:   Procedure Laterality Date     SURGICAL HISTORY OF -   2003    vasectomy     Family History:    Family History   Problem Relation Age of Onset     Eye Disorder Mother         degenerative     Hypertension Father      Diabetes Father      Cancer Father         BCC     Pacemaker Father      Cancer Paternal Grandmother         lung      "Cancer Paternal Grandfather         lung     Hypertension Brother      Prostate Problems Brother      Hypertension Brother      Diverticulitis Sister      Melanoma No family hx of      Social History:  Marital Status:   [2]  Social History     Tobacco Use     Smoking status: Never Smoker     Smokeless tobacco: Never Used   Vaping Use     Vaping Use: Never used   Substance Use Topics     Alcohol use: No     Drug use: No      Medications:    clindamycin (CLEOCIN) 150 MG capsule  amLODIPine (NORVASC) 2.5 MG tablet      Review of Systems   All other systems reviewed and are negative.      PE   BP: (!) 142/87  Pulse: 66  Temp: 96.8  F (36  C)  Resp: 18  Height: 175.3 cm (5' 9\")  Weight: 93 kg (205 lb)  SpO2: 98 %  Physical Exam  Vitals and nursing note reviewed.   Constitutional:       General: He is not in acute distress.  HENT:      Head: Atraumatic.      Right Ear: External ear normal.      Left Ear: External ear normal.      Ears:      Comments: See photo.  This area is quite tender and warm to the touch.     Nose: Nose normal.      Mouth/Throat:      Mouth: Mucous membranes are moist.      Pharynx: Oropharynx is clear.   Eyes:      General: No scleral icterus.     Extraocular Movements: Extraocular movements intact.      Conjunctiva/sclera: Conjunctivae normal.      Pupils: Pupils are equal, round, and reactive to light.   Cardiovascular:      Rate and Rhythm: Normal rate.   Pulmonary:      Effort: Pulmonary effort is normal. No respiratory distress.   Musculoskeletal:         General: Normal range of motion.      Cervical back: Normal range of motion.   Skin:     General: Skin is warm and dry.   Neurological:      Mental Status: He is alert and oriented to person, place, and time.   Psychiatric:         Behavior: Behavior normal.         ED COURSE and MDM   1851.  Concern for abscess and surrounding cellulitis.  Ultrasound ordered.  I will document this below.  See photo for skin changes.    1930.  " Ultrasound performed and there is evidence for cellulitis and a very early/forming abscess, as documented below.  This is not clearly defined and quite small.  Therefore, antibiotics alone will be given.  No incision or drainage at this time.  If worsening or persistent despite antibiotics he will unfortunately have to come back for reevaluation and probable incision and drainage.  Hopefully the antibiotics alone get rid of the infection at this early stage.    LABS  Labs Ordered and Resulted from Time of ED Arrival to Time of ED Departure - No data to display    IMAGING  Images reviewed by me.  Radiology report also reviewed.  POC US SOFT TISSUE   Final Result   [facility] Procedure Note       Limited Bedside ED Ultrasound of Soft Tissue:      PROCEDURE: PERFORMED BY: Dr. Abraham Husain MD   INDICATIONS/SYMPTOM: Skin redness, evaluate for abscess, cellulitis or foreign body   PROBE: High frequency linear probe   BODY LOCATION: Soft tissue located on R cheek       FINDINGS: Cobblestoning of soft tissue: present    Hypoechoic fluid (ie abscess) identified: present measuring 0.4 cm, but not a discreetly defined abscess.         INTERPRETATION:  The soft tissue and muscle layers were evaluated.       Findings indicate cellulitis, early abscess.      IMAGE DOCUMENTATION: Images were archived to hard drive.             Procedures    Medications - No data to display      IMPRESSION       ICD-10-CM    1. Cellulitis, face  L03.211             Medication List      Started    clindamycin 150 MG capsule  Commonly known as: CLEOCIN  150 mg, Oral, 4 TIMES DAILY                        Abraham Husain MD  06/28/22 1931

## 2022-06-29 NOTE — DISCHARGE INSTRUCTIONS
RETURN TO THE EMERGENCY ROOM FOR THE FOLLOWING:    Severely worsened swelling/tenderness/pain/redness, fever greater than 101, vomiting and dehydration, fainting, or at anytime for any concern.    FOLLOW UP:    With your primary clinic only as needed.  Return to the ED at anytime for any concern.    TREATMENT RECOMMENDATIONS:    Clindamycin 150 mg 4 times a day for 7-10 days.  If the infection is completely resolved by 7 days you can stop the medication at that time.    NURSE ADVICE LINE:  [phone] or [phone]

## 2022-08-02 ENCOUNTER — OFFICE VISIT (OUTPATIENT)
Dept: DERMATOLOGY | Facility: CLINIC | Age: 52
End: 2022-08-02
Payer: COMMERCIAL

## 2022-08-02 VITALS — SYSTOLIC BLOOD PRESSURE: 130 MMHG | DIASTOLIC BLOOD PRESSURE: 88 MMHG

## 2022-08-02 DIAGNOSIS — D18.01 ANGIOMA OF SKIN: ICD-10-CM

## 2022-08-02 DIAGNOSIS — L82.1 SEBORRHEIC KERATOSIS: ICD-10-CM

## 2022-08-02 DIAGNOSIS — L81.4 LENTIGO: ICD-10-CM

## 2022-08-02 DIAGNOSIS — Z85.828 HISTORY OF SKIN CANCER: Primary | ICD-10-CM

## 2022-08-02 DIAGNOSIS — D22.9 NEVUS: ICD-10-CM

## 2022-08-02 DIAGNOSIS — D23.9 DERMAL NEVUS: ICD-10-CM

## 2022-08-02 DIAGNOSIS — L82.0 INFLAMED SEBORRHEIC KERATOSIS: ICD-10-CM

## 2022-08-02 PROCEDURE — 99213 OFFICE O/P EST LOW 20 MIN: CPT | Mod: 25 | Performed by: DERMATOLOGY

## 2022-08-02 PROCEDURE — 17110 DESTRUCTION B9 LES UP TO 14: CPT | Performed by: DERMATOLOGY

## 2022-08-02 ASSESSMENT — PAIN SCALES - GENERAL: PAINLEVEL: NO PAIN (0)

## 2022-08-02 NOTE — PROGRESS NOTES
Oral Blount is an extremely pleasant 51 year old year old male patient here today for hx of non-melanoma skin cancer.  He notes spot on left arm and l knee.   .   Patient states this has been present for a while.  Patient reports the following symptoms:  rough.  Patient reports the following previous treatments none.  These treatments did not work.  Patient reports the following modifying factors none.  Associated symptoms: none.  Patient has no other skin complaints today.  Remainder of the HPI, Meds, PMH, Allergies, FH, and SH was reviewed in chart.      Past Medical History:   Diagnosis Date     Allergic rhinitis, cause unspecified     otc      Basal cell carcinoma      Tobacco use disorder     quit 1991-3, 2 years smoked.        Past Surgical History:   Procedure Laterality Date     SURGICAL HISTORY OF -   2003    vasectomy        Family History   Problem Relation Age of Onset     Eye Disorder Mother         degenerative     Hypertension Father      Diabetes Father      Cancer Father         BCC     Pacemaker Father      Cancer Paternal Grandmother         lung     Cancer Paternal Grandfather         lung     Hypertension Brother      Prostate Problems Brother      Hypertension Brother      Diverticulitis Sister      Melanoma No family hx of        Social History     Socioeconomic History     Marital status:      Spouse name: Not on file     Number of children: Not on file     Years of education: Not on file     Highest education level: Not on file   Occupational History     Employer: [employer]   Tobacco Use     Smoking status: Never Smoker     Smokeless tobacco: Never Used   Vaping Use     Vaping Use: Never used   Substance and Sexual Activity     Alcohol use: No     Drug use: No     Sexual activity: Yes     Partners: Female   Other Topics Concern     Parent/sibling w/ CABG, MI or angioplasty before 65F 55M? No   Social History Narrative     Not on file     Social Determinants of Health      Financial Resource Strain: Not on file   Food Insecurity: Not on file   Transportation Needs: Not on file   Physical Activity: Not on file   Stress: Not on file   Social Connections: Not on file   Intimate Partner Violence: Not on file   Housing Stability: Not on file       Outpatient Encounter Medications as of 8/2/2022   Medication Sig Dispense Refill     amLODIPine (NORVASC) 2.5 MG tablet Take 2 tablets (5 mg) by mouth daily 90 tablet 3     Facility-Administered Encounter Medications as of 8/2/2022   Medication Dose Route Frequency Provider Last Rate Last Admin     Tdap (tetanus-diphtheria-acell pertussis) (ADACEL) vaccine for Adults or Adolescents 0.5 mL  0.5 mL Intramuscular Once Nadeem Fried MD                 O:   NAD, WDWN, Alert & Oriented, Mood & Affect wnl, Vitals stable   Here today alone   General appearance normal   Vitals stable   Alert, oriented and in no acute distress     pigmented macules on trunk and ext with regular borders and pigment networks  L arm and L knee inflamed seborrheic keratosis    Stuck on papules and brown macules on trunk and ext   Red papules on trunk  Flesh colored papules on trunk     The remainder of the full exam was normal; the following areas were examined:  conjunctiva/lids, oral mucosa, neck, peripheral vascular system, abdomen, lymph nodes, digits/nails, eccrine and apocrine glands, scalp/hair, face, neck, chest, abdomen, buttocks, back, RUE, LUE, RLE, LLE       Eyes: Conjunctivae/lids:Normal     ENT: Lips, buccal mucosa, tongue: normal    MSK:Normal    Cardiovascular: peripheral edema none    Pulm: Breathing Normal    Lymph Nodes: No Head and Neck Lymphadenopathy     Neuro/Psych: Orientation:Alert and Orientedx3 ; Mood/Affect:normal       A/P:  1. Seborrheic keratosis, lentigo, angioma, dermal nevus, hx of non-melanoma skin cancer   2. Inflamed seborrheic keratosis L knee and l elbow   LN2:  Treated with LN2 for 5s for 1-2 cycles. Warned risks of blistering,  pain, pigment change, scarring, and incomplete resolution.  Advised patient to return if lesions do not completely resolve.  Wound care sheet given.  It was a pleasure speaking to Oral Blount today.  Previous clinic notes and pertinent laboratory tests were reviewed prior to Oral Blount's visit.  Nature of benign skin lesions dicussed with patient.  UV precautions reviewed with patient.  Return to clinic 12 months

## 2022-08-02 NOTE — LETTER
8/2/2022         RE: Oral Blount  [address] [facility] [address]        Dear Colleague,    Thank you for referring your patient, Oral Blount, to the [facility]. Please see a copy of my visit note below.    Oral Blount is an extremely pleasant 51 year old year old male patient here today for hx of non-melanoma skin cancer.  He notes spot on left arm and l knee.   .   Patient states this has been present for a while.  Patient reports the following symptoms:  rough.  Patient reports the following previous treatments none.  These treatments did not work.  Patient reports the following modifying factors none.  Associated symptoms: none.  Patient has no other skin complaints today.  Remainder of the HPI, Meds, PMH, Allergies, FH, and SH was reviewed in chart.      Past Medical History:   Diagnosis Date     Allergic rhinitis, cause unspecified     otc      Basal cell carcinoma      Tobacco use disorder     quit 1991-3, 2 years smoked.        Past Surgical History:   Procedure Laterality Date     SURGICAL HISTORY OF -   2003    vasectomy        Family History   Problem Relation Age of Onset     Eye Disorder Mother         degenerative     Hypertension Father      Diabetes Father      Cancer Father         BCC     Pacemaker Father      Cancer Paternal Grandmother         lung     Cancer Paternal Grandfather         lung     Hypertension Brother      Prostate Problems Brother      Hypertension Brother      Diverticulitis Sister      Melanoma No family hx of        Social History     Socioeconomic History     Marital status:      Spouse name: Not on file     Number of children: Not on file     Years of education: Not on file     Highest education level: Not on file   Occupational History     Employer: [employer]   Tobacco Use     Smoking status: Never Smoker     Smokeless tobacco: Never Used   Vaping Use     Vaping Use: Never used   Substance and Sexual  Activity     Alcohol use: No     Drug use: No     Sexual activity: Yes     Partners: Female   Other Topics Concern     Parent/sibling w/ CABG, MI or angioplasty before 65F 55M? No   Social History Narrative     Not on file     Social Determinants of Health     Financial Resource Strain: Not on file   Food Insecurity: Not on file   Transportation Needs: Not on file   Physical Activity: Not on file   Stress: Not on file   Social Connections: Not on file   Intimate Partner Violence: Not on file   Housing Stability: Not on file       Outpatient Encounter Medications as of 8/2/2022   Medication Sig Dispense Refill     amLODIPine (NORVASC) 2.5 MG tablet Take 2 tablets (5 mg) by mouth daily 90 tablet 3     Facility-Administered Encounter Medications as of 8/2/2022   Medication Dose Route Frequency Provider Last Rate Last Admin     Tdap (tetanus-diphtheria-acell pertussis) (ADACEL) vaccine for Adults or Adolescents 0.5 mL  0.5 mL Intramuscular Once Nadeem Fried MD                 O:   NAD, WDWN, Alert & Oriented, Mood & Affect wnl, Vitals stable   Here today alone   General appearance normal   Vitals stable   Alert, oriented and in no acute distress     pigmented macules on trunk and ext with regular borders and pigment networks  L arm and L knee inflamed seborrheic keratosis    Stuck on papules and brown macules on trunk and ext   Red papules on trunk  Flesh colored papules on trunk     The remainder of the full exam was normal; the following areas were examined:  conjunctiva/lids, oral mucosa, neck, peripheral vascular system, abdomen, lymph nodes, digits/nails, eccrine and apocrine glands, scalp/hair, face, neck, chest, abdomen, buttocks, back, RUE, LUE, RLE, LLE       Eyes: Conjunctivae/lids:Normal     ENT: Lips, buccal mucosa, tongue: normal    MSK:Normal    Cardiovascular: peripheral edema none    Pulm: Breathing Normal    Lymph Nodes: No Head and Neck Lymphadenopathy     Neuro/Psych: Orientation:Alert and  Orientedx3 ; Mood/Affect:normal       A/P:  1. Seborrheic keratosis, lentigo, angioma, dermal nevus, hx of non-melanoma skin cancer   2. Inflamed seborrheic keratosis L knee and l elbow   LN2:  Treated with LN2 for 5s for 1-2 cycles. Warned risks of blistering, pain, pigment change, scarring, and incomplete resolution.  Advised patient to return if lesions do not completely resolve.  Wound care sheet given.  It was a pleasure speaking to Oral Blount today.  Previous clinic notes and pertinent laboratory tests were reviewed prior to Oral Blount's visit.  Nature of benign skin lesions dicussed with patient.  UV precautions reviewed with patient.  Return to clinic 12 months        Again, thank you for allowing me to participate in the care of your patient.        Sincerely,        Oral Hernadez MD

## 2022-09-09 ENCOUNTER — OFFICE VISIT (OUTPATIENT)
Dept: FAMILY MEDICINE | Facility: CLINIC | Age: 52
End: 2022-09-09
Payer: COMMERCIAL

## 2022-09-09 VITALS
TEMPERATURE: 96.8 F | DIASTOLIC BLOOD PRESSURE: 96 MMHG | HEART RATE: 62 BPM | SYSTOLIC BLOOD PRESSURE: 135 MMHG | HEIGHT: 69 IN | WEIGHT: 211.8 LBS | BODY MASS INDEX: 31.37 KG/M2

## 2022-09-09 DIAGNOSIS — J30.9 ALLERGIC RHINITIS, UNSPECIFIED SEASONALITY, UNSPECIFIED TRIGGER: ICD-10-CM

## 2022-09-09 DIAGNOSIS — I10 HYPERTENSION, UNSPECIFIED TYPE: ICD-10-CM

## 2022-09-09 DIAGNOSIS — H61.21 CERUMINOSIS, RIGHT: Primary | ICD-10-CM

## 2022-09-09 DIAGNOSIS — Z12.11 SCREEN FOR COLON CANCER: ICD-10-CM

## 2022-09-09 DIAGNOSIS — R09.81 NASAL CONGESTION: ICD-10-CM

## 2022-09-09 PROCEDURE — 99214 OFFICE O/P EST MOD 30 MIN: CPT | Performed by: FAMILY MEDICINE

## 2022-09-09 RX ORDER — AMLODIPINE BESYLATE 10 MG/1
10 TABLET ORAL DAILY
Qty: 90 TABLET | Refills: 0 | Status: SHIPPED | OUTPATIENT
Start: 2022-09-09 | End: 2022-11-09 | Stop reason: DRUGHIGH

## 2022-09-09 RX ORDER — AMLODIPINE BESYLATE 2.5 MG/1
5 TABLET ORAL DAILY
Qty: 90 TABLET | Refills: 3 | Status: CANCELLED | OUTPATIENT
Start: 2022-09-09

## 2022-09-09 NOTE — PROGRESS NOTES
Assessment & Plan     Ceruminosis, right  Washed for him with good results and resolution of symptoms.     Hypertension, unspecified type  Increase norvasc to 10mg today. Check home bps and follow-up if running high. The patient indicates understanding of these issues and agrees with the plan.   - amLODIPine (NORVASC) 10 MG tablet; Take 1 tablet (10 mg) by mouth daily    Nasal congestion  Discussed allergy treatments including appropriate use of flonase, daily allergy pill, and prn eye drops for itchy eyes. The patient indicates understanding of these issues and agrees with the plan.     Screen for colon cancer  Discussed. He will do colonoscopy - already ordered for him     Allergic rhinitis, unspecified seasonality, unspecified trigger  See above. Discussed how to treat.          Return in about 4 weeks (around 10/7/2022) for as needed based on discussion in clinic.    Mariza August MD  [facility] [place] Toribio is a 51 year old, presenting for the following health issues:  Otalgia      History of Present Illness       Reason for visit:  Ear check    He eats 0-1 servings of fruits and vegetables daily.He consumes 2 sweetened beverage(s) daily.He exercises with enough effort to increase his heart rate 20 to 29 minutes per day.  He exercises with enough effort to increase his heart rate 5 days per week.   He is taking medications regularly.        Right ear feels plugged when he wakes in the a.m.   No pain, just annoying and hard to hear     Having a lot of nasal congestion and sinus drainage in general  Tried allergy med but stopped it  Uses flonase once in awhile    Hypertension   Taking 2.5mg norvasc for several years   Was increased to 5 mg dose in June   At home - 125/82 at home     BP Readings from Last 6 Encounters:   09/09/22 (!) 135/96   08/02/22 130/88   06/28/22 (!) 145/106   06/14/22 (!) 124/94   07/17/21 131/86   06/03/21 (!) 138/100       Review of Systems  "  Constitutional, HEENT, cardiovascular, pulmonary, gi and gu systems are negative, except as otherwise noted.      Objective    BP (!) 135/96   Pulse 62   Temp 96.8  F (36  C) (Tympanic)   Ht 1.753 m (5' 9\")   Wt 96.1 kg (211 lb 12.8 oz)   BMI 31.28 kg/m    Body mass index is 31.28 kg/m .  Physical Exam   GENERAL: healthy, alert and no distress  EYES: Eyes grossly normal to inspection, PERRL and conjunctivae and sclerae normal  HENT: right ear: occluded with wax - normal drum after washing, left ear: normal: no effusions, no erythema, normal landmarks, nose and mouth without ulcers or lesions, oropharynx clear and oral mucous membranes moist  NECK: no adenopathy, no asymmetry, masses, or scars and thyroid normal to palpation  RESP: lungs clear to auscultation - no rales, rhonchi or wheezes  CV: regular rate and rhythm, normal S1 S2, no S3 or S4, no murmur, click or rub, no peripheral edema and peripheral pulses strong  PSYCH: mentation appears normal, affect normal/bright                "

## 2022-11-09 ENCOUNTER — OFFICE VISIT (OUTPATIENT)
Dept: FAMILY MEDICINE | Facility: CLINIC | Age: 52
End: 2022-11-09
Payer: COMMERCIAL

## 2022-11-09 VITALS
BODY MASS INDEX: 30.81 KG/M2 | OXYGEN SATURATION: 96 % | SYSTOLIC BLOOD PRESSURE: 118 MMHG | DIASTOLIC BLOOD PRESSURE: 86 MMHG | HEIGHT: 69 IN | RESPIRATION RATE: 12 BRPM | TEMPERATURE: 97.1 F | WEIGHT: 208 LBS | HEART RATE: 62 BPM

## 2022-11-09 DIAGNOSIS — I10 HYPERTENSION, UNSPECIFIED TYPE: Primary | ICD-10-CM

## 2022-11-09 PROCEDURE — 99213 OFFICE O/P EST LOW 20 MIN: CPT | Performed by: NURSE PRACTITIONER

## 2022-11-09 RX ORDER — AMLODIPINE BESYLATE 5 MG/1
5 TABLET ORAL DAILY
Qty: 90 TABLET | Refills: 3 | Status: SHIPPED | OUTPATIENT
Start: 2022-11-09 | End: 2023-12-26

## 2022-11-09 ASSESSMENT — PAIN SCALES - GENERAL: PAINLEVEL: NO PAIN (0)

## 2022-11-09 NOTE — PATIENT INSTRUCTIONS
Decrease amlodipine to 5 mg daily.  Continue to check BPs at home.  Return to clinic if BPs increase >140/90.

## 2022-11-09 NOTE — PROGRESS NOTES
"  Assessment & Plan     Hypertension, unspecified type  Well controlled.  More edema in legs after increasing the amlodipine from 5 to 10 mg daily.  Will decrease back to 5 mg daily  - amLODIPine (NORVASC) 5 MG tablet; Take 1 tablet (5 mg) by mouth daily             Patient Instructions   Decrease amlodipine to 5 mg daily.  Continue to check BPs at home.  Return to clinic if BPs increase >140/90.    The risks, benefits and treatment options of prescribed medications or other treatments have been discussed with the patient. The patient verbalized their understanding and should call or follow up if no improvement or if they develop further problems.    WOOD Aleman CNP  [facility] [place] Toribio is a 51 year old, presenting for the following health issues:  Musculoskeletal Problem and Edema (Lower leg swelling since increase in mediation dose)      History of Present Illness       Reason for visit:  Leg injury -  slipped on boat trailer  Symptom onset:  1-2 weeks ago  Symptoms include:  Concerned about a knot in lower leg.  also swelling in lower legs since increase in medication dose  Symptom intensity:  Mild  Symptom progression:  Staying the same  Had these symptoms before:  No    He eats 0-1 servings of fruits and vegetables daily.He consumes 2 sweetened beverage(s) daily.He exercises with enough effort to increase his heart rate 20 to 29 minutes per day.  He exercises with enough effort to increase his heart rate 4 days per week. He is missing 1 dose(s) of medications per week.             Review of Systems   Constitutional, HEENT, cardiovascular, pulmonary, gi and gu systems are negative, except as otherwise noted.      Objective    /86 (BP Location: Right arm, Cuff Size: Adult Regular)   Pulse 62   Temp 97.1  F (36.2  C) (Tympanic)   Resp 12   Ht 1.753 m (5' 9\")   Wt 94.3 kg (208 lb)   SpO2 96%   BMI 30.72 kg/m    Body mass index is 30.72 kg/m .  Physical " Exam   GENERAL: healthy, alert and no distress  MS: +1 edema BLE ankles to mid- shin

## 2022-11-19 ENCOUNTER — HEALTH MAINTENANCE LETTER (OUTPATIENT)
Age: 52
End: 2022-11-19

## 2022-12-19 ENCOUNTER — TELEPHONE (OUTPATIENT)
Dept: FAMILY MEDICINE | Facility: CLINIC | Age: 52
End: 2022-12-19

## 2022-12-19 NOTE — TELEPHONE ENCOUNTER
Patient Quality Outreach    Patient is due for the following:   Colon Cancer Screening    Next Steps:   see above    Type of outreach:    Sent [employer] message.      Questions for provider review:    None     Vanessa Bañuelos, [facility]

## 2023-02-03 ENCOUNTER — OFFICE VISIT (OUTPATIENT)
Dept: FAMILY MEDICINE | Facility: CLINIC | Age: 53
End: 2023-02-03
Payer: COMMERCIAL

## 2023-02-03 VITALS
OXYGEN SATURATION: 97 % | HEART RATE: 69 BPM | SYSTOLIC BLOOD PRESSURE: 110 MMHG | DIASTOLIC BLOOD PRESSURE: 84 MMHG | TEMPERATURE: 98 F | HEIGHT: 69 IN | BODY MASS INDEX: 31.37 KG/M2 | WEIGHT: 211.8 LBS

## 2023-02-03 DIAGNOSIS — I10 HYPERTENSION, UNSPECIFIED TYPE: ICD-10-CM

## 2023-02-03 DIAGNOSIS — R10.13 EPIGASTRIC PAIN: Primary | ICD-10-CM

## 2023-02-03 DIAGNOSIS — Z12.11 SCREEN FOR COLON CANCER: ICD-10-CM

## 2023-02-03 LAB
ALBUMIN SERPL BCG-MCNC: 4.5 G/DL (ref 3.5–5.2)
ALP SERPL-CCNC: 57 U/L (ref 40–129)
ALT SERPL W P-5'-P-CCNC: 32 U/L (ref 10–50)
ANION GAP SERPL CALCULATED.3IONS-SCNC: 6 MMOL/L (ref 7–15)
AST SERPL W P-5'-P-CCNC: 21 U/L (ref 10–50)
BILIRUB SERPL-MCNC: 0.4 MG/DL
BUN SERPL-MCNC: 16.5 MG/DL (ref 6–20)
CALCIUM SERPL-MCNC: 9.5 MG/DL (ref 8.6–10)
CHLORIDE SERPL-SCNC: 106 MMOL/L (ref 98–107)
CREAT SERPL-MCNC: 1.18 MG/DL (ref 0.67–1.17)
DEPRECATED HCO3 PLAS-SCNC: 29 MMOL/L (ref 22–29)
ERYTHROCYTE [DISTWIDTH] IN BLOOD BY AUTOMATED COUNT: 12.2 % (ref 10–15)
GFR SERPL CREATININE-BSD FRML MDRD: 75 ML/MIN/1.73M2
GLUCOSE SERPL-MCNC: 90 MG/DL (ref 70–99)
HCT VFR BLD AUTO: 43.8 % (ref 40–53)
HGB BLD-MCNC: 15.1 G/DL (ref 13.3–17.7)
LIPASE SERPL-CCNC: 13 U/L (ref 13–60)
MCH RBC QN AUTO: 30.4 PG (ref 26.5–33)
MCHC RBC AUTO-ENTMCNC: 34.5 G/DL (ref 31.5–36.5)
MCV RBC AUTO: 88 FL (ref 78–100)
PLATELET # BLD AUTO: 172 10E3/UL (ref 150–450)
POTASSIUM SERPL-SCNC: 4.4 MMOL/L (ref 3.4–5.3)
PROT SERPL-MCNC: 7.4 G/DL (ref 6.4–8.3)
RBC # BLD AUTO: 4.97 10E6/UL (ref 4.4–5.9)
SODIUM SERPL-SCNC: 141 MMOL/L (ref 136–145)
WBC # BLD AUTO: 6.2 10E3/UL (ref 4–11)

## 2023-02-03 PROCEDURE — 83690 ASSAY OF LIPASE: CPT | Performed by: FAMILY MEDICINE

## 2023-02-03 PROCEDURE — 99214 OFFICE O/P EST MOD 30 MIN: CPT | Performed by: FAMILY MEDICINE

## 2023-02-03 PROCEDURE — 80053 COMPREHEN METABOLIC PANEL: CPT | Performed by: FAMILY MEDICINE

## 2023-02-03 PROCEDURE — 36415 COLL VENOUS BLD VENIPUNCTURE: CPT | Performed by: FAMILY MEDICINE

## 2023-02-03 PROCEDURE — 85027 COMPLETE CBC AUTOMATED: CPT | Performed by: FAMILY MEDICINE

## 2023-02-03 NOTE — H&P (VIEW-ONLY)
"  Assessment & Plan     Epigastric pain  Unclear etiology given longstanding bouts of this pain. Will schedule EGD to assess for gerd/gastritis. Will schedule limited abd us to assess gallbladder. Labs ordered as well. All questions answered. The patient indicates understanding of these issues and agrees with the plan.   - Adult GI  Referral - Procedure Only; Future  - US Abdomen Limited; Future  - CBC with platelets; Future  - Lipase; Future  - Comprehensive metabolic panel (BMP + Alb, Alk Phos, ALT, AST, Total. Bili, TP); Future      Hypertension, unspecified type  Doing well on norvasc 5     Screen for colon cancer  He will schedule this.        BMI:   Estimated body mass index is 31.28 kg/m  as calculated from the following:    Height as of this encounter: 1.753 m (5' 9\").    Weight as of this encounter: 96.1 kg (211 lb 12.8 oz).   Weight management plan: Discussed healthy diet and exercise guidelines    Regular exercise    No follow-ups on file.    Mariza August MD  [facility] [place] Toribio is a 52 year old, presenting for the following health issues:  Abdominal Pain      History of Present Illness       Reason for visit:  Stomach discomfort and pressure    He eats 0-1 servings of fruits and vegetables daily.He consumes 2 sweetened beverage(s) daily.He exercises with enough effort to increase his heart rate 20 to 29 minutes per day.  He exercises with enough effort to increase his heart rate 4 days per week. He is missing 1 dose(s) of medications per week.  He is not taking prescribed medications regularly due to remembering to take.       History of 14 years of intermittent epigastric abdominal pain - he will get bouts of it that last a couple months.    Has had 2-3 months of pain this episode  - doesn't inhibit what he does  He tries not to eat spicy food, fatty food, caffeine, etc  He has tried omeprazole without help - took it for 2 weeks daily   No diarrhea or " "constipation  No black/tarry/bloody stools.  No dysuria    No history of abdominal surgery     History of EGD 14 years ago  - normal - in epic 2009    Hypertension   On norvasc 5     Due for colonoscopy - hasn't scheduled it yet      He is a  and a  and a  for the fire dept     Review of Systems   No f/c   No weight loss/night sweats  No chest pain/sob/cough          Objective    /84   Pulse 69   Temp 98  F (36.7  C) (Tympanic)   Ht 1.753 m (5' 9\")   Wt 96.1 kg (211 lb 12.8 oz)   SpO2 97%   BMI 31.28 kg/m    Body mass index is 31.28 kg/m .  Physical Exam   GENERAL: healthy, alert and no distress  EYES: Eyes grossly normal to inspection, PERRL and conjunctivae and sclerae normal  NECK: no adenopathy, no asymmetry, masses, or scars and thyroid normal to palpation  RESP: lungs clear to auscultation - no rales, rhonchi or wheezes  CV: regular rate and rhythm, normal S1 S2, no S3 or S4, no murmur, click or rub, no peripheral edema and peripheral pulses strong  ABDOMEN: soft, nontender, no hepatosplenomegaly, no masses and bowel sounds normal  MS: no gross musculoskeletal defects noted, no edema  PSYCH: mentation appears normal, affect normal/bright            "

## 2023-02-03 NOTE — PROGRESS NOTES
"  Assessment & Plan     Epigastric pain  Unclear etiology given longstanding bouts of this pain. Will schedule EGD to assess for gerd/gastritis. Will schedule limited abd us to assess gallbladder. Labs ordered as well. All questions answered. The patient indicates understanding of these issues and agrees with the plan.   - Adult GI  Referral - Procedure Only; Future  - US Abdomen Limited; Future  - CBC with platelets; Future  - Lipase; Future  - Comprehensive metabolic panel (BMP + Alb, Alk Phos, ALT, AST, Total. Bili, TP); Future      Hypertension, unspecified type  Doing well on norvasc 5     Screen for colon cancer  He will schedule this.        BMI:   Estimated body mass index is 31.28 kg/m  as calculated from the following:    Height as of this encounter: 1.753 m (5' 9\").    Weight as of this encounter: 96.1 kg (211 lb 12.8 oz).   Weight management plan: Discussed healthy diet and exercise guidelines    Regular exercise    No follow-ups on file.    Mariza August MD  [facility] [place] Toribio is a 52 year old, presenting for the following health issues:  Abdominal Pain      History of Present Illness       Reason for visit:  Stomach discomfort and pressure    He eats 0-1 servings of fruits and vegetables daily.He consumes 2 sweetened beverage(s) daily.He exercises with enough effort to increase his heart rate 20 to 29 minutes per day.  He exercises with enough effort to increase his heart rate 4 days per week. He is missing 1 dose(s) of medications per week.  He is not taking prescribed medications regularly due to remembering to take.       History of 14 years of intermittent epigastric abdominal pain - he will get bouts of it that last a couple months.    Has had 2-3 months of pain this episode  - doesn't inhibit what he does  He tries not to eat spicy food, fatty food, caffeine, etc  He has tried omeprazole without help - took it for 2 weeks daily   No diarrhea or " "constipation  No black/tarry/bloody stools.  No dysuria    No history of abdominal surgery     History of EGD 14 years ago  - normal - in epic 2009    Hypertension   On norvasc 5     Due for colonoscopy - hasn't scheduled it yet      He is a  and a  and a  for the fire dept     Review of Systems   No f/c   No weight loss/night sweats  No chest pain/sob/cough          Objective    /84   Pulse 69   Temp 98  F (36.7  C) (Tympanic)   Ht 1.753 m (5' 9\")   Wt 96.1 kg (211 lb 12.8 oz)   SpO2 97%   BMI 31.28 kg/m    Body mass index is 31.28 kg/m .  Physical Exam   GENERAL: healthy, alert and no distress  EYES: Eyes grossly normal to inspection, PERRL and conjunctivae and sclerae normal  NECK: no adenopathy, no asymmetry, masses, or scars and thyroid normal to palpation  RESP: lungs clear to auscultation - no rales, rhonchi or wheezes  CV: regular rate and rhythm, normal S1 S2, no S3 or S4, no murmur, click or rub, no peripheral edema and peripheral pulses strong  ABDOMEN: soft, nontender, no hepatosplenomegaly, no masses and bowel sounds normal  MS: no gross musculoskeletal defects noted, no edema  PSYCH: mentation appears normal, affect normal/bright            "

## 2023-02-06 ENCOUNTER — HOSPITAL ENCOUNTER (OUTPATIENT)
Facility: CLINIC | Age: 53
End: 2023-02-06
Attending: SURGERY | Admitting: SURGERY
Payer: COMMERCIAL

## 2023-02-06 ENCOUNTER — TELEPHONE (OUTPATIENT)
Dept: FAMILY MEDICINE | Facility: CLINIC | Age: 53
End: 2023-02-06
Payer: COMMERCIAL

## 2023-02-06 NOTE — TELEPHONE ENCOUNTER
"    Screening Questions  BLUE  KIND OF PREP RED  LOCATION [review exclusion criteria] GREEN  SEDATION TYPE        T Are you active on mychart?       [place] Ordering/Referring Provider?        [employer] What type of coverage do you have?      N Have you had a positive covid test in the last 14 days?     31.28 1. BMI  [BMI 40+ - review exclusion criteria]    Y  2. Are you able to give consent for your medical care? [IF NO,RN REVIEW]          N  3. Are you taking any prescription pain medications on a routine schedule   (ex narcotics: tramadol, oxycodone, roxicodone, oxycontin,  and percocet)?        N  3a. EXTENDED PREP What kind of prescription?     N 4. Do you have any chemical dependencies such as alcohol, street drugs, or methadone?        **If yes 3- 5 , please schedule with MAC sedation.**          IF YES TO ANY 6 - 10 - HOSPITAL SETTING ONLY.     N 6.   Do you need assistance transferring?     N 7.   Have you had a heart or lung transplant?    N 8.   Are you currently on dialysis?   N 9.   Do you use daily home oxygen?   N 10. Do you take nitroglycerin?   10a. N If yes, how often?     11. [FEMALES]  N Are you currently pregnant?    11a. N If yes, how many weeks? [ Greater than 12 weeks, OR NEEDED]    N 12. Do you have Pulmonary Hypertension? *NEED PAC APPT AT [facility]*     N 13. [review exclusion criteria]  Do you have any implantable devices in your body (pacemaker, defib, LVAD)?    N 14. In the past 6 months, have you had any heart related issues including cardiomyopathy or heart attack?     14a. N If yes, did it require cardiac stenting if so when?     N 15. Have you had a stroke or Transient ischemic attack (TIA - aka  mini stroke ) within 6 months?      N 16. Do you have mod to severe Obstructive Sleep Apnea?  [Hospital only]    N 17. Do you have SEVERE AND UNCONTROLLED asthma? *NEED PAC APPT AT [facility]*     N 18. Are you currently taking any blood thinners?     18a. If yes, inform patient to \"follow up w/ ordering " "provider for bridging instructions.\"    N 19. Do you take the medication Phentermine?    19a. If yes, \"Hold for 7 days before procedure.  Please consult your prescribing provider if you have questions about holding this medication.\"     N  20. Do you have chronic kidney disease?      N  21. Do you have a diagnosis of diabetes?     N  22. On a regular basis do you go 3-5 days between bowel movements?     See below 23. Preferred LOCAL Pharmacy for Pre Prescription    [ LIST ONLY ONE PHARMACY]     [id number] - [place], [place] - [address]        - CLOSING REMINDERS -    Informed patient they will need an adult    Cannot take any type of public or medical transportation alone    Conscious Sedation- Needs  for 6 hours after the procedure       MAC/General-Needs  for 24 hours after procedure    Pre-Procedure Covid test to be completed [[facility] PCR Testing Required]    Confirmed Nurse will call to complete assessment       - SCHEDULING DETAILS -  N Hospital Setting Required? If yes, what is the exclusion?: SUNNY Talbert  Surgeon    3-29-23  Date of Procedure  Upper and Lower Endoscopy [EGD and Colonoscopy]  Type of Procedure Scheduled  [facility] [place] answer yes to questions #8, #20, #21Which Colonoscopy Prep was Sent?     GEN Sedation Type     N PAC / Pre-op Required                 "

## 2023-02-23 ENCOUNTER — ANESTHESIA EVENT (OUTPATIENT)
Dept: GASTROENTEROLOGY | Facility: CLINIC | Age: 53
End: 2023-02-23
Payer: COMMERCIAL

## 2023-02-23 NOTE — ANESTHESIA PREPROCEDURE EVALUATION
Anesthesia Pre-Procedure Evaluation    Patient: Oral Blount   MRN: [id number] : 1970        Procedure : Procedure(s):  ESOPHAGOGASTRODUODENOSCOPY (EGD)          Past Medical History:   Diagnosis Date     Allergic rhinitis, cause unspecified     otc      Basal cell carcinoma      Tobacco use disorder     quit -3, 2 years smoked.       Past Surgical History:   Procedure Laterality Date     SURGICAL HISTORY OF -       vasectomy      Allergies   Allergen Reactions     Lisinopril Cough      Social History     Tobacco Use     Smoking status: Never     Smokeless tobacco: Never   Substance Use Topics     Alcohol use: No      Wt Readings from Last 1 Encounters:   23 96.1 kg (211 lb 12.8 oz)        Anesthesia Evaluation   Pt has had prior anesthetic.         ROS/MED HX  ENT/Pulmonary:     (+) allergic rhinitis,     Neurologic:       Cardiovascular:     (+) Dyslipidemia hypertension-----    METS/Exercise Tolerance:     Hematologic:       Musculoskeletal:   (+) arthritis,     GI/Hepatic:       Renal/Genitourinary:       Endo:     (+) Obesity,     Psychiatric/Substance Use:       Infectious Disease:       Malignancy:   (+) Malignancy, History of Skin.Skin CA Remission status post Surgery.        Other:            Physical Exam    Airway  airway exam normal      Mallampati: I   TM distance: > 3 FB   Neck ROM: full   Mouth opening: > 3 cm    Respiratory Devices and Support         Dental       (+) Minor Abnormalities - some fillings, tiny chips      Cardiovascular   cardiovascular exam normal       Rhythm and rate: regular and normal     Pulmonary   pulmonary exam normal        breath sounds clear to auscultation           OUTSIDE LABS:  CBC:   Lab Results   Component Value Date    WBC 6.2 2023    WBC 5.2 2022    HGB 15.1 2023    HGB 15.0 2022    HCT 43.8 2023    HCT 43.5 2022     2023     2022     BMP:   Lab Results   Component Value Date      02/03/2023     04/19/2021    POTASSIUM 4.4 02/03/2023    POTASSIUM 4.1 04/19/2021    CHLORIDE 106 02/03/2023    CHLORIDE 109 04/19/2021    CO2 29 02/03/2023    CO2 29 04/19/2021    BUN 16.5 02/03/2023    BUN 17 04/19/2021    CR 1.18 (H) 02/03/2023    CR 1.14 04/19/2021    GLC 90 02/03/2023    GLC 84 04/19/2021     COAGS: No results found for: PTT, INR, FIBR  POC: No results found for: BGM, HCG, HCGS  HEPATIC:   Lab Results   Component Value Date    ALBUMIN 4.5 02/03/2023    PROTTOTAL 7.4 02/03/2023    ALT 32 02/03/2023    AST 21 02/03/2023    ALKPHOS 57 02/03/2023    BILITOTAL 0.4 02/03/2023     OTHER:   Lab Results   Component Value Date    A1C 4.9 04/19/2021    MARY 9.5 02/03/2023    LIPASE 13 02/03/2023    TSH 2.01 10/31/2018       Anesthesia Plan    ASA Status:  2   NPO Status:  NPO Appropriate    Anesthesia Type: General.     - Airway: Native airway   Induction: Intravenous, Propofol.   Maintenance: TIVA.        Consents    Anesthesia Plan(s) and associated risks, benefits, and realistic alternatives discussed. Questions answered and patient/representative(s) expressed understanding.     - Discussed: Risks, Benefits and Alternatives for BOTH SEDATION and the PROCEDURE were discussed     - Discussed with:  Patient      - Extended Intubation/Ventilatory Support Discussed: No.      - Patient is DNR/DNI Status: No    Use of blood products discussed: No .     Postoperative Care            Comments:                WOOD Clemens CRNA

## 2023-02-24 ENCOUNTER — ANESTHESIA (OUTPATIENT)
Dept: GASTROENTEROLOGY | Facility: CLINIC | Age: 53
End: 2023-02-24
Payer: COMMERCIAL

## 2023-02-24 ENCOUNTER — HOSPITAL ENCOUNTER (OUTPATIENT)
Facility: CLINIC | Age: 53
Discharge: HOME | End: 2023-02-24
Attending: SURGERY | Admitting: SURGERY
Payer: COMMERCIAL

## 2023-02-24 VITALS
HEIGHT: 69 IN | SYSTOLIC BLOOD PRESSURE: 125 MMHG | RESPIRATION RATE: 16 BRPM | OXYGEN SATURATION: 98 % | BODY MASS INDEX: 31.37 KG/M2 | DIASTOLIC BLOOD PRESSURE: 88 MMHG | TEMPERATURE: 98.3 F | WEIGHT: 211.8 LBS | HEART RATE: 68 BPM

## 2023-02-24 LAB — UPPER GI ENDOSCOPY: NORMAL

## 2023-02-24 PROCEDURE — 43239 EGD BIOPSY SINGLE/MULTIPLE: CPT | Performed by: SURGERY

## 2023-02-24 PROCEDURE — 250N000009 HC RX 250: Performed by: NURSE ANESTHETIST, CERTIFIED REGISTERED

## 2023-02-24 PROCEDURE — 88305 TISSUE EXAM BY PATHOLOGIST: CPT | Mod: TC | Performed by: SURGERY

## 2023-02-24 PROCEDURE — 250N000011 HC RX IP 250 OP 636: Performed by: NURSE ANESTHETIST, CERTIFIED REGISTERED

## 2023-02-24 PROCEDURE — 258N000003 HC RX IP 258 OP 636: Performed by: SURGERY

## 2023-02-24 PROCEDURE — 88305 TISSUE EXAM BY PATHOLOGIST: CPT | Mod: 26 | Performed by: PATHOLOGY

## 2023-02-24 PROCEDURE — 250N000009 HC RX 250: Performed by: SURGERY

## 2023-02-24 PROCEDURE — 370N000017 HC ANESTHESIA TECHNICAL FEE, PER MIN: Performed by: SURGERY

## 2023-02-24 RX ORDER — GLYCOPYRROLATE 0.2 MG/ML
INJECTION, SOLUTION INTRAMUSCULAR; INTRAVENOUS PRN
Status: DISCONTINUED | OUTPATIENT
Start: 2023-02-24 | End: 2023-02-24

## 2023-02-24 RX ORDER — LIDOCAINE HYDROCHLORIDE 20 MG/ML
INJECTION, SOLUTION INFILTRATION; PERINEURAL PRN
Status: DISCONTINUED | OUTPATIENT
Start: 2023-02-24 | End: 2023-02-24

## 2023-02-24 RX ORDER — PROPOFOL 10 MG/ML
INJECTION, EMULSION INTRAVENOUS CONTINUOUS PRN
Status: DISCONTINUED | OUTPATIENT
Start: 2023-02-24 | End: 2023-02-24

## 2023-02-24 RX ORDER — SODIUM CHLORIDE, SODIUM LACTATE, POTASSIUM CHLORIDE, CALCIUM CHLORIDE 600; 310; 30; 20 MG/100ML; MG/100ML; MG/100ML; MG/100ML
INJECTION, SOLUTION INTRAVENOUS CONTINUOUS
Status: DISCONTINUED | OUTPATIENT
Start: 2023-02-24 | End: 2023-02-24 | Stop reason: HOSPADM

## 2023-02-24 RX ORDER — LIDOCAINE 40 MG/G
CREAM TOPICAL
Status: DISCONTINUED | OUTPATIENT
Start: 2023-02-24 | End: 2023-02-24 | Stop reason: HOSPADM

## 2023-02-24 RX ADMIN — LIDOCAINE HYDROCHLORIDE 0.3 ML: 10 INJECTION, SOLUTION EPIDURAL; INFILTRATION; INTRACAUDAL; PERINEURAL at 10:55

## 2023-02-24 RX ADMIN — PROPOFOL 150 MCG/KG/MIN: 10 INJECTION, EMULSION INTRAVENOUS at 11:31

## 2023-02-24 RX ADMIN — LIDOCAINE HYDROCHLORIDE 100 MG: 20 INJECTION, SOLUTION INFILTRATION; PERINEURAL at 11:31

## 2023-02-24 RX ADMIN — GLYCOPYRROLATE 0.2 MG: 0.2 INJECTION, SOLUTION INTRAMUSCULAR; INTRAVENOUS at 11:29

## 2023-02-24 RX ADMIN — SODIUM CHLORIDE, POTASSIUM CHLORIDE, SODIUM LACTATE AND CALCIUM CHLORIDE 30 ML: 600; 310; 30; 20 INJECTION, SOLUTION INTRAVENOUS at 10:54

## 2023-02-24 RX ADMIN — SODIUM CHLORIDE, POTASSIUM CHLORIDE, SODIUM LACTATE AND CALCIUM CHLORIDE: 600; 310; 30; 20 INJECTION, SOLUTION INTRAVENOUS at 11:30

## 2023-02-24 RX ADMIN — TOPICAL ANESTHETIC 1 EACH: 200 SPRAY DENTAL; PERIODONTAL at 11:33

## 2023-02-24 RX ADMIN — TOPICAL ANESTHETIC 1 EACH: 200 SPRAY DENTAL; PERIODONTAL at 11:29

## 2023-02-24 ASSESSMENT — ACTIVITIES OF DAILY LIVING (ADL): ADLS_ACUITY_SCORE: 35

## 2023-02-24 NOTE — LETTER
Oral Blount  [address] [facility] [address]  March 3, 2023    Dear Oral,   This letter is to inform you of the results of your pathology report on your upper endoscopy (EGD).    Your pathology report was:  Final Diagnosis   A.  Gastroesophageal junction: Biopsy:  - Squamous mucosa with mild reflux type changes  - No columnar epithelium identified  B.  Stomach, antrum: Biopsy:  - Antral-type mucosa within normal limits  - No Helicobacter pylori-like organisms seen on H&E examination    Showed findings consistent with reflux (heartburn).  I recommend that you continue using the over-the-counter Prilosec or omeprazole.  If this improves your symptoms please follow-up with your primary care provider for adjustment or additional refills.    If you have any questions or concerns please do not hesitate to call my office at [phone].  Sincerely,     Randolph Healtho, DO  [facility]

## 2023-02-24 NOTE — ANESTHESIA CARE TRANSFER NOTE
Patient: Oral Blount    Procedure: Procedure(s):  ESOPHAGOGASTRODUODENOSCOPY, WITH BIOPSY       Diagnosis: Epigastric pain [R10.13]  Diagnosis Additional Information: No value filed.    Anesthesia Type:   General     Note:    Oropharynx: oropharynx clear of all foreign objects  Level of Consciousness: awake  Oxygen Supplementation: room air    Independent Airway: airway patency satisfactory and stable  Dentition: dentition unchanged  Vital Signs Stable: post-procedure vital signs reviewed and stable  Report to RN Given: handoff report given  Patient transferred to: Phase II    Handoff Report: Identifed the Patient, Identified the Reponsible Provider, Reviewed the pertinent medical history, Discussed the surgical course, Reviewed Intra-OP anesthesia mangement and issues during anesthesia, Set expectations for post-procedure period and Allowed opportunity for questions and acknowledgement of understanding      Vitals:  Vitals Value Taken Time   BP     Temp     Pulse     Resp     SpO2         Electronically Signed By: WOOD Suresh CRNA  February 24, 2023  11:47 AM

## 2023-02-24 NOTE — ANESTHESIA POSTPROCEDURE EVALUATION
Patient: Oral Blount    Procedure: Procedure(s):  ESOPHAGOGASTRODUODENOSCOPY, WITH BIOPSY       Anesthesia Type:  General    Note:  Disposition: Outpatient   Postop Pain Control: Uneventful            Sign Out: Well controlled pain   PONV: No   Neuro/Psych: Uneventful            Sign Out: Acceptable/Baseline neuro status   Airway/Respiratory: Uneventful            Sign Out: Acceptable/Baseline resp. status   CV/Hemodynamics: Uneventful            Sign Out: Acceptable CV status; No obvious hypovolemia; No obvious fluid overload   Other NRE: NONE   DID A NON-ROUTINE EVENT OCCUR? No           Last vitals:  Vitals Value Taken Time   BP     Temp     Pulse     Resp     SpO2         Electronically Signed By: WOOD Suresh CRNA  February 24, 2023  12:15 PM

## 2023-02-24 NOTE — INTERVAL H&P NOTE
"I have reviewed the surgical (or preoperative) H&P that is linked to this encounter, and examined the patient. There are no significant changes    epigastric pain; EGD in 2009 (nl); not on PPI; GBUS order in place; no blood thinner    Clinical Conditions Present on Arrival:  Clinically Significant Risk Factors Present on Admission                    # Obesity: Estimated body mass index is 31.26 kg/m  as calculated from the following:    Height as of this encounter: 1.753 m (5' 9.02\").    Weight as of this encounter: 96.1 kg (211 lb 12.8 oz).       "

## 2023-02-27 LAB
PATH REPORT.COMMENTS IMP SPEC: NORMAL
PATH REPORT.COMMENTS IMP SPEC: NORMAL
PATH REPORT.FINAL DX SPEC: NORMAL
PATH REPORT.GROSS SPEC: NORMAL
PATH REPORT.MICROSCOPIC SPEC OTHER STN: NORMAL
PATH REPORT.RELEVANT HX SPEC: NORMAL
PHOTO IMAGE: NORMAL

## 2023-03-22 ENCOUNTER — E-VISIT (OUTPATIENT)
Dept: FAMILY MEDICINE | Facility: CLINIC | Age: 53
End: 2023-03-22
Payer: COMMERCIAL

## 2023-03-22 DIAGNOSIS — B00.1 COLD SORE: Primary | ICD-10-CM

## 2023-03-22 PROCEDURE — 99421 OL DIG E/M SVC 5-10 MIN: CPT | Performed by: FAMILY MEDICINE

## 2023-03-23 RX ORDER — VALACYCLOVIR HYDROCHLORIDE 1 G/1
2000 TABLET, FILM COATED ORAL 2 TIMES DAILY
Qty: 4 TABLET | Refills: 1 | Status: SHIPPED | OUTPATIENT
Start: 2023-03-23 | End: 2023-12-26

## 2023-03-23 NOTE — TELEPHONE ENCOUNTER
Provider E-Visit time total (minutes): 6   [Dear  ___] : Dear  [unfilled], [( Thank you for referring [unfilled] for consultation for _____ )] : Thank you for referring [unfilled] for consultation for [unfilled] [Please see my note below.] : Please see my note below. [Consult Closing:] : Thank you very much for allowing me to participate in the care of this patient.  If you have any questions, please do not hesitate to contact me. [Sincerely,] : Sincerely, [FreeTextEntry2] : Dr Katlyn Walker [FreeTextEntry3] : Rachael Arnada

## 2023-04-18 ENCOUNTER — APPOINTMENT (OUTPATIENT)
Dept: GENERAL RADIOLOGY | Facility: CLINIC | Age: 53
End: 2023-04-18
Attending: EMERGENCY MEDICINE
Payer: COMMERCIAL

## 2023-04-18 ENCOUNTER — HOSPITAL ENCOUNTER (EMERGENCY)
Facility: CLINIC | Age: 53
Discharge: HOME | End: 2023-04-18
Attending: EMERGENCY MEDICINE | Admitting: EMERGENCY MEDICINE
Payer: COMMERCIAL

## 2023-04-18 VITALS
OXYGEN SATURATION: 99 % | HEIGHT: 69 IN | BODY MASS INDEX: 30.36 KG/M2 | TEMPERATURE: 97 F | DIASTOLIC BLOOD PRESSURE: 96 MMHG | WEIGHT: 205 LBS | HEART RATE: 67 BPM | RESPIRATION RATE: 18 BRPM | SYSTOLIC BLOOD PRESSURE: 135 MMHG

## 2023-04-18 DIAGNOSIS — S39.012A STRAIN OF LUMBAR REGION, INITIAL ENCOUNTER: ICD-10-CM

## 2023-04-18 DIAGNOSIS — M62.830 LUMBAR PARASPINAL MUSCLE SPASM: ICD-10-CM

## 2023-04-18 PROCEDURE — 250N000011 HC RX IP 250 OP 636: Performed by: EMERGENCY MEDICINE

## 2023-04-18 PROCEDURE — 96372 THER/PROPH/DIAG INJ SC/IM: CPT | Performed by: EMERGENCY MEDICINE

## 2023-04-18 PROCEDURE — 99284 EMERGENCY DEPT VISIT MOD MDM: CPT | Performed by: EMERGENCY MEDICINE

## 2023-04-18 PROCEDURE — 72100 X-RAY EXAM L-S SPINE 2/3 VWS: CPT

## 2023-04-18 RX ORDER — CYCLOBENZAPRINE HCL 10 MG
10 TABLET ORAL 3 TIMES DAILY PRN
Qty: 30 TABLET | Refills: 0 | Status: SHIPPED | OUTPATIENT
Start: 2023-04-18 | End: 2024-04-03

## 2023-04-18 RX ORDER — OXYCODONE HYDROCHLORIDE 5 MG/1
5-10 TABLET ORAL EVERY 6 HOURS PRN
Qty: 15 TABLET | Refills: 0 | Status: SHIPPED | OUTPATIENT
Start: 2023-04-18 | End: 2023-04-18

## 2023-04-18 RX ORDER — CYCLOBENZAPRINE HCL 10 MG
10 TABLET ORAL 3 TIMES DAILY PRN
Qty: 30 TABLET | Refills: 0 | Status: SHIPPED | OUTPATIENT
Start: 2023-04-18 | End: 2023-04-18

## 2023-04-18 RX ORDER — OXYCODONE HYDROCHLORIDE 5 MG/1
5-10 TABLET ORAL EVERY 6 HOURS PRN
Qty: 15 TABLET | Refills: 0 | Status: SHIPPED | OUTPATIENT
Start: 2023-04-18 | End: 2024-04-03

## 2023-04-18 RX ORDER — KETOROLAC TROMETHAMINE 30 MG/ML
60 INJECTION, SOLUTION INTRAMUSCULAR; INTRAVENOUS ONCE
Status: COMPLETED | OUTPATIENT
Start: 2023-04-18 | End: 2023-04-18

## 2023-04-18 RX ADMIN — KETOROLAC TROMETHAMINE 60 MG: 30 INJECTION, SOLUTION INTRAMUSCULAR; INTRAVENOUS at 09:42

## 2023-04-18 ASSESSMENT — ACTIVITIES OF DAILY LIVING (ADL): ADLS_ACUITY_SCORE: 35

## 2023-04-18 NOTE — ED TRIAGE NOTES
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      Triage Assessment     Row Name 04/18/23 0847       Triage Assessment (Adult)    Airway WDL WDL       Respiratory WDL    Respiratory WDL WDL       Skin Circulation/Temperature WDL    Skin Circulation/Temperature WDL WDL       Cardiac WDL    Cardiac WDL WDL       Peripheral/Neurovascular WDL    Peripheral Neurovascular WDL WDL       Cognitive/Neuro/Behavioral WDL    Cognitive/Neuro/Behavioral WDL WDL              "

## 2023-04-18 NOTE — ED PROVIDER NOTES
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  He felt a \"twinge\" of pain at the time, but pain worsened and he now feels \"locked up\" in the low back with constant severe pain exacerbated by movement and change in position and refractory to ibuprofen and Tylenol, and care by his chiropractor  x ~ 4 visits since the injury. He reports his chiropractor thought he should come the emergency department and get an MRI.  He has no radicular pain, sensory or motor deficit or bowel or bladder incontinence.  No fever or chills. No abdominal pain or other pertinent history or acute complaints or concerns.      Allergies:  Allergies   Allergen Reactions     Lisinopril Cough       Problem List:    Patient Active Problem List    Diagnosis Date Noted     Non morbid obesity, unspecified obesity type 11/05/2018     Priority: Medium     Hypertension, unspecified type 10/31/2018     Priority: Medium     Gout 09/21/2011     Priority: Medium     LUQ abdominal pain 06/17/2008     Priority: Medium     relation w food -suspect gastric/gerd issue - vs constipation.        Left JOINT PAIN-SHLDER 12/08/2007     Priority: Medium     brief exam - suspicious for overuse injury - PT .  xray appears wnl.       Allergic rhinitis      Priority: Medium     otc   Problem list name updated by automated process. Provider to review          Past Medical History:    Past Medical History:   Diagnosis Date     Allergic rhinitis, cause unspecified      Basal cell carcinoma      Tobacco use disorder        Past Surgical History:    Past Surgical History:   Procedure Laterality Date     ESOPHAGOSCOPY, GASTROSCOPY, DUODENOSCOPY (EGD), COMBINED N/A 2/24/2023    Procedure: ESOPHAGOGASTRODUODENOSCOPY, WITH BIOPSY;  Surgeon: Lucinda, " "MD Jose Alberto;  Location: [facility]     SURGICAL HISTORY OF -   2003    vasectomy       Family History:    Family History   Problem Relation Age of Onset     Eye Disorder Mother         degenerative     Hypertension Father      Diabetes Father      Cancer Father         BCC     Pacemaker Father      Cancer Paternal Grandmother         lung     Cancer Paternal Grandfather         lung     Hypertension Brother      Prostate Problems Brother      Hypertension Brother      Diverticulitis Sister      Melanoma No family hx of        Social History:  Marital Status:   [2]  Social History     Tobacco Use     Smoking status: Never     Smokeless tobacco: Never   Vaping Use     Vaping status: Never Used   Substance Use Topics     Alcohol use: No     Drug use: No        Medications:    cyclobenzaprine (FLEXERIL) 10 MG tablet  oxyCODONE (ROXICODONE) 5 MG tablet  amLODIPine (NORVASC) 5 MG tablet  valACYclovir (VALTREX) 1000 mg tablet        Review of Systems  As mentioned in the HPI, in addition focused review of systems was negative.    Physical Exam   BP: (!) 192/130  Pulse: 72  Temp: 97  F (36.1  C)  Resp: 18  Height: 175.3 cm (5' 9\")  Weight: 93 kg (205 lb)  SpO2: 98 %      Physical Exam  Vitals and nursing note reviewed.   Constitutional:       General: He is not in acute distress.     Appearance: Normal appearance. He is well-developed. He is not ill-appearing or diaphoretic.   HENT:      Head: Normocephalic and atraumatic.      Right Ear: External ear normal.      Left Ear: External ear normal.   Eyes:      General: No scleral icterus.     Extraocular Movements: Extraocular movements intact.      Conjunctiva/sclera: Conjunctivae normal.   Neck:      Trachea: No tracheal deviation.   Cardiovascular:      Rate and Rhythm: Normal rate and regular rhythm.   Pulmonary:      Effort: Pulmonary effort is normal. No respiratory distress.   Abdominal:      Palpations: Abdomen is soft.      Tenderness: There is no right CVA " "tenderness or left CVA tenderness.   Musculoskeletal:         General: No swelling.      Cervical back: Normal, normal range of motion and neck supple.      Thoracic back: Normal.      Lumbar back: Spasms and tenderness present. No bony tenderness. Decreased range of motion.        Back:       Right lower leg: No edema.      Left lower leg: No edema.   Skin:     General: Skin is warm and dry.      Coloration: Skin is not pale.      Findings: No erythema or rash.   Neurological:      General: No focal deficit present.      Mental Status: He is alert and oriented to person, place, and time.      Coordination: Coordination normal.   Psychiatric:         Mood and Affect: Mood normal.         Behavior: Behavior normal.         ED Course           Procedures              Results for orders placed or performed during the hospital encounter of 04/18/23 (from the past 24 hour(s))   Lumbar spine XR, 2-3 views    Narrative    LUMBAR SPINE TWO TO THREE VIEWS April 18, 2023 9:58 AM     HISTORY: Back strain and pain.    COMPARISON: None.      Impression    IMPRESSION: No fracture is identified. Multilevel mild degenerative  disc disease. Mild lower lumbar spine facet arthropathy.    MINNIE ESPINOSA MD         SYSTEM ID:  [id number]     I independently reviewed the X-rays: Agree with the Radiologist's interpretation.    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 He felt a \"twinge\" of pain at the time, but pain worsened and he now feels \"locked up\" in the low back consistent with significant paraspinous muscular spasm identified on exam.  He has no signs or symptoms of cauda equina syndrome and no symptoms to suggest an epidural abscess or other " neurologic, neurosurgical infectious disease process, or AAA/dissection.  Plain films were performed and showed no evidence of compression fracture or other significant abnormality, but evidence of degenerative disease.  He felt improved after Toradol in the ED (opiate analgesic deferred as he took an oxycodone from an old Rx of his wife's, and he drove himself to the ED). I will rx Oxycodone to use for pain refractory to OTC analgesics, Flexeril for spasm and make a referral for physical therapy and a spine  referral.  He will also follow-up in primary care clinic.  He was provided instructions for supportive care and will return as needed for worsened condition or worsening symptoms, or new problems or concerns.    I have reviewed the nursing notes.    I have reviewed the findings, diagnosis, plan and need for follow up with the patient.      Discharge Medication List as of 4/18/2023 11:35 AM   cyclobenzaprine (FLEXERIL) 10 MG tablet 30 tablet 0 4/18/2023  No   Sig - Route: Take 1 tablet (10 mg) by mouth 3 times daily as needed for muscle spasms - Oral   Sent to pharmacy as: Cyclobenzaprine HCl 10 MG Oral Tablet (FLEXERIL)   Class: E-Prescribe   Order: [id number]   E-Prescribing Status: Receipt confirmed by pharmacy (4/18/2023 11:34 AM CDT)     oxyCODONE (ROXICODONE) 5 MG tablet 15 tablet 0 4/18/2023  No   Sig - Route: Take 1-2 tablets (5-10 mg) by mouth every 6 hours as needed for pain or moderate to severe pain - Oral   Sent to pharmacy as: oxyCODONE HCl 5 MG Oral Tablet (ROXICODONE)   Class: E-Prescribe   Earliest Fill Date: 4/18/2023   Order: [id number]   E-Prescribing Status: Receipt confirmed by pharmacy (4/18/2023 11:34 AM CDT)            Final diagnoses:   Strain of lumbar region, initial encounter   Lumbar paraspinal muscle spasm       4/18/2023   [facility] EMERGENCY DEPT          Eliceo Bonner MD  04/18/23 3359

## 2023-04-25 ENCOUNTER — APPOINTMENT (OUTPATIENT)
Dept: CT IMAGING | Facility: CLINIC | Age: 53
End: 2023-04-25
Attending: EMERGENCY MEDICINE
Payer: COMMERCIAL

## 2023-04-25 ENCOUNTER — HOSPITAL ENCOUNTER (EMERGENCY)
Facility: CLINIC | Age: 53
Discharge: HOME | End: 2023-04-25
Attending: EMERGENCY MEDICINE | Admitting: EMERGENCY MEDICINE
Payer: COMMERCIAL

## 2023-04-25 VITALS
TEMPERATURE: 96.8 F | HEIGHT: 69 IN | DIASTOLIC BLOOD PRESSURE: 87 MMHG | WEIGHT: 205 LBS | SYSTOLIC BLOOD PRESSURE: 138 MMHG | HEART RATE: 68 BPM | OXYGEN SATURATION: 95 % | BODY MASS INDEX: 30.36 KG/M2 | RESPIRATION RATE: 16 BRPM

## 2023-04-25 DIAGNOSIS — S39.012A STRAIN OF FASCIA OF LOWER BACK: ICD-10-CM

## 2023-04-25 LAB
ALBUMIN UR-MCNC: NEGATIVE MG/DL
ANION GAP SERPL CALCULATED.3IONS-SCNC: 9 MMOL/L (ref 7–15)
APPEARANCE UR: CLEAR
BASOPHILS # BLD AUTO: 0 10E3/UL (ref 0–0.2)
BASOPHILS NFR BLD AUTO: 0 %
BILIRUB UR QL STRIP: NEGATIVE
BUN SERPL-MCNC: 16 MG/DL (ref 6–20)
CALCIUM SERPL-MCNC: 9.5 MG/DL (ref 8.6–10)
CHLORIDE SERPL-SCNC: 106 MMOL/L (ref 98–107)
COLOR UR AUTO: YELLOW
CREAT SERPL-MCNC: 1.2 MG/DL (ref 0.67–1.17)
DEPRECATED HCO3 PLAS-SCNC: 27 MMOL/L (ref 22–29)
EOSINOPHIL # BLD AUTO: 0.1 10E3/UL (ref 0–0.7)
EOSINOPHIL NFR BLD AUTO: 1 %
ERYTHROCYTE [DISTWIDTH] IN BLOOD BY AUTOMATED COUNT: 12 % (ref 10–15)
GFR SERPL CREATININE-BSD FRML MDRD: 73 ML/MIN/1.73M2
GLUCOSE SERPL-MCNC: 136 MG/DL (ref 70–99)
GLUCOSE UR STRIP-MCNC: NEGATIVE MG/DL
HCT VFR BLD AUTO: 44.2 % (ref 40–53)
HGB BLD-MCNC: 15.6 G/DL (ref 13.3–17.7)
HGB UR QL STRIP: NEGATIVE
IMM GRANULOCYTES # BLD: 0 10E3/UL
IMM GRANULOCYTES NFR BLD: 0 %
KETONES UR STRIP-MCNC: NEGATIVE MG/DL
LEUKOCYTE ESTERASE UR QL STRIP: NEGATIVE
LYMPHOCYTES # BLD AUTO: 1.1 10E3/UL (ref 0.8–5.3)
LYMPHOCYTES NFR BLD AUTO: 14 %
MCH RBC QN AUTO: 31 PG (ref 26.5–33)
MCHC RBC AUTO-ENTMCNC: 35.3 G/DL (ref 31.5–36.5)
MCV RBC AUTO: 88 FL (ref 78–100)
MONOCYTES # BLD AUTO: 0.6 10E3/UL (ref 0–1.3)
MONOCYTES NFR BLD AUTO: 8 %
NEUTROPHILS # BLD AUTO: 6 10E3/UL (ref 1.6–8.3)
NEUTROPHILS NFR BLD AUTO: 77 %
NITRATE UR QL: NEGATIVE
NRBC # BLD AUTO: 0 10E3/UL
NRBC BLD AUTO-RTO: 0 /100
PH UR STRIP: 7 [PH] (ref 5–7)
PLATELET # BLD AUTO: 197 10E3/UL (ref 150–450)
POTASSIUM SERPL-SCNC: 4.4 MMOL/L (ref 3.4–5.3)
RBC # BLD AUTO: 5.03 10E6/UL (ref 4.4–5.9)
SODIUM SERPL-SCNC: 142 MMOL/L (ref 136–145)
SP GR UR STRIP: 1.02 (ref 1–1.03)
UROBILINOGEN UR STRIP-MCNC: NORMAL MG/DL
WBC # BLD AUTO: 7.8 10E3/UL (ref 4–11)

## 2023-04-25 PROCEDURE — 96372 THER/PROPH/DIAG INJ SC/IM: CPT | Performed by: EMERGENCY MEDICINE

## 2023-04-25 PROCEDURE — 81003 URINALYSIS AUTO W/O SCOPE: CPT | Performed by: EMERGENCY MEDICINE

## 2023-04-25 PROCEDURE — 85025 COMPLETE CBC W/AUTO DIFF WBC: CPT | Performed by: EMERGENCY MEDICINE

## 2023-04-25 PROCEDURE — 99284 EMERGENCY DEPT VISIT MOD MDM: CPT | Performed by: EMERGENCY MEDICINE

## 2023-04-25 PROCEDURE — 74176 CT ABD & PELVIS W/O CONTRAST: CPT

## 2023-04-25 PROCEDURE — 80048 BASIC METABOLIC PNL TOTAL CA: CPT | Performed by: EMERGENCY MEDICINE

## 2023-04-25 PROCEDURE — 36415 COLL VENOUS BLD VENIPUNCTURE: CPT | Performed by: EMERGENCY MEDICINE

## 2023-04-25 PROCEDURE — 250N000011 HC RX IP 250 OP 636: Performed by: EMERGENCY MEDICINE

## 2023-04-25 PROCEDURE — 99285 EMERGENCY DEPT VISIT HI MDM: CPT | Mod: 25 | Performed by: EMERGENCY MEDICINE

## 2023-04-25 RX ORDER — OXYCODONE AND ACETAMINOPHEN 5; 325 MG/1; MG/1
1 TABLET ORAL EVERY 6 HOURS PRN
Qty: 8 TABLET | Refills: 0 | Status: SHIPPED | OUTPATIENT
Start: 2023-04-25 | End: 2024-04-03

## 2023-04-25 RX ORDER — KETOROLAC TROMETHAMINE 30 MG/ML
30 INJECTION, SOLUTION INTRAMUSCULAR; INTRAVENOUS ONCE
Status: COMPLETED | OUTPATIENT
Start: 2023-04-25 | End: 2023-04-25

## 2023-04-25 RX ADMIN — KETOROLAC TROMETHAMINE 30 MG: 30 INJECTION, SOLUTION INTRAMUSCULAR; INTRAVENOUS at 12:38

## 2023-04-25 ASSESSMENT — ACTIVITIES OF DAILY LIVING (ADL)
ADLS_ACUITY_SCORE: 37
ADLS_ACUITY_SCORE: 35

## 2023-04-25 NOTE — ED PROVIDER NOTES
History     Chief Complaint   Patient presents with     Back Pain     Middle lower back spasm     Flank Pain     Left flank since middle of the night     HPI  Oral Blount is a 52 year old male with past medical history significant for hypertension gout and allergic rhinitis who presents emergency department complaining of continued back pain.  Patient states for the past few days he has had significant pain in his left flank region.  He has been having back spasms over the past 6 several weeks and was seen in clinic on 18 April.  Sent home with Flexeril and oxycodone patient states they have helped but he is still having back pain and now the left-sided flank pain which started yesterday and is worsened.  Patient denies any urinary symptoms has not noticed any blood in his urine has not any fevers or chills denies any headache or neck pain.  He denies any focal numbness weakness in any extremity denies any bowel or bladder dysfunction.  He has not had any calf pain.  He has not had a rash.  Injury occurred secondary have to heavy lifting has not been lifting any heavy objects recently.    Allergies:  Allergies   Allergen Reactions     Lisinopril Cough       Problem List:    Patient Active Problem List    Diagnosis Date Noted     Non morbid obesity, unspecified obesity type 11/05/2018     Priority: Medium     Hypertension, unspecified type 10/31/2018     Priority: Medium     Gout 09/21/2011     Priority: Medium     LUQ abdominal pain 06/17/2008     Priority: Medium     relation w food -suspect gastric/gerd issue - vs constipation.        Left JOINT PAIN-SHLDER 12/08/2007     Priority: Medium     brief exam - suspicious for overuse injury - PT .  xray appears wnl.       Allergic rhinitis      Priority: Medium     otc   Problem list name updated by automated process. Provider to review          Past Medical History:    Past Medical History:   Diagnosis Date     Allergic rhinitis, cause unspecified      Basal  "cell carcinoma      Tobacco use disorder        Past Surgical History:    Past Surgical History:   Procedure Laterality Date     ESOPHAGOSCOPY, GASTROSCOPY, DUODENOSCOPY (EGD), COMBINED N/A 2/24/2023    Procedure: ESOPHAGOGASTRODUODENOSCOPY, WITH BIOPSY;  Surgeon: Jose Alberto Jane MD;  Location: [facility]     SURGICAL HISTORY OF -   2003    vasectomy       Family History:    Family History   Problem Relation Age of Onset     Eye Disorder Mother         degenerative     Hypertension Father      Diabetes Father      Cancer Father         BCC     Pacemaker Father      Cancer Paternal Grandmother         lung     Cancer Paternal Grandfather         lung     Hypertension Brother      Prostate Problems Brother      Hypertension Brother      Diverticulitis Sister      Melanoma No family hx of        Social History:  Marital Status:   [2]  Social History     Tobacco Use     Smoking status: Never     Smokeless tobacco: Never   Vaping Use     Vaping status: Never Used   Substance Use Topics     Alcohol use: No     Drug use: No        Medications:    amLODIPine (NORVASC) 5 MG tablet  cyclobenzaprine (FLEXERIL) 10 MG tablet  oxyCODONE (ROXICODONE) 5 MG tablet  valACYclovir (VALTREX) 1000 mg tablet          Review of Systems  All systems reviewed and other than pertinent positives and negatives in HPI all other systems are negative  Physical Exam   BP: (!) 141/99  Pulse: 86  Temp: 96.8  F (36  C)  Resp: 18  Height: 175.3 cm (5' 9\")  Weight: 93 kg (205 lb)  SpO2: 96 %      Physical Exam  Vitals and nursing note reviewed.   Constitutional:       General: He is not in acute distress.     Appearance: Normal appearance. He is not ill-appearing, toxic-appearing or diaphoretic.   HENT:      Head: Normocephalic and atraumatic.      Nose: Nose normal.      Mouth/Throat:      Mouth: Mucous membranes are moist.      Pharynx: Oropharynx is clear.   Eyes:      Conjunctiva/sclera: Conjunctivae normal.   Cardiovascular:      Rate and Rhythm: " Normal rate and regular rhythm.      Pulses: Normal pulses.      Heart sounds: Normal heart sounds. No murmur heard.  Pulmonary:      Effort: Pulmonary effort is normal.      Breath sounds: Normal breath sounds. No stridor. No wheezing or rhonchi.   Abdominal:      General: Abdomen is flat. Bowel sounds are normal. There is no distension.      Palpations: Abdomen is soft.      Tenderness: There is no abdominal tenderness. There is no right CVA tenderness.      Comments: There is tenderness to palpation of the left flank region.   Musculoskeletal:         General: No swelling. Normal range of motion.      Cervical back: Normal range of motion and neck supple.      Left lower leg: No edema.      Comments: Mild bilateral paraspinous muscle tenderness present.  No erythema edema present.   Skin:     General: Skin is warm and dry.      Capillary Refill: Capillary refill takes less than 2 seconds.      Findings: No rash.   Neurological:      General: No focal deficit present.      Mental Status: He is alert and oriented to person, place, and time.      Sensory: No sensory deficit.      Motor: No weakness.   Psychiatric:         Mood and Affect: Mood normal.         ED Course                 Procedures              Critical Care time:  none               Results for orders placed or performed during the hospital encounter of 04/25/23 (from the past 24 hour(s))   UA reflex to Microscopic   Result Value Ref Range    Color Urine Yellow Colorless, Straw, Light Yellow, Yellow    Appearance Urine Clear Clear    Glucose Urine Negative Negative mg/dL    Bilirubin Urine Negative Negative    Ketones Urine Negative Negative mg/dL    Specific Gravity Urine 1.018 1.003 - 1.035    Blood Urine Negative Negative    pH Urine 7.0 5.0 - 7.0    Protein Albumin Urine Negative Negative mg/dL    Urobilinogen Urine Normal Normal, 2.0 mg/dL    Nitrite Urine Negative Negative    Leukocyte Esterase Urine Negative Negative    Narrative    Microscopic  not indicated       Medications   ketorolac (TORADOL) injection 30 mg (30 mg Intramuscular $Given 4/25/23 1238)     Results for orders placed or performed during the hospital encounter of 04/25/23   Abd/pelvis CT - no contrast - Stone Protocol    Narrative    CT ABDOMEN/PELVIS WITHOUT CONTRAST April 25, 2023 12:50 PM    CLINICAL HISTORY: Left flank pain, rule out kidney stone.    TECHNIQUE: CT scan of the abdomen and pelvis was performed without IV  contrast. Multiplanar reformats were obtained. Dose reduction  techniques were used.  CONTRAST: None.    COMPARISON: 4/20/2021.    FINDINGS:   LOWER CHEST: Mild atelectasis is seen in both lung bases.    HEPATOBILIARY: Normal.    PANCREAS: Normal.    SPLEEN: Normal.    ADRENAL GLANDS: Normal.    KIDNEYS/BLADDER: Normal.    BOWEL: Normal.    LYMPH NODES: Normal.    VASCULATURE: Unremarkable.    PELVIC ORGANS: Normal.    OTHER: None.    MUSCULOSKELETAL: Mild degenerative changes are noted throughout the  spine.      Impression    IMPRESSION:   1.  No renal or ureteral calculi. No hydronephrosis or hydroureter.  2.  No acute pathology through the abdomen and pelvis.    FRANCISCO HOPKINS MD         SYSTEM ID:  [id number]         Assessments & Plan (with Medical Decision Making) records were reviewed including past medical history and medications as well as allergies.  Recent emergency department visit on 4/18/2023 was reviewed.  Basic labs were obtained.  CBC was unremarkable.  Basic metabolic panel significant for creatinine of 1.2.  This is near baseline for patient.  Urine analysis was unremarkable.  Due to patient's history and presentation a CT scan stone protocol was obtained to rule out other causes of his flank pain other than musculoskeletal.  He was agree with obtaining this.  Images were reviewed.  They revealed no renal or urinary calculi no hydronephrosis or hydroureter.  No acute pathology seen.  Patient was given Toradol for pain with mild improvement.   Findings discussed in detail with patient.  I feel this more than likely musculoskeletal back pain.  He has had a recent lumbar spine x-ray which did not show any significant acute abnormality other than multilevel mild degenerative disc disease.  Patient has been scheduled for physical therapy.  I am going to give the patient a few pain pills that he has muscle relaxants at home he should try heat or possibly a lidocaine patch.  If symptoms worsen or new symptoms develop he should return for further evaluation and care.  Patient     I have reviewed the nursing notes.    I have reviewed the findings, diagnosis, plan and need for follow up with the patient.           Discharge Medication List as of 4/25/2023  1:55 PM      START taking these medications    Details   oxyCODONE-acetaminophen (PERCOCET) 5-325 MG tablet Take 1 tablet by mouth every 6 hours as needed for severe pain, Disp-8 tablet, R-0, Local Print             Final diagnoses:   Strain of fascia of lower back       4/25/2023   [facility] EMERGENCY DEPT     Deepak Yeager MD  04/27/23 4380

## 2023-04-25 NOTE — ED TRIAGE NOTES
Pt here with left flank pain since the middle of the night along with nausea. Pt also still has his mid lower back spasms from a work injury on the 4/3/23. Pt took his last oxy today at 0715 along with a muscle relaxer.      Triage Assessment     Row Name 04/25/23 0851       Triage Assessment (Adult)    Airway WDL WDL       Respiratory WDL    Respiratory WDL WDL       Cardiac WDL    Cardiac WDL WDL       Cognitive/Neuro/Behavioral WDL    Cognitive/Neuro/Behavioral WDL WDL

## 2023-04-25 NOTE — DISCHARGE INSTRUCTIONS
Return if symptoms worsen or new symptoms develop.  Follow-up with primary care physician next available.  Drink plenty of fluids.  If increased fever worsening pain bowel or bladder dysfunction focal numbness weakness in extremity please return for recheck use heat.  Try lidocaine patches/Salonpas.  Follow-up with physical therapy as scheduled.  Take ibuprofen or Tylenol for pain use Percocet for severe pain.

## 2023-05-03 ENCOUNTER — HOSPITAL ENCOUNTER (OUTPATIENT)
Dept: PHYSICAL THERAPY | Facility: CLINIC | Age: 53
Setting detail: THERAPIES SERIES
Discharge: HOME | End: 2023-05-03
Attending: EMERGENCY MEDICINE
Payer: COMMERCIAL

## 2023-05-03 DIAGNOSIS — S39.012A STRAIN OF LUMBAR REGION, INITIAL ENCOUNTER: ICD-10-CM

## 2023-05-03 DIAGNOSIS — M62.830 LUMBAR PARASPINAL MUSCLE SPASM: ICD-10-CM

## 2023-05-03 PROCEDURE — 97110 THERAPEUTIC EXERCISES: CPT | Mod: GP

## 2023-05-03 PROCEDURE — 97161 PT EVAL LOW COMPLEX 20 MIN: CPT | Mod: GP

## 2023-05-03 NOTE — PROGRESS NOTES
[facility]    OUTPATIENT PHYSICAL THERAPY ORTHOPEDIC EVALUATION  PLAN OF TREATMENT FOR OUTPATIENT REHABILITATION  (COMPLETE FOR INITIAL CLAIMS ONLY)  Patient's Last Name, First Name, M.I.  YOB: 1970  Oral Blount    Provider s Name:  [facility]   Medical Record No.  [id number]   Start of Care Date:  05/03/23   Onset Date:  04/03/23   Type:     _X__PT   ___OT   ___SLP Medical Diagnosis:  Strain of lumbar region, initial encounter (S39.012A)   Lumbar paraspinal muscle spasm (M62.830)     PT Diagnosis:  Low back pain   Visits from SOC:  1      _________________________________________________________________________________  Plan of Treatment/Functional Goals:  joint mobilization, manual therapy, neuromuscular re-education, ROM, strengthening, stretching     Biofeedback, Electrical stimulation, TENS, Traction, Ultrasound     Goals  Goal Identifier: 1  Goal Description: Pt will be able to sit for 1 hour or greater without increase in low back pain.  Target Date: 05/31/23    Goal Identifier: 2  Goal Description: Pt will be able to don/doff shoes and socks wtihout limitation due to low back pain to improve ADLs.  Target Date: 05/31/23    Goal Identifier: 3  Goal Description: Pt will be able to lift 50# or greater from floor<>waist in order to improve job duties as  without limitation due to low back pain.  Target Date: 05/31/23    Goal Identifier: 4  Goal Description: Pt will be independent with HEP for self-management of  symptoms.  Target Date: 05/31/23                                                Therapy Frequency:  1 time/week  Predicted Duration of Therapy Intervention:  4 weeks    Annita Harding, PT                 I CERTIFY THE NEED FOR THESE SERVICES FURNISHED UNDER        THIS PLAN OF TREATMENT AND WHILE UNDER MY CARE      (Physician co-signature of this document indicates review and certification of the therapy plan).                       Certification Date From:  05/03/23   Certification Date To:  05/31/23    Referring Provider:  Eliceo Bonner MD    Initial Assessment        See Epic Evaluation Start of Care Date: 05/03/23

## 2023-05-03 NOTE — PROGRESS NOTES
05/03/23 1000   General Information   Type of Visit Initial OP Ortho PT Evaluation   Start of Care Date 05/03/23   Referring Physician Eliceo Bonner MD   Patient/Family Goals Statement Regain mobility and strength   Orders Evaluate and Treat   Date of Order 04/18/23   Certification Required? Yes   Medical Diagnosis Strain of lumbar region, initial encounter (S39.012A)   Lumbar paraspinal muscle spasm (M62.830)   Surgical/Medical history reviewed Yes   Precautions/Limitations no known precautions/limitations   Body Part(s)   Body Part(s) Lumbar Spine/SI   Presentation and Etiology   Pertinent history of current problem (include personal factors and/or comorbidities that impact the POC) Pt presents for new patient evaluation of low back pain. Was recently see in the ER twice for concerns related to LBP. First ER visit on 4/18/23 for concerns related to low back strain at work as a  15 days prior lifting a 70 pound piece of equipment. XR reviewed from ER. Seen in ER a few days later for same concerns and had abdominal CT and lab work which were unremarkable, given medications to manage pain at that time. Pt reports that things have gotten a lot better since his most recent ER visit, feels he is 90% better but still feels a slight strain in the low back. Saw chiropractor right before injury as well as had acupuncture, feels the adjustment set his back into a flare. Switched to a new chiroractor who uses the activator and this went much better, now not needing to take any pain meds for the last week. Pain bothered him most with sitting, lying down, and forward bending. Pain was waking him up from sleep, but no longer waking him. Denies radicular leg pain, numbness or tingling, weakness in either leg.   Impairments A. Pain;D. Decreased ROM;E. Decreased flexibility;F. Decreased strength and endurance;M. Locking or catching   Functional Limitations perform activities of daily living   Symptom  Location Left low back   How/Where did it occur While lifting   Onset date of current episode/exacerbation 04/03/23   Chronicity New   Pain rating (0-10 point scale) Best (/10);Worst (/10)   Best (/10) 1   Worst (/10) 10   Pain quality A. Sharp;C. Aching;F. Stabbing   Frequency of pain/symptoms B. Intermittent   Pain/symptoms are: The same all the time   Pain/symptoms exacerbated by A. Sitting;C. Lifting;D. Carrying;G. Certain positions;H. Overhead reach;I. Bending;J. ADL;K. Home tasks;L. Work tasks;M. Other   Pain exacerbation comment Bending forward, putting on socks/shoes, heavy lifting as    Pain/symptoms eased by B. Walking;F. Certain positions;G. Heat;H. Cold;I. OTC medication(s)   Progression of symptoms since onset: Improved   Current / Previous Interventions   Diagnostic Tests: X-ray   X-ray Results Results   X-ray results IMPRESSION: No fracture is identified. Multilevel mild degenerative  disc disease. Mild lower lumbar spine facet arthropathy.   Prior Level of Function   Functional Level Prior Comment Independent with ADLs and mobility   Current Level of Function   Patient role/employment history A. Employed   Employment Comments Full time ;    Fall Risk Screen   Fall screen completed by PT   Have you fallen 2 or more times in the past year? No   Have you fallen and had an injury in the past year? No   Is patient a fall risk? No   Abuse Screen (yes response referral indicated)   Feels Unsafe at Home or Work/School no   Feels Threatened by Someone no   Does Anyone Try to Keep You From Having Contact with Others or Doing Things Outside Your Home? no   Physical Signs of Abuse Present no   Lumbar Spine/SI Objective Findings   Gait/Locomotion Unremarkable, no AD   Balance/Proprioception (Single Leg Stance) not assessed   Hamstring Flexibility Mild tension both sides   Obers Flexibility No significant tension   Piriformis Flexibility No significant tension    Flexion ROM 90% (mild pain)   Extension %   Right Side Bending %   Left Side Bending %   Repeated Flexion-Standing ROM Increased pain, no peripheralization   Lumbar ROM Comment Full lumbar rotation both sides   Pelvic Screen WNL   Hip Screen Hip flexion ROM limited and mildly painful on both sides; IR/ER WNL for age both hips   Transversus Abdominus Strength (Rashi Leg Lowering-deg) Lowered to 45-60* (no pain)   Hip Flexion (L2) Strength 5/5 B   Hip Abduction Strength 4+/5 B   Hip Extension Strength 5/5 B   Knee Flexion Strength 5/5 B   Knee Extension (L3) Strength 5/5 B   Ankle Dorsiflexion (L4) Strength 5/5 B   Ankle Plantar Flexion (S1) Strength 5/5 B   SLR - B   Crossover SLR - B   Segmental Mobility Normal segmental mobility without pain throughout lumbar spine and sacrum   Palpation Mild TTP To L lower paraspinals. No significant TTP to QLs, glutes, piriformis,  R lumbar paraspinals.   Slump Test - (though pt noted pain in slumped position that worsened with head lift)   Observation Pt alert and oriented, no distress.   Integumentary WNL   Posture Unremarkable   Planned Therapy Interventions   Planned Therapy Interventions joint mobilization;manual therapy;neuromuscular re-education;ROM;strengthening;stretching   Planned Modality Interventions   Planned Modality Interventions Biofeedback;Electrical stimulation;TENS;Traction;Ultrasound   Clinical Impression   Criteria for Skilled Therapeutic Interventions Met yes, treatment indicated   PT Diagnosis Low back pain   Influenced by the following impairments Impaired lumbar and hip ROM, impared core stability, muscle imbalance   Functional limitations due to impairments Lifting, household and work duties, prolonged sitting, bending, donning/doffing shoes/socks   Clinical Presentation Stable/Uncomplicated   Clinical Presentation Rationale Symptoms are stable   Clinical Decision Making (Complexity) Low complexity   Therapy Frequency 1  time/week   Predicted Duration of Therapy Intervention (days/wks) 4 weeks   Risk & Benefits of therapy have been explained Yes   Patient, Family & other staff in agreement with plan of care Yes   Clinical Impression Comments Pt presents for new patient evaluation of LBP following a lift of 70# while at work in an awkward position for the low back. Pt with immediate pain resulting in two ER visits with unremarkable XR and CT findings. Pt with nearly 90% improvement since most recent ER visit and no longer needing pain medications. Pt would benefit from skilled PT services to address impairments and functional limitations to  return to PLOF as well as prevent future injuries to low back. Prognosis for therapy is good.   Education Assessment   Preferred Learning Style Listening;Demonstration;Reading;Pictures/video   Barriers to Learning No barriers   ORTHO GOALS   PT Ortho Eval Goals 1;2;3;4   Ortho Goal 1   Goal Identifier 1   Goal Description Pt will be able to sit for 1 hour or greater without increase in low back pain.   Target Date 05/31/23   Ortho Goal 2   Goal Identifier 2   Goal Description Pt will be able to don/doff shoes and socks wtihout limitation due to low back pain to improve ADLs.   Target Date 05/31/23   Ortho Goal 3   Goal Identifier 3   Goal Description Pt will be able to lift 50# or greater from floor<>waist in order to improve job duties as  without limitation due to low back pain.   Target Date 05/31/23   Ortho Goal 4   Goal Identifier 4   Goal Description Pt will be independent with HEP for self-management of  symptoms.   Target Date 05/31/23   Total Evaluation Time   PT Eval, Low Complexity Minutes (21760) 15   Therapy Certification   Certification date from 05/03/23   Certification date to 05/31/23   Medical Diagnosis Strain of lumbar region, initial encounter (S39.012A)   Lumbar paraspinal muscle spasm (M62.830)       Please contact me with any questions or concerns.    Thank you for this referral.     Annita Harding, PT, DPT

## 2023-05-12 NOTE — PROGRESS NOTES
[facility] Rehabilitation [facility]    Outpatient Physical Therapy Discharge Note  Patient: Oral Blount  : 1970    Beginning/End Dates of Reporting Period:  5/3/23 to 23    Referring Provider: Eliceo Bonner MD    Therapy Diagnosis: Low back pain     Client Self Report: Pt presents for  new patient evaluation of LBP. Please see evaluation note. Pt cancelled next follow up, due to improvement in symptoms did not need further PT.    Objective Measurements:  Lumbar Spine/SI Objective Findings   Observation Pt alert and oriented, no distress.       Integumentary WNL       Posture Unremarkable       Gait/Locomotion Unremarkable, no AD       Balance/Proprioception (Single Leg Stance) not assessed       Flexion ROM 90% (mild pain)       Extension %       Right Side Bending %       Left Side Bending %       Repeated Flexion-Standing ROM Increased pain, no peripheralization       Lumbar ROM Comment Full lumbar rotation both sides       Pelvic Screen WNL       Hip Screen Hip flexion ROM limited and mildly painful on both sides; IR/ER WNL for age both hips       Transversus Abdominus Strength (Rashi Leg Lowering-deg) Lowered to 45-60* (no pain)       Hip Flexion (L2) Strength 5/5 B       Hip Abduction Strength 4+/5 B       Hip Extension Strength 5/5 B       Knee Flexion Strength 5/5 B       Knee Extension (L3) Strength 5/5 B       Ankle Dorsiflexion (L4) Strength 5/5 B       Ankle Plantar Flexion (S1) Strength 5/5 B       Hamstring Flexibility Mild tension both sides       Obers Flexibility No significant tension       Piriformis Flexibility No significant tension       SLR - B       Crossover SLR - B       Slump Test - (though pt noted pain in slumped position that worsened with head lift)       Segmental Mobility Normal segmental mobility without pain throughout lumbar spine and sacrum       Palpation  Mild TTP To L lower paraspinals. No significant TTP to QLs, glutes, piriformis,  R lumbar paraspinals.         Goals:  Goal Identifier 1   Goal Description Pt will be able to sit for 1 hour or greater without increase in low back pain.   Target Date 05/31/23   Date Met      Progress (detail required for progress note):  uncertain     Goal Identifier 2   Goal Description Pt will be able to don/doff shoes and socks wtihout limitation due to low back pain to improve ADLs.   Target Date 05/31/23   Date Met      Progress (detail required for progress note):  uncertain     Goal Identifier 3   Goal Description Pt will be able to lift 50# or greater from floor<>waist in order to improve job duties as  without limitation due to low back pain.   Target Date 05/31/23   Date Met      Progress (detail required for progress note):  uncertain     Goal Identifier 4   Goal Description Pt will be independent with HEP for self-management of  symptoms.   Target Date 05/31/23   Date Met   05/31/23   Progress (detail required for progress note):  MET     Plan:  Discharge from therapy.    Discharge:    Reason for Discharge: Patient has failed to schedule further appointments.    Discharge Plan: Patient to continue home program.    Please contact me with any questions or concerns.   Thank you for this referral.     Annita Harding, PT, DPT

## 2023-05-15 ENCOUNTER — PATIENT OUTREACH (OUTPATIENT)
Dept: CARE COORDINATION | Facility: CLINIC | Age: 53
End: 2023-05-15
Payer: COMMERCIAL

## 2023-05-30 ENCOUNTER — PATIENT OUTREACH (OUTPATIENT)
Dept: CARE COORDINATION | Facility: CLINIC | Age: 53
End: 2023-05-30
Payer: COMMERCIAL

## 2023-09-08 ENCOUNTER — ANCILLARY PROCEDURE (OUTPATIENT)
Dept: GENERAL RADIOLOGY | Facility: CLINIC | Age: 53
End: 2023-09-08
Attending: PEDIATRICS
Payer: COMMERCIAL

## 2023-09-08 ENCOUNTER — OFFICE VISIT (OUTPATIENT)
Dept: ORTHOPEDICS | Facility: CLINIC | Age: 53
End: 2023-09-08
Payer: COMMERCIAL

## 2023-09-08 VITALS
WEIGHT: 205 LBS | BODY MASS INDEX: 30.27 KG/M2 | DIASTOLIC BLOOD PRESSURE: 100 MMHG | SYSTOLIC BLOOD PRESSURE: 146 MMHG | HEART RATE: 73 BPM

## 2023-09-08 DIAGNOSIS — M25.512 CHRONIC LEFT SHOULDER PAIN: ICD-10-CM

## 2023-09-08 DIAGNOSIS — M19.012 ARTHRITIS OF LEFT ACROMIOCLAVICULAR JOINT: ICD-10-CM

## 2023-09-08 DIAGNOSIS — G89.29 CHRONIC LEFT SHOULDER PAIN: Primary | ICD-10-CM

## 2023-09-08 DIAGNOSIS — M25.512 CHRONIC LEFT SHOULDER PAIN: Primary | ICD-10-CM

## 2023-09-08 DIAGNOSIS — M25.812 IMPINGEMENT OF LEFT SHOULDER: ICD-10-CM

## 2023-09-08 DIAGNOSIS — G89.29 CHRONIC LEFT SHOULDER PAIN: ICD-10-CM

## 2023-09-08 PROCEDURE — 73030 X-RAY EXAM OF SHOULDER: CPT | Mod: TC | Performed by: RADIOLOGY

## 2023-09-08 PROCEDURE — 99204 OFFICE O/P NEW MOD 45 MIN: CPT | Mod: 25 | Performed by: PEDIATRICS

## 2023-09-08 PROCEDURE — 20610 DRAIN/INJ JOINT/BURSA W/O US: CPT | Mod: LT | Performed by: PEDIATRICS

## 2023-09-08 RX ORDER — TRIAMCINOLONE ACETONIDE 40 MG/ML
40 INJECTION, SUSPENSION INTRA-ARTICULAR; INTRAMUSCULAR
Status: SHIPPED | OUTPATIENT
Start: 2023-09-08

## 2023-09-08 RX ORDER — LIDOCAINE HYDROCHLORIDE 10 MG/ML
2 INJECTION, SOLUTION INFILTRATION; PERINEURAL
Status: SHIPPED | OUTPATIENT
Start: 2023-09-08

## 2023-09-08 RX ADMIN — LIDOCAINE HYDROCHLORIDE 2 ML: 10 INJECTION, SOLUTION INFILTRATION; PERINEURAL at 12:07

## 2023-09-08 RX ADMIN — TRIAMCINOLONE ACETONIDE 40 MG: 40 INJECTION, SUSPENSION INTRA-ARTICULAR; INTRAMUSCULAR at 12:07

## 2023-09-08 NOTE — LETTER
9/8/2023         RE: Oral Blount  [address] [facility] [address]        Dear Colleague,    Thank you for referring your patient, Oral Blount, to the [facility] [place]. Please see a copy of my visit note below.    ASSESSMENT & PLAN    Oral was seen today for pain.    Diagnoses and all orders for this visit:    Chronic left shoulder pain  -     XR Shoulder Left G/E 3 Views; Future  -     Physical Therapy Referral; Future    Impingement of left shoulder  -     Physical Therapy Referral; Future    Arthritis of left acromioclavicular joint  -     Physical Therapy Referral; Future    Other orders  -     Large Joint Injection/Arthocentesis: L subacromial bursa      This issue is chronic and Unchanged.      ICD-10-CM    1. Chronic left shoulder pain  M25.512 XR Shoulder Left G/E 3 Views    G89.29 Physical Therapy Referral      2. Impingement of left shoulder  M25.812 Physical Therapy Referral      3. Arthritis of left acromioclavicular joint  M19.012 Physical Therapy Referral        Patient Instructions   Low suspicion for rotator cuff tear given current history and exam.  Recommended rest from irritating activities coupled with physical therapy.  Would consider further imaging or treatment pending clinical course.    Patient request a cortisone injection today.  Reviewed the potential diagnosis (partial rotator cuff tear, rotator cuff tendinosis or weakness) and discussed the utility of corticosteroid injections (pain relief only).  Stressed the importance of physical therapy to strengthen shoulder and increased liklihood of pain returning if injection is not coupled with therapy.    Plan:  - Today's Plan of Care:  Rehab: Physical Therapy: [facility] - [phone]  Steroid injection of the left shoulder: subacromial space was performed today in clinic  Icing for the next 1-2 days may be helpful for pain. Injection may take 10-14 days to see the full  effect.    Discussed activity considerations and other supportive care including Ice/Heat, OTC and other topical medications as needed.    -We also discussed other future treatment options:  MRI left shoulder    Follow Up: 6 - 8 weeks and as needed    Concerning signs and symptoms were reviewed and all questions were answered at this time.    Astrid Davila MD [facility]  Sports Medicine Physician  [facility]      -----  Chief Complaint   Patient presents with     Left Shoulder - Pain       SUBJECTIVE  Oral Blount is a/an 52 year old male who is seen as a self referral for evaluation of left shoulder pain.     The patient is seen by themselves.  The patient is Right handed    Onset: chronic injury, 2 month(s) ago started bothering him again. Patient describes injury as golfing, softball  Location of Pain: left shoulder; AC joint   Worsened by: lifting overhead, grabbing out in front, picking up  Better with: resting  Treatments tried: Tylenol and ibuprofen  Associated symptoms: swelling, numbness, weakness of left shoulder, feeling of instability, and burning sensation, with certain positions, desk    Orthopedic/Surgical history: YES - Date: AC joint pain   Social History/Occupation: working at a desk mostly      REVIEW OF SYSTEMS:  Review of Systems    OBJECTIVE:  BP (!) 146/100   Pulse 73   Wt 93 kg (205 lb)   BMI 30.27 kg/m     General: healthy, alert and in no distress  Skin: no suspicious lesions or rash.  CV: distal perfusion intact   Resp: normal respiratory effort without conversational dyspnea   Psych: normal mood and affect  Gait: NORMAL  Neuro: Normal light sensory exam of upper extremity    Bilateral Shoulder exam  Inspection and Posture:       normal    Skin:        no visible deformities    Tender:        None currently    Non Tender:       remainder of shoulder bilateral    ROM:        Full active and passive ROM with flexion, extension, abduction, internal and external rotation  bilateral       asymmetric scapular motion    Painful motions:       end range flexion and elevation left    Strength:        abduction 5/5 bilateral       flexion 5/5 bilateral       internal rotation 5/5 bilateral       external rotation 5/5 bilateral    Impingement testing:       positive (+) Neer left       positive (+) Mueller left       neg (-) crossover left    Sensation:        normal sensation over shoulder and upper extremity       RADIOLOGY:  Final results and radiologist's interpretation, available in the [facility] health record.  Images were reviewed with the patient in the office today.  My personal interpretation of the performed imaging:     3 XR views of left shoulder reviewed: no acute bony abnormality, AC joint degenerative change  - will follow official read      Review of the result(s) of each unique test - XR      Large Joint Injection/Arthocentesis: L subacromial bursa    Date/Time: 9/8/2023 12:07 PM    Performed by: Astrid Davila MD  Authorized by: Astrid Davila MD    Indications:  Pain  Needle Size:  25 G  Guidance: landmark guided    Approach:  Posterior  Location:  Shoulder      Site:  L subacromial bursa  Medications:  2 mL lidocaine 1 %; 40 mg triamcinolone 40 MG/ML  Outcome:  Tolerated well, no immediate complications  Procedure discussed: discussed risks, benefits, and alternatives    Consent Given by:  Patient  Timeout: timeout called immediately prior to procedure    Prep: patient was prepped and draped in usual sterile fashion     The risks, benefits and complications of steroid injection were discussed with the patient (including but not limited to: bleeding, infection, pain, scar, damage to adjacent structures, atrophy or necrosis of soft tissue, skin blanching, failure to relieve symptoms, worsening of symptoms, allergic reaction). After this discussion all questions were addressed and answered and the patient elected to proceed. The patient tolerated the procedure well without  complications.  Also discussed that if diabetic, recommend close monitoring of blood sugars over the next week as cortisone injections can temporarily elevate blood sugars.             Again, thank you for allowing me to participate in the care of your patient.        Sincerely,        Astrid Davila MD

## 2023-09-08 NOTE — PROGRESS NOTES
ASSESSMENT & PLAN    Oral was seen today for pain.    Diagnoses and all orders for this visit:    Chronic left shoulder pain  -     XR Shoulder Left G/E 3 Views; Future  -     Physical Therapy Referral; Future    Impingement of left shoulder  -     Physical Therapy Referral; Future    Arthritis of left acromioclavicular joint  -     Physical Therapy Referral; Future    Other orders  -     Large Joint Injection/Arthocentesis: L subacromial bursa      This issue is chronic and Unchanged.      ICD-10-CM    1. Chronic left shoulder pain  M25.512 XR Shoulder Left G/E 3 Views    G89.29 Physical Therapy Referral      2. Impingement of left shoulder  M25.812 Physical Therapy Referral      3. Arthritis of left acromioclavicular joint  M19.012 Physical Therapy Referral        Patient Instructions   Low suspicion for rotator cuff tear given current history and exam.  Recommended rest from irritating activities coupled with physical therapy.  Would consider further imaging or treatment pending clinical course.    Patient request a cortisone injection today.  Reviewed the potential diagnosis (partial rotator cuff tear, rotator cuff tendinosis or weakness) and discussed the utility of corticosteroid injections (pain relief only).  Stressed the importance of physical therapy to strengthen shoulder and increased liklihood of pain returning if injection is not coupled with therapy.    Plan:  - Today's Plan of Care:  Rehab: Physical Therapy: [facility] - [phone]  Steroid injection of the left shoulder: subacromial space was performed today in clinic  Icing for the next 1-2 days may be helpful for pain. Injection may take 10-14 days to see the full effect.    Discussed activity considerations and other supportive care including Ice/Heat, OTC and other topical medications as needed.    -We also discussed other future treatment options:  MRI left shoulder    Follow Up: 6 - 8 weeks and as needed    Concerning signs and  symptoms were reviewed and all questions were answered at this time.    Astrid Davila MD [facility]  Sports Medicine Physician  [facility]      -----  Chief Complaint   Patient presents with    Left Shoulder - Pain       SUBJECTIVE  Oral Blount is a/an 52 year old male who is seen as a self referral for evaluation of left shoulder pain.     The patient is seen by themselves.  The patient is Right handed    Onset: chronic injury, 2 month(s) ago started bothering him again. Patient describes injury as golfing, softball  Location of Pain: left shoulder; AC joint   Worsened by: lifting overhead, grabbing out in front, picking up  Better with: resting  Treatments tried: Tylenol and ibuprofen  Associated symptoms: swelling, numbness, weakness of left shoulder, feeling of instability, and burning sensation, with certain positions, desk    Orthopedic/Surgical history: YES - Date: AC joint pain   Social History/Occupation: working at a desk mostly      REVIEW OF SYSTEMS:  Review of Systems    OBJECTIVE:  BP (!) 146/100   Pulse 73   Wt 93 kg (205 lb)   BMI 30.27 kg/m     General: healthy, alert and in no distress  Skin: no suspicious lesions or rash.  CV: distal perfusion intact   Resp: normal respiratory effort without conversational dyspnea   Psych: normal mood and affect  Gait: NORMAL  Neuro: Normal light sensory exam of upper extremity    Bilateral Shoulder exam  Inspection and Posture:       normal    Skin:        no visible deformities    Tender:        None currently    Non Tender:       remainder of shoulder bilateral    ROM:        Full active and passive ROM with flexion, extension, abduction, internal and external rotation bilateral       asymmetric scapular motion    Painful motions:       end range flexion and elevation left    Strength:        abduction 5/5 bilateral       flexion 5/5 bilateral       internal rotation 5/5 bilateral       external rotation 5/5 bilateral    Impingement  testing:       positive (+) Neer left       positive (+) Mueller left       neg (-) crossover left    Sensation:        normal sensation over shoulder and upper extremity       RADIOLOGY:  Final results and radiologist's interpretation, available in the [facility] health record.  Images were reviewed with the patient in the office today.  My personal interpretation of the performed imaging:     3 XR views of left shoulder reviewed: no acute bony abnormality, AC joint degenerative change  - will follow official read      Review of the result(s) of each unique test - XR      Large Joint Injection/Arthocentesis: L subacromial bursa    Date/Time: 9/8/2023 12:07 PM    Performed by: Astrid Davila MD  Authorized by: Astrid Davila MD    Indications:  Pain  Needle Size:  25 G  Guidance: landmark guided    Approach:  Posterior  Location:  Shoulder      Site:  L subacromial bursa  Medications:  2 mL lidocaine 1 %; 40 mg triamcinolone 40 MG/ML  Outcome:  Tolerated well, no immediate complications  Procedure discussed: discussed risks, benefits, and alternatives    Consent Given by:  Patient  Timeout: timeout called immediately prior to procedure    Prep: patient was prepped and draped in usual sterile fashion     The risks, benefits and complications of steroid injection were discussed with the patient (including but not limited to: bleeding, infection, pain, scar, damage to adjacent structures, atrophy or necrosis of soft tissue, skin blanching, failure to relieve symptoms, worsening of symptoms, allergic reaction). After this discussion all questions were addressed and answered and the patient elected to proceed. The patient tolerated the procedure well without complications.  Also discussed that if diabetic, recommend close monitoring of blood sugars over the next week as cortisone injections can temporarily elevate blood sugars.

## 2023-09-08 NOTE — PATIENT INSTRUCTIONS
Low suspicion for rotator cuff tear given current history and exam.  Recommended rest from irritating activities coupled with physical therapy.  Would consider further imaging or treatment pending clinical course.    Patient request a cortisone injection today.  Reviewed the potential diagnosis (partial rotator cuff tear, rotator cuff tendinosis or weakness) and discussed the utility of corticosteroid injections (pain relief only).  Stressed the importance of physical therapy to strengthen shoulder and increased liklihood of pain returning if injection is not coupled with therapy.    Plan:  - Today's Plan of Care:  Rehab: [facility]: [facility] - [phone]  Steroid injection of the left shoulder: subacromial space was performed today in clinic  Icing for the next 1-2 days may be helpful for pain. Injection may take 10-14 days to see the full effect.    Discussed activity considerations and other supportive care including Ice/Heat, OTC and other topical medications as needed.    -We also discussed other future treatment options:  MRI left shoulder    Follow Up: 6 - 8 weeks and as needed    If you have any further questions for your physician or physician s care team you can call [phone] and use option 3 to leave a voice message.     After the Injection     After the injection, strenuous and repetitive activity should be minimized for approximately 48 hours.   Ice should be applied to the injected area at least for the next 48 hours.   Apply ice to the injected area at least 3 - 4 times a day for 20 minutes each time for the next 48 hours. This can reduce the painful  flare  reaction that can follow an injection the next day. This reaction can cause the area that was injected to hurt more the next day just from the injection. This will resolve within a day if it does occur.     Use over-the-counter pain medications such as Tylenol to help with the pain if necessary.     After 48 hours, icing  the area may be continued if you find it beneficial.     The lidocaine or marcaine (commonly called Novocain) is an anesthetic agent that is injected with the steroid will typically relieve your pain for a few hours following the injection. If the  Novocain  and steroid are injected near a nerve, you may experience local numbness or weakness from the nerve block until it wears off. After this wears off your pain may return until the steroid takes effect.   The steroid may be effective immediately after the injection. Do not be concerned if the injection is not effective in relieving your symptoms immediately. In some cases, it may take up to two weeks for the steroid to work.   If you are diabetic, the corticosteroid may cause your blood sugar to become elevated for several days following the injection. This response usually lasts about 2-4 days before it returns to your normal level.   You should report any adverse reaction to you doctor. Call if there are any questions.

## 2023-09-10 ENCOUNTER — HEALTH MAINTENANCE LETTER (OUTPATIENT)
Age: 53
End: 2023-09-10

## 2023-11-04 ENCOUNTER — HOSPITAL ENCOUNTER (EMERGENCY)
Facility: CLINIC | Age: 53
Discharge: HOME | End: 2023-11-04
Attending: FAMILY MEDICINE | Admitting: FAMILY MEDICINE
Payer: COMMERCIAL

## 2023-11-04 VITALS
BODY MASS INDEX: 30.36 KG/M2 | WEIGHT: 205 LBS | SYSTOLIC BLOOD PRESSURE: 131 MMHG | RESPIRATION RATE: 18 BRPM | OXYGEN SATURATION: 95 % | HEART RATE: 77 BPM | HEIGHT: 69 IN | TEMPERATURE: 99.2 F | DIASTOLIC BLOOD PRESSURE: 90 MMHG

## 2023-11-04 DIAGNOSIS — L03.116 CELLULITIS OF LEFT THIGH: ICD-10-CM

## 2023-11-04 LAB
ALBUMIN SERPL BCG-MCNC: 4.2 G/DL (ref 3.5–5.2)
ALP SERPL-CCNC: 61 U/L (ref 40–129)
ALT SERPL W P-5'-P-CCNC: 28 U/L (ref 0–70)
ANION GAP SERPL CALCULATED.3IONS-SCNC: 12 MMOL/L (ref 7–15)
AST SERPL W P-5'-P-CCNC: 21 U/L (ref 0–45)
BASOPHILS # BLD AUTO: 0 10E3/UL (ref 0–0.2)
BASOPHILS NFR BLD AUTO: 0 %
BILIRUB SERPL-MCNC: 0.4 MG/DL
BUN SERPL-MCNC: 14.1 MG/DL (ref 6–20)
CALCIUM SERPL-MCNC: 8.9 MG/DL (ref 8.6–10)
CHLORIDE SERPL-SCNC: 106 MMOL/L (ref 98–107)
CREAT SERPL-MCNC: 1.16 MG/DL (ref 0.67–1.17)
DEPRECATED HCO3 PLAS-SCNC: 22 MMOL/L (ref 22–29)
EGFRCR SERPLBLD CKD-EPI 2021: 76 ML/MIN/1.73M2
EOSINOPHIL # BLD AUTO: 0.1 10E3/UL (ref 0–0.7)
EOSINOPHIL NFR BLD AUTO: 1 %
ERYTHROCYTE [DISTWIDTH] IN BLOOD BY AUTOMATED COUNT: 12.1 % (ref 10–15)
GLUCOSE SERPL-MCNC: 110 MG/DL (ref 70–99)
HCT VFR BLD AUTO: 40.5 % (ref 40–53)
HGB BLD-MCNC: 14.6 G/DL (ref 13.3–17.7)
IMM GRANULOCYTES # BLD: 0 10E3/UL
IMM GRANULOCYTES NFR BLD: 0 %
LYMPHOCYTES # BLD AUTO: 1.2 10E3/UL (ref 0.8–5.3)
LYMPHOCYTES NFR BLD AUTO: 14 %
MCH RBC QN AUTO: 32.2 PG (ref 26.5–33)
MCHC RBC AUTO-ENTMCNC: 36 G/DL (ref 31.5–36.5)
MCV RBC AUTO: 89 FL (ref 78–100)
MONOCYTES # BLD AUTO: 1 10E3/UL (ref 0–1.3)
MONOCYTES NFR BLD AUTO: 11 %
MRSA DNA SPEC QL NAA+PROBE: NEGATIVE
NEUTROPHILS # BLD AUTO: 6.5 10E3/UL (ref 1.6–8.3)
NEUTROPHILS NFR BLD AUTO: 74 %
NRBC # BLD AUTO: 0 10E3/UL
NRBC BLD AUTO-RTO: 0 /100
PLATELET # BLD AUTO: 205 10E3/UL (ref 150–450)
POTASSIUM SERPL-SCNC: 4 MMOL/L (ref 3.4–5.3)
PROT SERPL-MCNC: 6.7 G/DL (ref 6.4–8.3)
RBC # BLD AUTO: 4.53 10E6/UL (ref 4.4–5.9)
SA TARGET DNA: NEGATIVE
SODIUM SERPL-SCNC: 140 MMOL/L (ref 135–145)
WBC # BLD AUTO: 8.8 10E3/UL (ref 4–11)

## 2023-11-04 PROCEDURE — 87641 MR-STAPH DNA AMP PROBE: CPT | Performed by: FAMILY MEDICINE

## 2023-11-04 PROCEDURE — 80053 COMPREHEN METABOLIC PANEL: CPT | Performed by: FAMILY MEDICINE

## 2023-11-04 PROCEDURE — 96365 THER/PROPH/DIAG IV INF INIT: CPT | Performed by: FAMILY MEDICINE

## 2023-11-04 PROCEDURE — 250N000011 HC RX IP 250 OP 636: Mod: JZ | Performed by: FAMILY MEDICINE

## 2023-11-04 PROCEDURE — 87070 CULTURE OTHR SPECIMN AEROBIC: CPT | Performed by: FAMILY MEDICINE

## 2023-11-04 PROCEDURE — 86618 LYME DISEASE ANTIBODY: CPT | Performed by: FAMILY MEDICINE

## 2023-11-04 PROCEDURE — 36415 COLL VENOUS BLD VENIPUNCTURE: CPT | Performed by: FAMILY MEDICINE

## 2023-11-04 PROCEDURE — 99284 EMERGENCY DEPT VISIT MOD MDM: CPT | Mod: 25 | Performed by: FAMILY MEDICINE

## 2023-11-04 PROCEDURE — 85025 COMPLETE CBC W/AUTO DIFF WBC: CPT | Performed by: FAMILY MEDICINE

## 2023-11-04 PROCEDURE — 250N000013 HC RX MED GY IP 250 OP 250 PS 637: Performed by: FAMILY MEDICINE

## 2023-11-04 RX ORDER — SULFAMETHOXAZOLE/TRIMETHOPRIM 800-160 MG
1 TABLET ORAL 2 TIMES DAILY
Qty: 14 TABLET | Refills: 0 | Status: SHIPPED | OUTPATIENT
Start: 2023-11-04 | End: 2023-11-11

## 2023-11-04 RX ORDER — CEPHALEXIN 500 MG/1
500 CAPSULE ORAL 4 TIMES DAILY
Qty: 28 CAPSULE | Refills: 0 | Status: SHIPPED | OUTPATIENT
Start: 2023-11-04 | End: 2023-11-11

## 2023-11-04 RX ORDER — CEFAZOLIN SODIUM 2 G/100ML
2 INJECTION, SOLUTION INTRAVENOUS ONCE
Status: COMPLETED | OUTPATIENT
Start: 2023-11-04 | End: 2023-11-04

## 2023-11-04 RX ORDER — SULFAMETHOXAZOLE/TRIMETHOPRIM 800-160 MG
1 TABLET ORAL ONCE
Status: COMPLETED | OUTPATIENT
Start: 2023-11-04 | End: 2023-11-04

## 2023-11-04 RX ORDER — ACETAMINOPHEN 500 MG
1000 TABLET ORAL ONCE
Status: COMPLETED | OUTPATIENT
Start: 2023-11-04 | End: 2023-11-04

## 2023-11-04 RX ADMIN — SULFAMETHOXAZOLE AND TRIMETHOPRIM 1 TABLET: 800; 160 TABLET ORAL at 21:31

## 2023-11-04 RX ADMIN — ACETAMINOPHEN 1000 MG: 500 TABLET, FILM COATED ORAL at 20:41

## 2023-11-04 RX ADMIN — CEFAZOLIN SODIUM 2 G: 2 INJECTION, SOLUTION INTRAVENOUS at 20:44

## 2023-11-04 ASSESSMENT — ACTIVITIES OF DAILY LIVING (ADL): ADLS_ACUITY_SCORE: 35

## 2023-11-05 NOTE — ED PROVIDER NOTES
History     Chief Complaint   Patient presents with    Wound Check     HPI    Oral Blount is a 52 year old male who comes in with generalized myalgias, low-grade fevers and sores on his arms and legs.  This began about 3 days ago.  It began on his left thigh with a pimple-like sore at that developed surrounding erythema which is continued to spread and then he developed another 1 on the right hip and then on the left arm as in the photos below.  In June 2022 he was here with what was diagnosed as facial cellulitis treated with antibiotics.  There were no laboratory studies at the time.  He has not had any tick exposures that he is aware of.  He is not having any respiratory symptoms or abdominal pain.  He is treated with amlodipine for hypertension but does not have any other medical problems.    Allergies:  Allergies   Allergen Reactions    Lisinopril Cough       Problem List:    Patient Active Problem List    Diagnosis Date Noted    Non morbid obesity, unspecified obesity type 11/05/2018     Priority: Medium    Hypertension, unspecified type 10/31/2018     Priority: Medium    Gout 09/21/2011     Priority: Medium    LUQ abdominal pain 06/17/2008     Priority: Medium     relation w food -suspect gastric/gerd issue - vs constipation.       Left JOINT PAIN-SHLDER 12/08/2007     Priority: Medium     brief exam - suspicious for overuse injury - PT .  xray appears wnl.      Allergic rhinitis      Priority: Medium     otc   Problem list name updated by automated process. Provider to review          Past Medical History:    Past Medical History:   Diagnosis Date    Allergic rhinitis, cause unspecified     Basal cell carcinoma     Tobacco use disorder        Past Surgical History:    Past Surgical History:   Procedure Laterality Date    ESOPHAGOSCOPY, GASTROSCOPY, DUODENOSCOPY (EGD), COMBINED N/A 2/24/2023    Procedure: ESOPHAGOGASTRODUODENOSCOPY, WITH BIOPSY;  Surgeon: Jose Alberto Jane MD;  Location: [place] [facility]    SURGICAL  "HISTORY OF -   2003    vasectomy       Family History:    Family History   Problem Relation Age of Onset    Eye Disorder Mother         degenerative    Hypertension Father     Diabetes Father     Cancer Father         BCC    Pacemaker Father     Cancer Paternal Grandmother         lung    Cancer Paternal Grandfather         lung    Hypertension Brother     Prostate Problems Brother     Hypertension Brother     Diverticulitis Sister     Melanoma No family hx of        Social History:  Marital Status:   [2]  Social History     Tobacco Use    Smoking status: Never    Smokeless tobacco: Never   Vaping Use    Vaping Use: Never used   Substance Use Topics    Alcohol use: No    Drug use: No        Medications:    cephALEXin (KEFLEX) 500 MG capsule  sulfamethoxazole-trimethoprim (BACTRIM DS) 800-160 MG tablet  amLODIPine (NORVASC) 5 MG tablet  cyclobenzaprine (FLEXERIL) 10 MG tablet  oxyCODONE (ROXICODONE) 5 MG tablet  oxyCODONE-acetaminophen (PERCOCET) 5-325 MG tablet  valACYclovir (VALTREX) 1000 mg tablet          Review of Systems  All other systems are reviewed and are negative    Physical Exam   BP: 133/86  Pulse: 97  Temp: 99.1  F (37.3  C)  Height: 175.3 cm (5' 9\")  Weight: 93 kg (205 lb)  SpO2: 96 %      Physical Exam    Nursing note and vitals were reviewed.  Constitutional: Awake and alert, adequately nourished and developed appearing 52-year-old in no apparent discomfort, who appears moderately ill but nontoxic, and who answers questions appropriately and cooperates with examination.  HEENT: Voice quality is normal.  PERRL.  EOMI.   Neck: Freely mobile.  Cardiovascular: Cardiac examination reveals normal heart rate and regular rhythm without murmur.  Pulmonary/Chest: Breathing is unlabored.  Breath sounds are clear and equal bilaterally.  There no retractions, tachypnea, rales, wheezes, or rhonchi.  Abdomen: Soft, nontender, no HSM or masses rebound or guarding.  Musculoskeletal: Extremities are warm and " well-perfused and without edema  Neurological: Alert, oriented, thought content logical, coherent   Skin: Warm, dry, no rashes.  Psychiatric: Affect broad and appropriate.                ED Course                 Procedures              Critical Care time:  none               Results for orders placed or performed during the hospital encounter of 11/04/23 (from the past 24 hour(s))   CBC with platelets differential    Narrative    The following orders were created for panel order CBC with platelets differential.  Procedure                               Abnormality         Status                     ---------                               -----------         ------                     CBC with platelets and d...[[id number]]                      Final result                 Please view results for these tests on the individual orders.   Comprehensive metabolic panel   Result Value Ref Range    Sodium 140 135 - 145 mmol/L    Potassium 4.0 3.4 - 5.3 mmol/L    Carbon Dioxide (CO2) 22 22 - 29 mmol/L    Anion Gap 12 7 - 15 mmol/L    Urea Nitrogen 14.1 6.0 - 20.0 mg/dL    Creatinine 1.16 0.67 - 1.17 mg/dL    GFR Estimate 76 >60 mL/min/1.73m2    Calcium 8.9 8.6 - 10.0 mg/dL    Chloride 106 98 - 107 mmol/L    Glucose 110 (H) 70 - 99 mg/dL    Alkaline Phosphatase 61 40 - 129 U/L    AST 21 0 - 45 U/L    ALT 28 0 - 70 U/L    Protein Total 6.7 6.4 - 8.3 g/dL    Albumin 4.2 3.5 - 5.2 g/dL    Bilirubin Total 0.4 <=1.2 mg/dL   CBC with platelets and differential   Result Value Ref Range    WBC Count 8.8 4.0 - 11.0 10e3/uL    RBC Count 4.53 4.40 - 5.90 10e6/uL    Hemoglobin 14.6 13.3 - 17.7 g/dL    Hematocrit 40.5 40.0 - 53.0 %    MCV 89 78 - 100 fL    MCH 32.2 26.5 - 33.0 pg    MCHC 36.0 31.5 - 36.5 g/dL    RDW 12.1 10.0 - 15.0 %    Platelet Count 205 150 - 450 10e3/uL    % Neutrophils 74 %    % Lymphocytes 14 %    % Monocytes 11 %    % Eosinophils 1 %    % Basophils 0 %    % Immature Granulocytes 0 %    NRBCs per 100 WBC 0 <1 /100     Absolute Neutrophils 6.5 1.6 - 8.3 10e3/uL    Absolute Lymphocytes 1.2 0.8 - 5.3 10e3/uL    Absolute Monocytes 1.0 0.0 - 1.3 10e3/uL    Absolute Eosinophils 0.1 0.0 - 0.7 10e3/uL    Absolute Basophils 0.0 0.0 - 0.2 10e3/uL    Absolute Immature Granulocytes 0.0 <=0.4 10e3/uL    Absolute NRBCs 0.0 10e3/uL       Medications   sulfamethoxazole-trimethoprim (BACTRIM DS) 800-160 MG per tablet 1 tablet (has no administration in time range)   ceFAZolin (ANCEF) 2 g in 100 mL D5W intermittent infusion (0 g Intravenous Stopped 11/4/23 2116)   acetaminophen (TYLENOL) tablet 1,000 mg (1,000 mg Oral $Given 11/4/23 2041)       Assessments & Plan (with Medical Decision Making)     52-year-old male presented with pain redness and swelling on the left thigh spreading to the right buttock and left arm as described and photographed above.  Symptoms are consistent with cellulitis.  The eschar like wound in the center of these is suggestive of staph infection as is the spreading nature of it.  The fact that he had a similar episode on his face in June 2022 raises the suspicion that he is exposed to a staph carrier either himself or a close family member.  I have sent a nasal swab PCR for staph testing.  I have unroofed the scar on the left thigh and swabbed it and sent it for routine aerobic wound culture.  We have initiated therapy with Ancef 2 g IV in the ED and an oral dose of double strength trimethoprim/sulfamethoxazole.  He will continue on the TMP SMZ twice daily for 7 days as well as cephalexin 500 mg 4 times daily for 7 days.  He is advised to rest for 2 days and be seen if he is not improved in 48 hours or worsening at any time with development of fevers, spreading rash, pain, or other worrisome symptoms.  Have asked him to schedule follow-up with his primary care physician to review the test results and in particular the nasal swab PCR.  Decision could then be made about whether it is worth trying to eradicate the carrier  state if present or whether to test family members.  He expressed understanding and his questions were answered.    I have reviewed the nursing notes.    I have reviewed the findings, diagnosis, plan and need for follow up with the patient.           New Prescriptions    CEPHALEXIN (KEFLEX) 500 MG CAPSULE    Take 1 capsule (500 mg) by mouth 4 times daily for 7 days    SULFAMETHOXAZOLE-TRIMETHOPRIM (BACTRIM DS) 800-160 MG TABLET    Take 1 tablet by mouth 2 times daily for 7 days       Final diagnoses:   Cellulitis of left thigh       11/4/2023   [facility] EMERGENCY DEPT       Minh Nichols MD  11/04/23 4904

## 2023-11-05 NOTE — ED TRIAGE NOTES
"Pt c/o \"several sores on arms and legs with left leg being the worst.\"  Low grade fevers.     Triage Assessment (Adult)       Row Name 11/04/23 1931          Triage Assessment    Airway WDL WDL        Respiratory WDL    Respiratory WDL WDL        Skin Circulation/Temperature WDL    Skin Circulation/Temperature WDL WDL        Cardiac WDL    Cardiac WDL WDL        Peripheral/Neurovascular WDL    Peripheral Neurovascular WDL WDL        Cognitive/Neuro/Behavioral WDL    Cognitive/Neuro/Behavioral WDL WDL                     "

## 2023-11-05 NOTE — RESULT ENCOUNTER NOTE
[facility] Emergency Dept discharge antibiotic prescribed: [1] Cephalexin (Keflex) 500 mg capsule, 1 capsule (500 mg) by mouth 4 times daily for 7 days AND [2] Sulfamethoxazole-Trimethoprim (Bactrim DS, Septra DS) 800-160 mg PO tablet,  1 tablet by mouth 2 times daily for 7 days.  Incision and Drainage performed in [facility] Emergency Dept [Yes or No]:  No but scar unroofed  Recommendations in treatment per [facility] ED Lab Result culture protocol

## 2023-11-05 NOTE — DISCHARGE INSTRUCTIONS
Take trimethoprim/sulfamethoxazole, 1 tablet twice daily for 7 days.  Take cephalexin 500 mg 4 times daily for 7 days.  Rest for 2 days.  Return to be seen if the symptoms are worsening with spreading redness or if you develop fevers greater than 101 or other worrisome symptoms  Return to be seen if you are not improved in 48 hours.  Schedule follow-up with your primary care physician in 1 to 2 weeks to review your test results and determine if you are a staph carrier.

## 2023-11-06 LAB — B BURGDOR IGG+IGM SER QL: 0.03

## 2023-11-07 ENCOUNTER — TELEPHONE (OUTPATIENT)
Dept: EMERGENCY MEDICINE | Facility: CLINIC | Age: 53
End: 2023-11-07
Payer: COMMERCIAL

## 2023-11-07 ENCOUNTER — PATIENT OUTREACH (OUTPATIENT)
Dept: CARE COORDINATION | Facility: CLINIC | Age: 53
End: 2023-11-07
Payer: COMMERCIAL

## 2023-11-07 LAB — BACTERIA WND CULT: ABNORMAL

## 2023-11-07 NOTE — TELEPHONE ENCOUNTER
"[facility] ([place]) Emergency Department/Urgent Care Lab result notification  [Note:  ED Lab Results RN will reference the [facility] Emergency Dept visit note prior to contacting patient AND/OR prior to consulting Emergency Dept Provider.  Highlights of Emergency Dept visit in information summary at the bottom of this telephone note]    1. Reason for call  Notify of lab results  Assess patient symptoms [if necessary]  Review ED Providers recommendations/discharge instructions (if necessary)  Advise per [facility] ED lab result protocol    2. Lab Result (including Rx patient on, if applicable).  If culture, copy of lab report at bottom.  Final Wound Aerobic Bacterial Culture (specimen - L thigh) report on 11/7/23  [facility] Emergency Dept discharge antibiotic prescribed: Cephalexin (Keflex) 500 mg capsule, 1 capsule (500 mg) by mouth 4 times daily for 7 days AND [2] Sulfamethoxazole-Trimethoprim (Bactrim DS, Septra DS) 800-160 mg PO tablet,  1 tablet by mouth 2 times daily for 7 days.  #1. Bacteria, 1+ Staphylococcus aureus,  is [SUSCEPTIBLE] to antibiotic.  Incision and Drainage performed in the [facility] ED: No but scar unroofed  Recommendations in treatment per [facility] ED lab result culture protocol    3. RN Assessment (Patient's current Symptoms):  Time of call: 11/7/2023 11:12 AM  Assessment: patient is taking antibiotics, overall \"feeling pretty good the last couple days\" overall patient is improving, no weakness, or sickness, has not set anything up  Fever: NO  Left Thigh: 'sores are starting to go away', less redness on sores, less swelling/pain, no drainage    4. RN Recommendations/Instructions per [facility] ED lab result protocol  [facility] ED lab result protocol used: Culture protocol  Patient was notified of lab result and treatment recommendations  RN reviewed information about continuing antibiotic, probiotic appropriate, follow up with PCP, declined offer " to assist with scheduling, return for any worsening fevers, redness, warmth, pain, swelling, drainage, or spreading sores.  All questions answered patient verbalized understanding and agrees with plan.      5. Please Contact your PCP clinic or return to the Emergency department if your:  Symptoms return.  Symptoms do not improve after 3 days on antibiotic.  Symptoms do not resolve after completing antibiotic.  Symptoms worsen or other concerning symptoms.      Copy of Lab report (if applicable)  Wound Aerobic Bacterial Culture Routine  Order: [id number]  Status: Final result       Visible to patient: Yes (not seen)    Specimen Information: Thigh, Left; Wound   3 Result Notes  Culture 1+ Staphylococcus aureus Abnormal            Resulting Agency: [facility]     Susceptibility    Staphylococcus aureus (1)    Antibiotic Interpretation Sensitivity  Method Status    Oxacillin Susceptible 0.5 ug/mL NICKO Final     Oxacillin susceptible isolates are susceptible to cephalosporins (example: cefazolin and cephalexin) and beta lactam combination agents. Oxacillin resistant isolates are resistant to these agents.       Gentamicin Susceptible <=0.5 ug/mL NICKO Final    Erythromycin Susceptible <=0.25 ug/mL NICKO Final    Clindamycin Susceptible 0.25 ug/mL NICKO Final    Vancomycin Susceptible 1 ug/mL NICKO Final    Daptomycin Susceptible 1 ug/mL NICKO Final    Tetracycline Susceptible <=1 ug/mL NICKO Final    Doxycycline Susceptible <=0.5 ug/mL NICKO Final    Trimethoprim/Sulfamethoxazole Susceptible <=0.5/9.5 ug/mL NICKO Final          Specimen Collected: 11/04/23  8:27 PM Last Resulted: 11/07/23 12:36 AM               Dmitry Roberts RN  [facility]  Emergency Dept Lab Result RN  Ph# [phone]

## 2023-11-07 NOTE — LETTER
[facility] CARE COORDINATION  [address] [facility] [address]    November 7, 2023    Oral Blount  [address] [facility] [address]      Dear Oral,    I am a clinic community health worker who works with Jonathan Koenig MD with the [facility]. I wanted to thank you for spending the time to talk with me.  Below is a description of clinic care coordination and how we can further assist you.       The clinic care coordination team is made up of a registered nurse, , financial resource worker and community health worker who understand the health care system. The goal of clinic care coordination is to help you manage your health and improve access to the health care system. Our team works alongside your provider to assist you in determining your health and social needs. We can help you obtain health care and community resources, providing you with necessary information and education. We can work with you through any barriers and develop a care plan that helps coordinate and strengthen the communication between you and your care team.  Our services are voluntary and are offered without charge to you personally.    If you wish to connect with the Clinic Care Coordination Team, please let your [facility] Primary Care Provider or Clinic Care Team know and they can place a referral. The Clinic Care Coordination team will then reach out by phone to further support you.    We are focused on providing you with the highest-quality healthcare experience possible.    Sincerely,   Your care team with [facility]

## 2023-11-07 NOTE — PROGRESS NOTES
Clinic Care Coordination Contact  Community Health Worker Initial Outreach    CHW Initial Information Gathering:  Referral Source: ED Follow-Up  Preferred Hospital: [place], [place]  [phone]  Preferred Urgent Care: [facility], [phone]  No PCP office visit in Past Year: No       Patient accepts CC: No, due to the patient stating that at this time there are no concerns or questions for CCC to assist with. Patient will be sent Care Coordination introduction letter for future reference.     JORDY Mansfield  [phone]  Connected Care Resource [facility]

## 2023-12-23 DIAGNOSIS — Z13.220 LIPID SCREENING: Primary | ICD-10-CM

## 2023-12-23 DIAGNOSIS — I10 HYPERTENSION, UNSPECIFIED TYPE: ICD-10-CM

## 2023-12-26 ENCOUNTER — MYC REFILL (OUTPATIENT)
Dept: FAMILY MEDICINE | Facility: CLINIC | Age: 53
End: 2023-12-26
Payer: COMMERCIAL

## 2023-12-26 DIAGNOSIS — I10 HYPERTENSION, UNSPECIFIED TYPE: ICD-10-CM

## 2023-12-26 DIAGNOSIS — B00.1 COLD SORE: ICD-10-CM

## 2023-12-26 RX ORDER — AMLODIPINE BESYLATE 5 MG/1
5 TABLET ORAL DAILY
Qty: 30 TABLET | Refills: 0 | Status: SHIPPED | OUTPATIENT
Start: 2023-12-26 | End: 2024-02-12

## 2023-12-26 NOTE — TELEPHONE ENCOUNTER
Requested Prescriptions   Pending Prescriptions Disp Refills    amLODIPine (NORVASC) 5 MG tablet [Pharmacy Med Name: amLODIPine Besylate 5 MG Oral Tablet] 90 tablet 0     Sig: Take 1 tablet by mouth once daily       Calcium Channel Blockers Protocol  Failed - 12/23/2023  9:18 AM        Failed - Blood pressure under 140/90 in past 12 months     BP Readings from Last 3 Encounters:   11/04/23 (!) 131/90   09/08/23 (!) 146/100   04/25/23 138/87                 Failed - Recent (12 mo) or future (30 days) visit within the authorizing provider's specialty     The patient must have completed an in-person or virtual visit within the past 12 months or has a future visit scheduled within the next 90 days with the authorizing provider s specialty.  Urgent care and e-visits do not quality as an office visit for this protocol.          Passed - Medication is active on med list        Passed - Patient is age 18 or older        Passed - Normal serum creatinine on file in past 12 months     Recent Labs   Lab Test 11/04/23 2033   CR 1.16       Ok to refill medication if creatinine is low

## 2023-12-27 RX ORDER — AMLODIPINE BESYLATE 5 MG/1
5 TABLET ORAL DAILY
Qty: 90 TABLET | Refills: 3 | OUTPATIENT
Start: 2023-12-27

## 2023-12-27 RX ORDER — VALACYCLOVIR HYDROCHLORIDE 1 G/1
2000 TABLET, FILM COATED ORAL 2 TIMES DAILY
Qty: 4 TABLET | Refills: 1 | Status: SHIPPED | OUTPATIENT
Start: 2023-12-27 | End: 2024-02-12

## 2023-12-27 RX ORDER — VALACYCLOVIR HYDROCHLORIDE 1 G/1
TABLET, FILM COATED ORAL
Qty: 4 TABLET | Refills: 0 | OUTPATIENT
Start: 2023-12-27

## 2024-02-06 ENCOUNTER — TRANSFERRED RECORDS (OUTPATIENT)
Dept: HEALTH INFORMATION MANAGEMENT | Facility: CLINIC | Age: 54
End: 2024-02-06
Payer: COMMERCIAL

## 2024-02-06 LAB
ALT SERPL-CCNC: 25 U/L (ref 9–46)
AST SERPL-CCNC: 18 U/L (ref 10–35)
CHOLESTEROL (EXTERNAL): 154 MG/DL
CREATININE (EXTERNAL): 1.21 MG/DL (ref 0.7–1.3)
GFR ESTIMATED (EXTERNAL): 72 ML/MIN/1.73M2
GLUCOSE (EXTERNAL): 91 MG/DL (ref 65–99)
HDLC SERPL-MCNC: 36 MG/DL
LDL CHOLESTEROL CALCULATED (EXTERNAL): 95 MG/DL
NON HDL CHOLESTEROL (EXTERNAL): 118 MG/DL
POTASSIUM (EXTERNAL): 4.5 MMOL/L (ref 3.5–5.3)
TRIGLYCERIDES (EXTERNAL): 125 MG/DL

## 2024-02-12 ENCOUNTER — OFFICE VISIT (OUTPATIENT)
Dept: FAMILY MEDICINE | Facility: CLINIC | Age: 54
End: 2024-02-12
Payer: COMMERCIAL

## 2024-02-12 VITALS
OXYGEN SATURATION: 96 % | HEART RATE: 68 BPM | HEIGHT: 69 IN | BODY MASS INDEX: 31.19 KG/M2 | TEMPERATURE: 97.2 F | SYSTOLIC BLOOD PRESSURE: 150 MMHG | WEIGHT: 210.6 LBS | DIASTOLIC BLOOD PRESSURE: 110 MMHG | RESPIRATION RATE: 16 BRPM

## 2024-02-12 DIAGNOSIS — B00.1 COLD SORE: ICD-10-CM

## 2024-02-12 DIAGNOSIS — I10 UNCONTROLLED HYPERTENSION: Primary | ICD-10-CM

## 2024-02-12 DIAGNOSIS — Z12.11 SCREEN FOR COLON CANCER: ICD-10-CM

## 2024-02-12 PROCEDURE — 99214 OFFICE O/P EST MOD 30 MIN: CPT | Performed by: FAMILY MEDICINE

## 2024-02-12 RX ORDER — AMLODIPINE BESYLATE 5 MG/1
5 TABLET ORAL DAILY
Qty: 90 TABLET | Refills: 3 | Status: SHIPPED | OUTPATIENT
Start: 2024-02-12 | End: 2024-02-12

## 2024-02-12 RX ORDER — AMLODIPINE BESYLATE 5 MG/1
5 TABLET ORAL DAILY
Qty: 30 TABLET | Refills: 0 | Status: SHIPPED | OUTPATIENT
Start: 2024-02-12 | End: 2024-02-12

## 2024-02-12 RX ORDER — AMLODIPINE BESYLATE 5 MG/1
5 TABLET ORAL DAILY
Qty: 90 TABLET | Refills: 3 | Status: SHIPPED | OUTPATIENT
Start: 2024-02-12 | End: 2024-04-03

## 2024-02-12 RX ORDER — VALACYCLOVIR HYDROCHLORIDE 1 G/1
2000 TABLET, FILM COATED ORAL 2 TIMES DAILY
Qty: 4 TABLET | Refills: 1 | Status: SHIPPED | OUTPATIENT
Start: 2024-02-12 | End: 2024-06-17

## 2024-02-12 ASSESSMENT — PAIN SCALES - GENERAL: PAINLEVEL: NO PAIN (0)

## 2024-02-12 NOTE — PATIENT INSTRUCTIONS
Restart amlodipine 5 mg daily and be sure to take it everyday.  Be more consistent with low salt diet. Read the literature in this visit summary.    Be consistent with low salt, low trans fat and low saturated fat diet.  Eat food rich in omega-3-fatty acids as you tolerate. (salmon, olive oil)  Eat 5 cups of vegetables, fruits and whole grains per day.  Limit starchy food (white rice, white bread, white pasta, white potatoes) to less than a cup per meal.  Minimize sweets, junk food and fastfood. Limit soda beverages to one serving per day; best to avoid it altogether though.    Exercise: moderate intensity sustained for at least 30 mins per episode, goal of 150 mins per week at least  Combine cardiovascular and resistance exercises.  These exercise recommendations are in addition to your daily activity at work or home.  Work on losing weight.      Reconsider completing your immunizations to protect yourself from severe illness, and help prevent spread of infectious diseases.  You are due for: updated covid19 and yearly flu shots.  Plan to get hepatitis B immunization series if you have not received it or unsure if you have completed it before.    You may get the Shingrix vaccine for shingles if you desire, and after you verify with insurance how they cover the vaccine.

## 2024-03-06 ENCOUNTER — TELEPHONE (OUTPATIENT)
Dept: FAMILY MEDICINE | Facility: CLINIC | Age: 54
End: 2024-03-06

## 2024-03-06 ENCOUNTER — ALLIED HEALTH/NURSE VISIT (OUTPATIENT)
Dept: FAMILY MEDICINE | Facility: CLINIC | Age: 54
End: 2024-03-06
Payer: COMMERCIAL

## 2024-03-06 VITALS
OXYGEN SATURATION: 98 % | HEART RATE: 75 BPM | DIASTOLIC BLOOD PRESSURE: 98 MMHG | SYSTOLIC BLOOD PRESSURE: 130 MMHG | RESPIRATION RATE: 16 BRPM

## 2024-03-06 DIAGNOSIS — I10 UNCONTROLLED HYPERTENSION: Primary | ICD-10-CM

## 2024-03-06 PROCEDURE — 99207 PR NO CHARGE NURSE ONLY: CPT

## 2024-03-06 NOTE — TELEPHONE ENCOUNTER
Oral Martinez is a 53 year old year old patient who comes in today for a Blood Pressure check because of ongoing blood pressure monitoring.        Vital Signs as repeated by RN   132/98 p75  130/98 p75        Patient is taking medication as prescribed  Patient is tolerating medications well.  Patient is monitoring Blood Pressure at home.    Last checked around 1 mos ago. Runs 130/80's  Current complaints: none  Disposition:  routed to provider for review.     Sonal Aggarwal RN

## 2024-03-06 NOTE — TELEPHONE ENCOUNTER
Called pt & read providers message. Pt verbalized understanding & will increase amlodipine 5mg tab to two tabs per day(10mg).  RN BP recheck scheduled for 4/3/24.    Sonal Aggarwal RN

## 2024-03-06 NOTE — PROGRESS NOTES
Oral Blount is a 53 year old year old patient who comes in today for a Blood Pressure check because of ongoing blood pressure monitoring.      Vital Signs as repeated by RN   132/98 p75  130/98 p75      Patient is taking medication as prescribed  Patient is tolerating medications well.  Patient is monitoring Blood Pressure at home.    Last checked around 1 mos ago. Runs 130/80's  Current complaints: none  Disposition:  routed to provider for review.    Sonal Aggarwal RN

## 2024-03-06 NOTE — TELEPHONE ENCOUNTER
Still with diastolic hypertension  Increase amlodipine 5 mg to two tablets a day.  Reinforce sodium resterictions.  Recheck with RN visit in 3-4 weeks.

## 2024-04-03 ENCOUNTER — TELEPHONE (OUTPATIENT)
Dept: FAMILY MEDICINE | Facility: CLINIC | Age: 54
End: 2024-04-03

## 2024-04-03 ENCOUNTER — ALLIED HEALTH/NURSE VISIT (OUTPATIENT)
Dept: FAMILY MEDICINE | Facility: CLINIC | Age: 54
End: 2024-04-03
Payer: COMMERCIAL

## 2024-04-03 VITALS — SYSTOLIC BLOOD PRESSURE: 134 MMHG | DIASTOLIC BLOOD PRESSURE: 102 MMHG | HEART RATE: 78 BPM

## 2024-04-03 DIAGNOSIS — I10 UNCONTROLLED HYPERTENSION: ICD-10-CM

## 2024-04-03 DIAGNOSIS — I10 HYPERTENSION, UNSPECIFIED TYPE: Primary | ICD-10-CM

## 2024-04-03 PROCEDURE — 99207 PR NO CHARGE NURSE ONLY: CPT

## 2024-04-03 RX ORDER — AMLODIPINE BESYLATE 5 MG/1
10 TABLET ORAL DAILY
Qty: 180 TABLET | Refills: 3 | Status: SHIPPED | OUTPATIENT
Start: 2024-04-03 | End: 2024-05-06 | Stop reason: SINTOL

## 2024-04-03 NOTE — TELEPHONE ENCOUNTER
Increase amlodipine to 10 mg as previously instructed.  Be consistent year round with sodium restrictions.  Recheck BP in 3-4 weeks with a RN visit.

## 2024-04-03 NOTE — PROGRESS NOTES
Oral Blount is a 53 year old patient who comes in today for a Blood Pressure check because of ongoing blood pressure monitoring. Per TE notes from 3/6/24, patient was advised to increase amlodipine from 5 mg to 10 mg; however, this is not reflected on medication list.     Vital Signs as repeated by RN today:  /100 initially, then 134/102 about ten minutes later; Pulse 78.   Patient brought his home wrist for comparison with respective results of 131/96 and 119/88.  It may not be within parameters for accuracy.  He was advised to purchase an arm cuff.   Patient is not taking medication as prescribed.  He states he never increased his amlodipine to 10 mg, as he wanted to try for lifestyle change first.  He has been on vacation eating salty foods, and had some stress as well.  Patient is tolerating medications well.  Patient is monitoring Blood Pressure at home.  Average readings if yes are 120's-130/80's with occasional 140's/90's.  Current complaints: none  Patient continues to try to watch sodium in diet, but admits this is difficult.  He does exercise 4-5 days/week.    Disposition:  Forwarding to PCP to advise please.  Should patient increase amlodipine from 5 mg to 10 mg now, as previously advised?  Please update medication list to reflect recommended dosing.     Thanks,  Evelyn Cano RN  [facility]

## 2024-04-03 NOTE — TELEPHONE ENCOUNTER
Oral Blount is a 53 year old patient who comes in today for a Blood Pressure check because of ongoing blood pressure monitoring. Per TE notes from 3/6/24, patient was advised to increase amlodipine from 5 mg to 10 mg; however, this is not reflected on medication list.     Vital Signs as repeated by RN today:  /100 initially, then 134/102 about ten minutes later; Pulse 78.   Patient brought his home wrist for comparison with respective results of 131/96 and 119/88.  It may not be within parameters for accuracy.  He was advised to purchase an arm cuff.     Patient is not taking medication as prescribed.  He states he never increased his amlodipine to 10 mg, as he wanted to try for lifestyle change first.  He had been on vacation eating salty foods, and had some stress as well.  Patient is tolerating medications well.  Patient is monitoring Blood Pressure at home.  Average readings if yes are 120's-130/80's with occasional 140's/90's.  Current complaints: none  Patient continues to try to watch sodium in diet, but admits this is difficult.  He does exercise 4-5 days/week.    Disposition:  Forwarding to PCP to advise please.  Should patient increase amlodipine from 5 mg to 10 mg now, as previously advised?  Please update medication list to reflect recommended dosing.     Thanks,  Evelyn Cano RN  [facility]

## 2024-05-02 ENCOUNTER — MYC MEDICAL ADVICE (OUTPATIENT)
Dept: FAMILY MEDICINE | Facility: CLINIC | Age: 54
End: 2024-05-02
Payer: COMMERCIAL

## 2024-05-02 DIAGNOSIS — I10 UNCONTROLLED HYPERTENSION: Primary | ICD-10-CM

## 2024-05-03 NOTE — TELEPHONE ENCOUNTER
Dr Koenig,    [facility].  4/3/24 Amlodipine increased to 10 mg & pt now having ankle/leg swelling.    Sonal Aggarwal RN

## 2024-05-06 RX ORDER — LOSARTAN POTASSIUM 25 MG/1
25 TABLET ORAL DAILY
Qty: 90 TABLET | Refills: 0 | Status: SHIPPED | OUTPATIENT
Start: 2024-05-06 | End: 2024-05-28

## 2024-05-06 NOTE — TELEPHONE ENCOUNTER
Sent [facility] message to patient about changing meds.  He will stop amlodipine and start losartan.  Please call patient to schedule an in-clinic visit with me for blood pressure and swelling recheck for end of May 2024.

## 2024-05-28 ENCOUNTER — OFFICE VISIT (OUTPATIENT)
Dept: FAMILY MEDICINE | Facility: CLINIC | Age: 54
End: 2024-05-28
Payer: COMMERCIAL

## 2024-05-28 VITALS
OXYGEN SATURATION: 98 % | TEMPERATURE: 97.9 F | RESPIRATION RATE: 16 BRPM | SYSTOLIC BLOOD PRESSURE: 140 MMHG | HEART RATE: 62 BPM | DIASTOLIC BLOOD PRESSURE: 92 MMHG | WEIGHT: 211 LBS | BODY MASS INDEX: 30.93 KG/M2

## 2024-05-28 DIAGNOSIS — Z91.013 ALLERGY TO SHELLFISH: ICD-10-CM

## 2024-05-28 DIAGNOSIS — Z91.018 ALLERGY TO NUTS (OTHER THAN PEANUTS): ICD-10-CM

## 2024-05-28 DIAGNOSIS — I10 UNCONTROLLED HYPERTENSION: Primary | ICD-10-CM

## 2024-05-28 PROCEDURE — 99214 OFFICE O/P EST MOD 30 MIN: CPT | Performed by: FAMILY MEDICINE

## 2024-05-28 RX ORDER — LOSARTAN POTASSIUM 25 MG/1
50 TABLET ORAL DAILY
Qty: 90 TABLET | Refills: 0 | Status: SHIPPED | OUTPATIENT
Start: 2024-05-28 | End: 2024-06-17

## 2024-05-28 RX ORDER — EPINEPHRINE 0.3 MG/.3ML
0.3 INJECTION SUBCUTANEOUS PRN
Qty: 2 EACH | Refills: 0 | Status: SHIPPED | OUTPATIENT
Start: 2024-05-28

## 2024-05-28 ASSESSMENT — PAIN SCALES - GENERAL: PAINLEVEL: NO PAIN (0)

## 2024-05-28 NOTE — PROGRESS NOTES
Assessment & Plan     Uncontrolled hypertension  Patient was advised BP uncontrolled today.  Reviewed meds with patient.  Increased losartan to optimize management.  Reinforced sodium restriction.  Exercise recommendations reviewed with patient.  Recheck with RN in 1 month    - losartan (COZAAR) 25 MG tablet  Dispense: 90 tablet; Refill: 0    Allergy to shellfish  Asymptomatic.  Patient was advised to have an unexpired epipen supply at all times and with him,  - EPINEPHrine (ANY BX GENERIC EQUIV) 0.3 MG/0.3ML injection 2-pack  Dispense: 2 each; Refill: 0    Allergy to nuts (other than peanuts)  See above  - EPINEPHrine (ANY BX GENERIC EQUIV) 0.3 MG/0.3ML injection 2-pack  Dispense: 2 each; Refill: 0      Patient Instructions   Increase losartan 25 mg to 2 tablets a day.    Be consistent with low salt, low trans fat and low saturated fat diet.  Eat food rich in omega-3-fatty acids as you tolerate. (salmon, olive oil)  Eat 5 cups of vegetables, fruits and whole grains per day.  Limit starchy food (white rice, white bread, white pasta, white potatoes) to less than a cup per meal.  Minimize sweets, junk food and fastfood. Limit soda beverages to one serving per day; best to avoid it altogether though.    Exercise: moderate intensity sustained for at least 30 mins per episode, goal of 150 mins per week at least  Combine cardiovascular and resistance exercises.  These exercise recommendations are in addition to your daily activity at work or home.  Work on losing weight.    Be sure to have an unexpired epipen at all times.    Pacheco Toribio is a 53 year old, presenting for the following health issues:  Hypertension        5/28/2024     9:42 AM   Additional Questions   Roomed by Tiffanie DOWELL CMA   Accompanied by Self         5/28/2024     9:42 AM   Patient Reported Additional Medications   Patient reports taking the following new medications None     History of Present Illness       Hypertension: He presents for follow up  of hypertension.  He does check blood pressure  regularly outside of the clinic. Outside blood pressures have been over 140/90. He follows a low salt diet.     He eats 2-3 servings of fruits and vegetables daily.He consumes 2 sweetened beverage(s) daily.He exercises with enough effort to increase his heart rate 20 to 29 minutes per day.  He exercises with enough effort to increase his heart rate 4 days per week.   He is taking medications regularly.     Patient said leg swelling improved and has resolved since stopping amlodipine.  Tolerating losartan 25 mg daily now.  Patient states he still sees HBP at home.    *Discuss getting an epi-pen. Hx of allergy to shellfish and nuts (not peanuts) - symptom of mouth sores, mouth itching, throat itching and swelling. Last RX for epi pen was several years ago. Patient just never asked for it nor reported it again to his subsequent providers.          Review of Systems  Constitutional, HEENT, cardiovascular, pulmonary, GI, , musculoskeletal, neuro, skin, endocrine and psych systems are negative, except as otherwise noted.      Objective    BP (!) 140/92   Pulse 62   Temp 97.9  F (36.6  C) (Tympanic)   Resp 16   Wt 95.7 kg (211 lb)   SpO2 98%   BMI 30.93 kg/m    Body mass index is 30.93 kg/m .  Physical Exam   GENERAL:  alert and no distress, ambulatory w/o assist  NECK: no tenderness, no adenopathy,  Thyroid not enlarged  RESP: lungs clear to auscultation - no rales, no rhonchi, no wheezes  CV: regular rates and rhythm, no murmur  MS: no edema  SKIN: no suspicious lesions, no rashes  NEURO: no tremors      Signed Electronically by: Jonathan Koenig MD

## 2024-05-28 NOTE — PATIENT INSTRUCTIONS
Increase losartan 25 mg to 2 tablets a day.    Be consistent with low salt, low trans fat and low saturated fat diet.  Eat food rich in omega-3-fatty acids as you tolerate. (salmon, olive oil)  Eat 5 cups of vegetables, fruits and whole grains per day.  Limit starchy food (white rice, white bread, white pasta, white potatoes) to less than a cup per meal.  Minimize sweets, junk food and fastfood. Limit soda beverages to one serving per day; best to avoid it altogether though.    Exercise: moderate intensity sustained for at least 30 mins per episode, goal of 150 mins per week at least  Combine cardiovascular and resistance exercises.  These exercise recommendations are in addition to your daily activity at work or home.  Work on losing weight.    Be sure to have an unexpired epipen at all times.   37w2d

## 2024-06-14 ENCOUNTER — MYC REFILL (OUTPATIENT)
Dept: FAMILY MEDICINE | Facility: CLINIC | Age: 54
End: 2024-06-14
Payer: COMMERCIAL

## 2024-06-14 DIAGNOSIS — B00.1 COLD SORE: ICD-10-CM

## 2024-06-17 ENCOUNTER — TELEPHONE (OUTPATIENT)
Dept: FAMILY MEDICINE | Facility: CLINIC | Age: 54
End: 2024-06-17

## 2024-06-17 ENCOUNTER — ALLIED HEALTH/NURSE VISIT (OUTPATIENT)
Dept: FAMILY MEDICINE | Facility: CLINIC | Age: 54
End: 2024-06-17
Payer: COMMERCIAL

## 2024-06-17 VITALS — SYSTOLIC BLOOD PRESSURE: 142 MMHG | HEART RATE: 70 BPM | DIASTOLIC BLOOD PRESSURE: 104 MMHG

## 2024-06-17 DIAGNOSIS — B00.1 COLD SORE: ICD-10-CM

## 2024-06-17 DIAGNOSIS — I10 UNCONTROLLED HYPERTENSION: Primary | ICD-10-CM

## 2024-06-17 PROCEDURE — 99207 PR NO CHARGE NURSE ONLY: CPT

## 2024-06-17 RX ORDER — LOSARTAN POTASSIUM 100 MG/1
100 TABLET ORAL DAILY
Qty: 90 TABLET | Refills: 3 | Status: SHIPPED | OUTPATIENT
Start: 2024-06-17

## 2024-06-17 RX ORDER — VALACYCLOVIR HYDROCHLORIDE 1 G/1
2000 TABLET, FILM COATED ORAL 2 TIMES DAILY
Qty: 4 TABLET | Refills: 1 | Status: SHIPPED | OUTPATIENT
Start: 2024-06-17

## 2024-06-17 NOTE — TELEPHONE ENCOUNTER
Oral Blount is a 53 year old year old patient who comes in today for a Blood Pressure check because of medication change.  Vital Signs as repeated by /100, 142/104, p 70.  Patient is taking medication as prescribed  Patient is tolerating medications well.  Patient is monitoring Blood Pressure at home.  Average readings if yes are 140/100's. Pulses in the upper 70's.  Current complaints: none  Disposition:  Will route chart to PCP to review readings in separate telephone encounter with high priority due to BP not at goal, patient is advised care team will call him after provider reviews. Patient also reports he has a break out of cold sores this week after returning from [place] last week and requests refill of the Valtrex, refill sent.    JOHN Phelps

## 2024-06-17 NOTE — PROGRESS NOTES
Oral Blount is a 53 year old year old patient who comes in today for a Blood Pressure check because of medication change.  Vital Signs as repeated by /100, 142/104, p 70.  Patient is taking medication as prescribed  Patient is tolerating medications well.  Patient is monitoring Blood Pressure at home.  Average readings if yes are 140/100's. Pulses in the upper 70's.  Current complaints: none  Disposition:  Will route chart to PCP to review readings in separate telephone encounter, patient is advised care team will call him after provider reviews. Patient also reports he has a break out of cold sores this week after returning from [place] last week and requests refill of the Valtrex, refill sent.    JOHN Phelps

## 2024-06-24 ENCOUNTER — ANCILLARY PROCEDURE (OUTPATIENT)
Dept: GENERAL RADIOLOGY | Facility: CLINIC | Age: 54
End: 2024-06-24
Attending: FAMILY MEDICINE
Payer: COMMERCIAL

## 2024-06-24 ENCOUNTER — OFFICE VISIT (OUTPATIENT)
Dept: FAMILY MEDICINE | Facility: CLINIC | Age: 54
End: 2024-06-24
Payer: COMMERCIAL

## 2024-06-24 VITALS
HEIGHT: 69 IN | DIASTOLIC BLOOD PRESSURE: 87 MMHG | SYSTOLIC BLOOD PRESSURE: 124 MMHG | TEMPERATURE: 98.3 F | WEIGHT: 206 LBS | HEART RATE: 76 BPM | RESPIRATION RATE: 18 BRPM | BODY MASS INDEX: 30.51 KG/M2 | OXYGEN SATURATION: 99 %

## 2024-06-24 DIAGNOSIS — J18.9 PNEUMONIA OF LEFT LUNG DUE TO INFECTIOUS ORGANISM, UNSPECIFIED PART OF LUNG: ICD-10-CM

## 2024-06-24 DIAGNOSIS — R06.2 WHEEZING: ICD-10-CM

## 2024-06-24 DIAGNOSIS — R06.2 WHEEZING: Primary | ICD-10-CM

## 2024-06-24 PROCEDURE — 71046 X-RAY EXAM CHEST 2 VIEWS: CPT | Mod: TC | Performed by: RADIOLOGY

## 2024-06-24 PROCEDURE — 99214 OFFICE O/P EST MOD 30 MIN: CPT | Performed by: FAMILY MEDICINE

## 2024-06-24 RX ORDER — DOXYCYCLINE 100 MG/1
100 CAPSULE ORAL 2 TIMES DAILY
Qty: 10 CAPSULE | Refills: 0 | Status: SHIPPED | OUTPATIENT
Start: 2024-06-24 | End: 2024-06-29

## 2024-06-24 RX ORDER — PREDNISONE 20 MG/1
20 TABLET ORAL DAILY
Qty: 5 TABLET | Refills: 0 | Status: SHIPPED | OUTPATIENT
Start: 2024-06-24 | End: 2024-06-29

## 2024-06-24 RX ORDER — ALBUTEROL SULFATE 90 UG/1
2 AEROSOL, METERED RESPIRATORY (INHALATION) EVERY 6 HOURS PRN
Qty: 8.5 G | Refills: 0 | Status: SHIPPED | OUTPATIENT
Start: 2024-06-24

## 2024-06-24 NOTE — PROGRESS NOTES
"  Assessment & Plan   Problem List Items Addressed This Visit    None  Visit Diagnoses       Wheezing    -  Primary    Relevant Medications    predniSONE (DELTASONE) 20 MG tablet    albuterol (PROAIR HFA/PROVENTIL HFA/VENTOLIN HFA) 108 (90 Base) MCG/ACT inhaler    Other Relevant Orders    XR Chest 2 Views    Pneumonia of left lung due to infectious organism, unspecified part of lung        Relevant Medications    doxycycline hyclate (VIBRAMYCIN) 100 MG capsule    albuterol (PROAIR HFA/PROVENTIL HFA/VENTOLIN HFA) 108 (90 Base) MCG/ACT inhaler           Return in 1 week if no improvement    Subjective   Catarino is a 53 year old, presenting for the following health issues:  URI        6/24/2024     2:54 PM   Additional Questions   Roomed by Micaela ROSSI CMA   Accompanied by self     History of Present Illness       Reason for visit:  Feeling sick  Symptom onset:  1-2 weeks ago  Symptoms include:  Fever, cough, aches and tiredness  Symptom intensity:  Moderate  Symptom progression:  Staying the same  Had these symptoms before:  No  What makes it worse:  No  What makes it better:  Rest    He eats 2-3 servings of fruits and vegetables daily.He consumes 2 sweetened beverage(s) daily.He exercises with enough effort to increase his heart rate 20 to 29 minutes per day.  He exercises with enough effort to increase his heart rate 4 days per week.   He is taking medications regularly.         Objective    /87 (BP Location: Right arm, Patient Position: Chair, Cuff Size: Adult Regular)   Pulse 76   Temp 98.3  F (36.8  C) (Temporal)   Resp 18   Ht 1.759 m (5' 9.25\")   Wt 93.4 kg (206 lb)   SpO2 99%   BMI 30.20 kg/m    Body mass index is 30.2 kg/m .  Physical Exam  Constitutional:       General: He is not in acute distress.  HENT:      Right Ear: Tympanic membrane and ear canal normal.      Left Ear: Tympanic membrane and ear canal normal.   Pulmonary:      Effort: No respiratory distress.      Breath sounds: No stridor. " Examination of the right-lower field reveals wheezing and rhonchi. Examination of the left-lower field reveals wheezing and rhonchi. Wheezing and rhonchi present.                Signed Electronically by: KIERAN BEARD DO

## 2024-07-06 ENCOUNTER — MYC MEDICAL ADVICE (OUTPATIENT)
Dept: FAMILY MEDICINE | Facility: CLINIC | Age: 54
End: 2024-07-06
Payer: COMMERCIAL

## 2024-07-06 DIAGNOSIS — I10 UNCONTROLLED HYPERTENSION: Primary | ICD-10-CM

## 2024-07-08 RX ORDER — CHLORTHALIDONE 25 MG/1
25 TABLET ORAL DAILY
Qty: 90 TABLET | Refills: 0 | Status: SHIPPED | OUTPATIENT
Start: 2024-07-08

## 2024-08-19 ENCOUNTER — TELEPHONE (OUTPATIENT)
Dept: FAMILY MEDICINE | Facility: CLINIC | Age: 54
End: 2024-08-19
Payer: COMMERCIAL

## 2024-08-19 NOTE — TELEPHONE ENCOUNTER
Patient Quality Outreach    Patient is due for the following:   Colon Cancer Screening  Physical Preventive Adult Physical    Next Steps:   Schedule a Adult Preventative    Type of outreach:    Sent [facility] message.      Questions for provider review:    None           Lindsay Cardenas MA

## 2024-10-15 DIAGNOSIS — I10 UNCONTROLLED HYPERTENSION: ICD-10-CM

## 2024-10-15 RX ORDER — CHLORTHALIDONE 25 MG/1
25 TABLET ORAL DAILY
Qty: 90 TABLET | Refills: 1 | Status: SHIPPED | OUTPATIENT
Start: 2024-10-15

## 2024-10-29 ENCOUNTER — TELEPHONE (OUTPATIENT)
Dept: FAMILY MEDICINE | Facility: CLINIC | Age: 54
End: 2024-10-29
Payer: COMMERCIAL

## 2024-10-29 NOTE — TELEPHONE ENCOUNTER
Patient calling to schedule an appointment for a skin concern similar to something he experienced last year of a staff infection.    RN informed patient it would be best to schedule an office visit for evaluation and treatment.     RN scheduled patient 10/30/24 with Saranya Lopez office visit for evaluation.     Kirstie Roman RN on 10/29/2024 at 5:23 PM

## 2024-10-30 ENCOUNTER — OFFICE VISIT (OUTPATIENT)
Dept: FAMILY MEDICINE | Facility: CLINIC | Age: 54
End: 2024-10-30
Payer: COMMERCIAL

## 2024-10-30 VITALS
DIASTOLIC BLOOD PRESSURE: 81 MMHG | SYSTOLIC BLOOD PRESSURE: 121 MMHG | TEMPERATURE: 97.7 F | RESPIRATION RATE: 20 BRPM | HEIGHT: 69 IN | HEART RATE: 75 BPM | BODY MASS INDEX: 30.98 KG/M2 | WEIGHT: 209.2 LBS | OXYGEN SATURATION: 96 %

## 2024-10-30 DIAGNOSIS — L08.9 LOCAL INFECTION OF SKIN AND SUBCUTANEOUS TISSUE: Primary | ICD-10-CM

## 2024-10-30 PROCEDURE — 99213 OFFICE O/P EST LOW 20 MIN: CPT | Performed by: NURSE PRACTITIONER

## 2024-10-30 RX ORDER — MUPIROCIN 20 MG/G
OINTMENT TOPICAL 3 TIMES DAILY
Qty: 30 G | Refills: 0 | Status: SHIPPED | OUTPATIENT
Start: 2024-10-30

## 2024-10-30 RX ORDER — SULFAMETHOXAZOLE AND TRIMETHOPRIM 800; 160 MG/1; MG/1
1 TABLET ORAL 2 TIMES DAILY
Qty: 14 TABLET | Refills: 0 | Status: SHIPPED | OUTPATIENT
Start: 2024-10-30 | End: 2024-11-06

## 2024-10-30 ASSESSMENT — PAIN SCALES - GENERAL: PAINLEVEL_OUTOF10: NO PAIN (0)

## 2024-10-30 NOTE — PATIENT INSTRUCTIONS
Try the topical antibiotic 3 times daily for 5 days.  If not improving, start the oral antibiotic.    BACTRIM DISCHARGE INSTRUCTIONS:  You have been prescribed the antibiotic, Bactrim, and will need to take it for the full course as prescribed.      Bactrim will make you sensitive to the sun, so wear sunscreen and avoid prolonged sun exposure while taking this medicine.  Make sure you are drinking enough fluids and water while taking this medicine to prevent risk of kidney stone formation.      Please stop taking this medicine and immediately contact your primary care provider if you develop any of the following symptoms: skin rash, yellowing of the skin, diarrhea, mouth sores, or abdominal pain.

## 2024-10-30 NOTE — PROGRESS NOTES
"  Assessment & Plan     Local infection of skin and subcutaneous tissue  Developed a few lesions to his left forearm over the past few days.  Has been treated for cellulitis in the past, and was concerned that that is what this was.  There are about 4 scattered lesions, 1 pustular, to the left forearm.  He has not had fevers or systemic symptoms.  Given the limited area involved, I think it is reasonable to treat with topical antibiotics.  Discussed that if this is not improving over the course of 3 to 5 days, he can start oral antibiotics.  He is provided a printed prescription for Bactrim to be filled if not improving with a topical antibiotic.  He will let me know if not improving.  - mupirocin (BACTROBAN) 2 % external ointment; Apply topically 3 times daily.  - sulfamethoxazole-trimethoprim (BACTRIM DS) 800-160 MG tablet; Take 1 tablet by mouth 2 times daily for 7 days.          BMI  Estimated body mass index is 30.67 kg/m  as calculated from the following:    Height as of this encounter: 1.759 m (5' 9.25\").    Weight as of this encounter: 94.9 kg (209 lb 3.2 oz).         See Patient Instructions    Subjective   Catarino is a 53 year old, presenting for the following health issues:  Derm Problem (Sores on left arm. Had similar spots last year that was a staph infection)      10/30/2024     2:45 PM   Additional Questions   Roomed by FLORIAN Reaves CMA     History of Present Illness       Reason for visit:  Skin lesion  Symptom onset:  1-2 weeks ago  Symptom intensity:  Mild  Symptom progression:  Staying the same  Had these symptoms before:  Yes  Has tried/received treatment for these symptoms:  Yes  Previous treatment was successful:  Yes He is missing 1 dose(s) of medications per week.           Review of Systems  Constitutional, neuro, ENT, endocrine, pulmonary, cardiac, gastrointestinal, genitourinary, musculoskeletal, integument and psychiatric systems are negative, except as otherwise noted.      Objective    BP " "121/81   Pulse 75   Temp 97.7  F (36.5  C) (Oral)   Resp 20   Ht 1.759 m (5' 9.25\")   Wt 94.9 kg (209 lb 3.2 oz)   SpO2 96%   BMI 30.67 kg/m    Body mass index is 30.67 kg/m .  Physical Exam  Vitals and nursing note reviewed.   Constitutional:       Appearance: Normal appearance.   HENT:      Head: Normocephalic and atraumatic.      Mouth/Throat:      Mouth: Mucous membranes are moist.   Cardiovascular:      Rate and Rhythm: Normal rate.   Pulmonary:      Effort: Pulmonary effort is normal.   Musculoskeletal:      Cervical back: Neck supple.   Skin:     General: Skin is warm and dry.      Comments: The left forearm, there are 4 scabbed round lesions with minimal surrounding erythema.  1 lesion of the appears to be pustular.  No crusting, no discharge, no fluctuance, no induration.   Neurological:      General: No focal deficit present.      Mental Status: He is alert.   Psychiatric:         Mood and Affect: Mood normal.         Behavior: Behavior normal.                    Signed Electronically by: WOOD Heard CNP    "

## 2024-11-02 ENCOUNTER — HEALTH MAINTENANCE LETTER (OUTPATIENT)
Age: 54
End: 2024-11-02

## 2025-05-06 DIAGNOSIS — I10 UNCONTROLLED HYPERTENSION: ICD-10-CM

## 2025-05-06 DIAGNOSIS — I10 BENIGN ESSENTIAL HYPERTENSION: Primary | ICD-10-CM

## 2025-05-06 RX ORDER — CHLORTHALIDONE 25 MG/1
25 TABLET ORAL DAILY
Qty: 90 TABLET | Refills: 0 | Status: SHIPPED | OUTPATIENT
Start: 2025-05-06

## 2025-05-06 NOTE — TELEPHONE ENCOUNTER
Requested Prescriptions   Pending Prescriptions Disp Refills    chlorthalidone (HYGROTON) 25 MG tablet [Pharmacy Med Name: Chlorthalidone 25 MG Oral Tablet] 90 tablet 0     Sig: Take 1 tablet by mouth once daily       Diuretics (Including Combos) Protocol Failed - 5/6/2025 10:22 AM        Failed - Potassium level on file in past 12 months        Failed - Has GFR on file in past 12 months and most recent value is normal   GFR Estimate   Date Value Ref Range Status   11/04/2023 76 >60 mL/min/1.73m2 Final   04/19/2021 75 >60 mL/min/[1.73_m2] Final     Comment:     Non  GFR Calc  Starting 12/18/2018, serum creatinine based estimated GFR (eGFR) will be   calculated using the Chronic Kidney Disease Epidemiology Collaboration   (CKD-EPI) equation.            Passed - Most recent blood pressure under 140/90 in past 12 months     BP Readings from Last 3 Encounters:   02/07/25 120/86   10/30/24 121/81   06/24/24 124/87       No data recorded            Passed - Medication is active on med list and the sig matches. RN to manually verify dose and sig if red X/fail.     If the protocol passes (green check), you do not need to verify med dose and sig.    A prescription matches if they are the same clinical intention.    For Example: once daily and every morning are the same.    The protocol can not identify upper and lower case letters as matching and will fail.     For Example: Take 1 tablet (50 mg) by mouth daily     TAKE 1 TABLET (50 MG) BY MOUTH DAILY    For all fails (red x), verify dose and sig.    If the refill does match what is on file, the RN can still proceed to approve the refill request.       If they do not match, route to the appropriate provider.             Passed - Medication indicated for associated diagnosis     Medication is associated with one or more of the following diagnoses:     Edema   Hypertension   Heart Failure   Meniere's Disease   Bilateral localized swelling of lower  limbs   Pulmonary Hypertension          Passed - Recent (12 mo) or future (90 days) visit within the authorizing provider's specialty     The patient must have completed an in-person or virtual visit within the past 12 months or has a future visit scheduled within the next 90 days with the authorizing provider s specialty.  Urgent care and e-visits do not qualify as an office visit for this protocol.          Passed - Patient is age 18 or older

## 2025-09-02 DIAGNOSIS — Z12.5 SCREENING FOR PROSTATE CANCER: ICD-10-CM

## 2025-09-02 DIAGNOSIS — Z13.220 LIPID SCREENING: Primary | ICD-10-CM

## 2025-09-02 DIAGNOSIS — I10 UNCONTROLLED HYPERTENSION: ICD-10-CM

## 2025-09-03 RX ORDER — LOSARTAN POTASSIUM 100 MG/1
100 TABLET ORAL DAILY
Qty: 30 TABLET | Refills: 0 | Status: SHIPPED | OUTPATIENT
Start: 2025-09-03

## (undated) DEVICE — ENDO FORCEP ENDOJAW BIOPSY 2.8MMX230CM FB-220U